# Patient Record
Sex: MALE | Race: BLACK OR AFRICAN AMERICAN | Employment: UNEMPLOYED | URBAN - METROPOLITAN AREA
[De-identification: names, ages, dates, MRNs, and addresses within clinical notes are randomized per-mention and may not be internally consistent; named-entity substitution may affect disease eponyms.]

---

## 2017-01-03 ENCOUNTER — TELEPHONE (OUTPATIENT)
Dept: FAMILY MEDICINE | Facility: CLINIC | Age: 72
End: 2017-01-03

## 2017-01-03 NOTE — TELEPHONE ENCOUNTER
Reason for Call: Request for an order or referral:    Order or referral being requested: Skilled nursing twice a month for 2 month, once a month for one month and 2 PRN    Date needed: as soon as possible    Has the patient been seen by the PCP for this problem? YES    Additional comments: Management for his diabetes    Phone number Patient can be reached at:  Other phone number:  [phone]    Best Time:  Any    Can we leave a detailed message on this number?  YES    Call taken on 1/3/2017 at 3:37 PM by YANETH LAY

## 2017-01-03 NOTE — TELEPHONE ENCOUNTER
OK given to [facility] for continuation of care with skilled nursing twice for 2 months, and once a month for one month and 2 prn.  OK given.  Latoya fax over order for signature

## 2017-01-05 DIAGNOSIS — E11.21 TYPE 2 DIABETES MELLITUS WITH DIABETIC NEPHROPATHY, WITHOUT LONG-TERM CURRENT USE OF INSULIN (H): Primary | Chronic | ICD-10-CM

## 2017-01-05 NOTE — TELEPHONE ENCOUNTER
sitagliptin (JANUVIA) 50 MG tablet         Last Written Prescription Date: 5/17/16  Last Fill Quantity: 90, # refills: 1  Last Office Visit with [facility], [facility] or [facility] prescribing provider:  10/31/16        BP Readings from Last 3 Encounters:   10/31/16 108/54   04/15/16 124/62   04/07/16 124/64     MICROL       19   10/31/2016  No results found for this basename: microalbumin  CREATININE   Date Value Ref Range Status   10/31/2016 1.04 0.66 - 1.25 mg/dL Final   ]  GFR ESTIMATE   Date Value Ref Range Status   10/31/2016 70 >60 mL/min/1.7m2 Final   03/11/2016 73 >60 mL/min/1.7m2 Final   10/13/2015 66 >60 mL/min/1.7m2 Final     GFR ESTIMATE IF BLACK   Date Value Ref Range Status   10/31/2016 85 >60 mL/min/1.7m2 Final   03/11/2016 89 >60 mL/min/1.7m2 Final   10/13/2015 80 >60 mL/min/1.7m2 Final     CHOL      161   10/31/2016  HDL       47   10/31/2016  LDL       85   10/31/2016  LDL       95   9/20/2012  TRIG      146   10/31/2016  CHOLHDLRATIO      3.1   3/11/2015  AST       25   11/2/2012  ALT       28   10/31/2016  A1C      6.7   10/31/2016  A1C      9.7   3/11/2016  A1C      7.1   10/13/2015  A1C      7.9   3/11/2015  A1C      6.3   10/3/2014  POTASSIUM   Date Value Ref Range Status   10/31/2016 4.4 3.4 - 5.3 mmol/L Final

## 2017-01-10 DIAGNOSIS — E11.21 TYPE 2 DIABETES MELLITUS WITH DIABETIC NEPHROPATHY (H): Primary | Chronic | ICD-10-CM

## 2017-01-10 NOTE — TELEPHONE ENCOUNTER
Contour strips      Last Written Prescription Date: 3/11/16  Last Fill Quantity: 100,  # refills: 4   Last Office Visit with [facility], [facility] or [facility] prescribing provider: 10/31/16      microlet lancets       Last Written Prescription Date:    Last Fill Quantity:  ,  # refills:     Last Office Visit with [facility], [facility] or [facility] prescribing provider: 10/31/16

## 2017-01-13 DIAGNOSIS — E55.9 VITAMIN D INSUFFICIENCY: Primary | ICD-10-CM

## 2017-01-13 DIAGNOSIS — E53.8 VITAMIN B 12 DEFICIENCY: ICD-10-CM

## 2017-01-13 NOTE — TELEPHONE ENCOUNTER
Vit d 50,000       Last Written Prescription Date: 3/28/16  Last Fill Quantity: 8,  # refills: 11   Last Office Visit with [facility], [facility] or [facility] prescribing provider: 10/31/16    Vit b-12 1000 mcg       Last Written Prescription Date: 3/28/16  Last Fill Quantity: 90,  # refills: 2   Last Office Visit with [facility], [facility] or [facility] prescribing provider: 10/31/16

## 2017-01-17 DIAGNOSIS — Z53.9 DIAGNOSIS NOT YET DEFINED: Primary | ICD-10-CM

## 2017-01-17 PROCEDURE — G0179 MD RECERTIFICATION HHA PT: HCPCS | Performed by: FAMILY MEDICINE

## 2017-01-17 NOTE — TELEPHONE ENCOUNTER
Prescription approved per [facility] Refill Protocol.  Vitamin B-12 Rx approved.   Will route to PCP refill for Vitamin D -50,000. Please advise if PCP would like to keep pt on same dose or order lab.  Last vitamin D  Check -10/31/2016 see below.   Vitamin D Deficiency screening 30 20 - 75 ug/L

## 2017-01-19 ENCOUNTER — TELEPHONE (OUTPATIENT)
Dept: FAMILY MEDICINE | Facility: CLINIC | Age: 72
End: 2017-01-19

## 2017-01-27 DIAGNOSIS — M17.0 PRIMARY OSTEOARTHRITIS OF BOTH KNEES: Primary | ICD-10-CM

## 2017-01-27 NOTE — TELEPHONE ENCOUNTER
mapap 500 mg       Last Written Prescription Date:     Last Fill Quantity:  ,   # refills:    Last Office Visit with [facility], [facility] or [facility] prescribing provider: 10/31/16  Future Office visit:       Routing refill request to provider for review/approval because:  Drug not on patient's medication list

## 2017-02-24 DIAGNOSIS — E55.9 VITAMIN D INSUFFICIENCY: ICD-10-CM

## 2017-02-28 ENCOUNTER — CARE COORDINATION (OUTPATIENT)
Dept: GERIATRIC MEDICINE | Facility: CLINIC | Age: 72
End: 2017-02-28

## 2017-02-28 NOTE — PROGRESS NOTES
[facility] Six-Month Telephone Assessment  6 month telephone assessment completed on 03/01/17.  ER visits: No  Hospitalizations: No  TCU stays: No  Significant health status changes: None.  Falls/Injuries: No  ADL/IADL changes: No  Changes in services: No  Caregiver Assessment follow up:  N/A  Goals: See POC in chart for goal progress documentation.   Will see client in 6 months for an annual health risk assessment.   Encouraged client to call CM with any questions or concerns in the meantime.   Digna Chen RN PHN  [facility]   Phone:   [phone]  Fax:       [phone]

## 2017-03-06 ENCOUNTER — TELEPHONE (OUTPATIENT)
Dept: FAMILY MEDICINE | Facility: CLINIC | Age: 72
End: 2017-03-06

## 2017-03-06 NOTE — TELEPHONE ENCOUNTER
RN called requesting recertification ordered for skilled nursing 2x/month for 2 months and 2 prns.   Verbal Okay given. FYI sent to PCP.

## 2017-03-10 ENCOUNTER — OFFICE VISIT (OUTPATIENT)
Dept: FAMILY MEDICINE | Facility: CLINIC | Age: 72
End: 2017-03-10
Payer: COMMERCIAL

## 2017-03-10 VITALS
HEART RATE: 73 BPM | OXYGEN SATURATION: 99 % | SYSTOLIC BLOOD PRESSURE: 114 MMHG | HEIGHT: 70 IN | WEIGHT: 245 LBS | TEMPERATURE: 97.2 F | BODY MASS INDEX: 35.07 KG/M2 | DIASTOLIC BLOOD PRESSURE: 60 MMHG | RESPIRATION RATE: 16 BRPM

## 2017-03-10 DIAGNOSIS — J20.9 ACUTE BRONCHITIS WITH SYMPTOMS > 10 DAYS: ICD-10-CM

## 2017-03-10 DIAGNOSIS — J98.01 ACUTE BRONCHOSPASM: Primary | ICD-10-CM

## 2017-03-10 DIAGNOSIS — K59.00 CONSTIPATION, UNSPECIFIED CONSTIPATION TYPE: ICD-10-CM

## 2017-03-10 PROCEDURE — 94640 AIRWAY INHALATION TREATMENT: CPT | Performed by: PHYSICIAN ASSISTANT

## 2017-03-10 PROCEDURE — 99214 OFFICE O/P EST MOD 30 MIN: CPT | Mod: 25 | Performed by: PHYSICIAN ASSISTANT

## 2017-03-10 RX ORDER — CODEINE PHOSPHATE AND GUAIFENESIN 10; 100 MG/5ML; MG/5ML
1 SOLUTION ORAL EVERY 4 HOURS PRN
Qty: 120 ML | Refills: 0 | Status: SHIPPED | OUTPATIENT
Start: 2017-03-10 | End: 2017-05-08

## 2017-03-10 RX ORDER — POLYETHYLENE GLYCOL 3350 17 G/17G
1 POWDER, FOR SOLUTION ORAL DAILY
Qty: 510 G | Refills: 0 | Status: SHIPPED | OUTPATIENT
Start: 2017-03-10 | End: 2017-05-08

## 2017-03-10 RX ORDER — ALBUTEROL SULFATE 90 UG/1
2 AEROSOL, METERED RESPIRATORY (INHALATION) EVERY 6 HOURS PRN
Qty: 1 INHALER | Refills: 0 | Status: SHIPPED | OUTPATIENT
Start: 2017-03-10 | End: 2017-05-08

## 2017-03-10 RX ORDER — AZITHROMYCIN 250 MG/1
TABLET, FILM COATED ORAL
Qty: 6 TABLET | Refills: 0 | Status: SHIPPED | OUTPATIENT
Start: 2017-03-10 | End: 2017-05-08

## 2017-03-10 NOTE — PROGRESS NOTES
SUBJECTIVE:                                                    Suze Devlin is a 72 year old male who presents to clinic today for the following health issues:      RESPIRATORY SYMPTOMS      Duration: 2 weeks    Description  nasal congestion, cough    Severity: moderate    Accompanying signs and symptoms: None    History (predisposing factors):  none    Precipitating or alleviating factors: None    Therapies tried and outcome:  none   Denies fever/chills, HA, CP, abd pain, N/V/D, rash, or any other symptoms. Patient denies history of DVT/PE, recent travel/surgery, tobacco use, leg pain or swelling, or history of cancer.    ## He is also complaining of constipation. He has been using senna, which he states is not improving or helping his constipation.     Problem list and histories reviewed & adjusted, as indicated.  Additional history: as documented    Patient Active Problem List   Diagnosis     Health Care Home     Prostatitis     Disease of lung     Cataract     Elevated prostate specific antigen (PSA)     B-complex deficiency     Insomnia     Vitamin D deficiency     Hypertrophy of prostate without urinary obstruction     Memory loss     Anxiety state     Esophageal reflux     Primary localized osteoarthrosis, lower leg     Vitamin D insufficiency     Constipation     Vitamin B 12 deficiency     Hypertension goal BP (blood pressure) < 140/80     Chronic fatigue     Hyperlipidemia LDL goal <100     GERD (gastroesophageal reflux disease)     Asthma, mild intermittent     Diabetic neuropathy (H)     Lumbago     ACP (advance care planning)     Type 2 diabetes mellitus with diabetic nephropathy (H)     Past Surgical History   Procedure Laterality Date     Hernia repair, inguinal rt/lt         Social History   Substance Use Topics     Smoking status: Never Smoker     Smokeless tobacco: Never Used     Alcohol use No     Family History   Problem Relation Age of Onset     Unknown/Adopted Mother      medical  history of family is unknown.         Current Outpatient Prescriptions   Medication Sig Dispense Refill     albuterol (PROAIR HFA/PROVENTIL HFA/VENTOLIN HFA) 108 (90 BASE) MCG/ACT Inhaler Inhale 2 puffs into the lungs every 6 hours as needed for shortness of breath / dyspnea or wheezing 1 Inhaler 0     azithromycin (ZITHROMAX) 250 MG tablet Two tablets first day, then one tablet daily for four days. 6 tablet 0     guaiFENesin-codeine (ROBITUSSIN AC) 100-10 MG/5ML SOLN solution Take 5 mLs by mouth every 4 hours as needed for cough 120 mL 0     polyethylene glycol (MIRALAX) powder Take 17 g (1 capful) by mouth daily 510 g 0     cholecalciferol (VITAMIN D3) 74692 UNITS capsule Take 1 capsule (50,000 Units) by mouth once a week 4 capsule 11     acetaminophen 500 MG CAPS Take 1 tablet by mouth 4 times daily 120 capsule 11     Cyanocobalamin (B-12) 1000 MCG TBCR Take 1,000 mcg by mouth daily 90 tablet 2     blood glucose monitoring (NO BRAND SPECIFIED) test strip Use to test blood sugars 4 times daily or as directed    Contour 400 strip 1     order for DME Lancets.  Four times daily and prn. Fill per patient's insurance.      Microlet lancets 400 each 1     sitagliptin (JANUVIA) 50 MG tablet Take 1 tablet (50 mg) by mouth daily 90 tablet 1     pioglitazone (ACTOS) 15 MG tablet Take 1 tablet (15 mg) by mouth daily 90 tablet 1     glipiZIDE (GLUCOTROL XL) 10 MG 24 hr tablet Take 1 tablet (10 mg) by mouth daily 90 tablet 1     montelukast (SINGULAIR) 10 MG tablet Take 1 tablet (10 mg) by mouth At Bedtime 90 tablet 3     omeprazole (PRILOSEC) 40 MG capsule Take 1 capsule (40 mg) by mouth daily Take 30-60 minutes before a meal. 91 capsule 2     finasteride (PROSCAR) 5 MG tablet Take 1 tablet (5 mg) by mouth daily 90 tablet 3     atorvastatin (LIPITOR) 20 MG tablet Take 1 tablet (20 mg) by mouth daily 90 tablet 3     tamsulosin (FLOMAX) 0.4 MG 24 hr capsule Take 1 capsule (0.4 mg) by mouth daily 90 capsule 3     order for DME  Equipment being ordered: Nebulizer  ONE  USE Q 4 HOURS PRN EXACERBATION OF ASTHMA ML  ALBUTEROL 2.5MG PER 3ML   (J45.20) Mild intermittent asthma without complication  (primary encounter diagnosis)    MAKI MORA JR., MD 1 each 1     metFORMIN modified (GLUMETZA) 1000 MG 24 hr tablet Take 1 tablet (1,000 mg) by mouth daily (with dinner) 30 tablet 11     senna-docusate (SENOKOT-S;PERICOLACE) 8.6-50 MG per tablet Take 1 tablet by mouth 2 times daily 120 tablet 11     albuterol (PROAIR HFA, PROVENTIL HFA, VENTOLIN HFA) 108 (90 BASE) MCG/ACT inhaler Inhale 2 puffs into the lungs 4 times daily as needed 1 Inhaler 11     ipratropium - albuterol 0.5 mg/2.5 mg/3 mL (DUONEB) 0.5-2.5 (3) MG/3ML nebulization Take 1 vial (3 mLs) by nebulization every 6 hours as needed for shortness of breath / dyspnea or wheezing 2.5 mL o     losartan (COZAAR) 25 MG tablet Take 1 tablet (25 mg) by mouth daily 90 tablet 3     aspirin 81 MG EC tablet Take 1 tablet (81 mg) by mouth daily 90 tablet 3     blood glucose monitoring (NO BRAND SPECIFIED) meter device kit Use to test blood sugar two times daily or as directed. 1 kit 0     blood glucose monitoring (NO BRAND SPECIFIED) test strip Use to test blood sugars two times daily or as directed 100 each 4     diclofenac (VOLTAREN) 1 % GEL Apply 4 grams to knees or 2 grams to hands four times daily using enclosed dosing card. 100 g 1     traMADol (ULTRAM) 50 MG tablet Take 1 tablet (50 mg) by mouth 2 times daily as needed for moderate pain 240 tablet 0     blood glucose monitoring (ONE TOUCH DELICA) lancets Use to test blood sugars  TWICE DAILY    or as directed. 3 Box prn     lidocaine (XYLOCAINE) 5 % ointment One application 4 x daily to affected areas lower back and knees if necessary 300 g 0     tadalafil (CIALIS) 5 MG tablet Take 1 tablet (5 mg) by mouth daily Never use with nitroglycerin, terazosin or doxazosin. 90 tablet 0     acetaminophen (TYLENOL) 650 MG CR tablet Take 1 tablet (650  "mg) by mouth 4 times daily 240 tablet 0     triamcinolone (KENALOG) 0.1 % paste Take by mouth 2 times daily 5 g 11     blood glucose test strip 1 strip by In Vitro route daily 100 strip 3     No Known Allergies    Reviewed and updated as needed this visit by clinical staff  Tobacco       Reviewed and updated as needed this visit by Provider         ROS:  Constitutional, HEENT, cardiovascular, pulmonary, GI, , musculoskeletal, neuro, skin, endocrine and psych systems are negative, except as otherwise noted.    OBJECTIVE:                                                    /60  Pulse 73  Temp 97.2  F (36.2  C) (Tympanic)  Resp 16  Ht 5' 10\" (1.778 m)  Wt 245 lb (111.1 kg)  SpO2 99%  BMI 35.15 kg/m2  Body mass index is 35.15 kg/(m^2).  GENERAL: healthy, alert and no distress  EYES: Eyes grossly normal to inspection, PERRL and conjunctivae and sclerae normal  HENT: ear canals and TM's normal, nose and mouth without ulcers or lesions  NECK: no adenopathy, no asymmetry, masses, or scars and thyroid normal to palpation  RESP: expiratory wheezes R upper anterior, R upper posterior, L upper anterior and L upper posterior  CV: regular rate and rhythm, normal S1 S2, no S3 or S4, no murmur, click or rub, no peripheral edema and peripheral pulses strong  ABDOMEN: soft, nontender, no hepatosplenomegaly, no masses and bowel sounds normal  MS: no gross musculoskeletal defects noted, no edema    Diagnostic Test Results:  none    DUO NEB given in office and lungs CTA after treatment.   ASSESSMENT/PLAN:                                                    1. Acute bronchospasm  - ALBUTEROL/IPRATROPIUM 3ML NEB  - INHALATION/NEBULIZER TREATMENT, INITIAL  - albuterol (PROAIR HFA/PROVENTIL HFA/VENTOLIN HFA) 108 (90 BASE) MCG/ACT Inhaler; Inhale 2 puffs into the lungs every 6 hours as needed for shortness of breath / dyspnea or wheezing  Dispense: 1 Inhaler; Refill: 0    2. Acute bronchitis with symptoms > 10 days  Lungs are CTA " after 1 DUONEB. Discussion with patient about blood sugars, which he states are controlled. Oral steroid can raise BS. Patient declines medication at this time. If symptoms persist with wheezing for another 10 + days I advise that he RTC and a course of oral steroids be initiated.   - albuterol (PROAIR HFA/PROVENTIL HFA/VENTOLIN HFA) 108 (90 BASE) MCG/ACT Inhaler; Inhale 2 puffs into the lungs every 6 hours as needed for shortness of breath / dyspnea or wheezing  Dispense: 1 Inhaler; Refill: 0  - azithromycin (ZITHROMAX) 250 MG tablet; Two tablets first day, then one tablet daily for four days.  Dispense: 6 tablet; Refill: 0  - guaiFENesin-codeine (ROBITUSSIN AC) 100-10 MG/5ML SOLN solution; Take 5 mLs by mouth every 4 hours as needed for cough  Dispense: 120 mL; Refill: 0    3. Constipation, unspecified constipation type  Diet and adequate hydration.   - polyethylene glycol (MIRALAX) powder; Take 17 g (1 capful) by mouth daily  Dispense: 510 g; Refill: 0  I have discussed any lab or imaging results, the patient's diagnosis, and my plan of treatment with the patient and/or family. Patient is aware to come back in with worsening symptoms or if no relief despite treatment plan.  Patient voiced understanding and had no further questions.     Nicki Schumacher PA-C  [facility]

## 2017-03-10 NOTE — MR AVS SNAPSHOT
"              After Visit Summary   3/10/2017    Suze Devlin    MRN: [id number]           Patient Information     Date Of Birth          1945        Visit Information        Provider Department      3/10/2017 10:20 AM Nicki Schumacher PA-C; [employer] [facility]        Today's Diagnoses     Acute bronchospasm    -  1    Acute bronchitis with symptoms > 10 days        Constipation, unspecified constipation type           Follow-ups after your visit        Who to contact     If you have questions or need follow up information about today's clinic visit or your schedule please contact [facility] directly at [phone].  Normal or non-critical lab and imaging results will be communicated to you by MyChart, letter or phone within 4 business days after the clinic has received the results. If you do not hear from us within 7 days, please contact the clinic through MyChart or phone. If you have a critical or abnormal lab result, we will notify you by phone as soon as possible.  Submit refill requests through [employer] or call your pharmacy and they will forward the refill request to us. Please allow 3 business days for your refill to be completed.          Additional Information About Your Visit        MyChart Information     [employer] lets you send messages to your doctor, view your test results, renew your prescriptions, schedule appointments and more. To sign up, go to [email or web address] . Click on \"Log in\" on the left side of the screen, which will take you to the Welcome page. Then click on \"Sign up Now\" on the right side of the page.     You will be asked to enter the access code listed below, as well as some personal information. Please follow the directions to create your username and password.     Your access code is: [email or web address]  Expires: 2017 11:10 AM     Your access code will  in 90 days. If " "you need help or a new code, please call your [place] clinic or [phone].        Care EveryWhere ID     This is your Care EveryWhere ID. This could be used by other organizations to access your [place] medical records  [id number]        Your Vitals Were     Pulse Temperature Respirations Height Pulse Oximetry BMI (Body Mass Index)    73 97.2  F (36.2  C) (Tympanic) 16 5' 10\" (1.778 m) 99% 35.15 kg/m2       Blood Pressure from Last 3 Encounters:   03/10/17 114/60   10/31/16 108/54   04/15/16 124/62    Weight from Last 3 Encounters:   03/10/17 245 lb (111.1 kg)   10/31/16 237 lb 6.4 oz (107.7 kg)   04/15/16 221 lb 3.2 oz (100.3 kg)              We Performed the Following     ALBUTEROL/IPRATROPIUM 3ML NEB     INHALATION/NEBULIZER TREATMENT, INITIAL          Today's Medication Changes          These changes are accurate as of: 3/10/17 11:10 AM.  If you have any questions, ask your nurse or doctor.               Start taking these medicines.        Dose/Directions    azithromycin 250 MG tablet   Commonly known as:  ZITHROMAX   Used for:  Acute bronchitis with symptoms > 10 days   Started by:  Nicki Schumacher PA-C        Two tablets first day, then one tablet daily for four days.   Quantity:  6 tablet   Refills:  0       guaiFENesin-codeine 100-10 MG/5ML Soln solution   Commonly known as:  ROBITUSSIN AC   Used for:  Acute bronchitis with symptoms > 10 days   Started by:  Nicki Schumacher PA-C        Dose:  1 tsp.   Take 5 mLs by mouth every 4 hours as needed for cough   Quantity:  120 mL   Refills:  0       polyethylene glycol powder   Commonly known as:  MIRALAX   Used for:  Constipation, unspecified constipation type   Started by:  Nicki Schumacher PA-C        Dose:  1 capful   Take 17 g (1 capful) by mouth daily   Quantity:  510 g   Refills:  0         These medicines have changed or have updated prescriptions.        Dose/Directions    * albuterol 108 (90 BASE) MCG/ACT Inhaler   Commonly " known as:  PROAIR HFA/PROVENTIL HFA/VENTOLIN HFA   This may have changed:  Another medication with the same name was added. Make sure you understand how and when to take each.   Used for:  Wheezing, Mild intermittent asthma with acute exacerbation   Changed by:  Zack Hudson MD        Dose:  2 puff   Inhale 2 puffs into the lungs 4 times daily as needed   Quantity:  1 Inhaler   Refills:  11       * albuterol 108 (90 BASE) MCG/ACT Inhaler   Commonly known as:  PROAIR HFA/PROVENTIL HFA/VENTOLIN HFA   This may have changed:  You were already taking a medication with the same name, and this prescription was added. Make sure you understand how and when to take each.   Used for:  Acute bronchospasm, Acute bronchitis with symptoms > 10 days   Changed by:  Nicki Schumacher PA-C        Dose:  2 puff   Inhale 2 puffs into the lungs every 6 hours as needed for shortness of breath / dyspnea or wheezing   Quantity:  1 Inhaler   Refills:  0       * Notice:  This list has 2 medication(s) that are the same as other medications prescribed for you. Read the directions carefully, and ask your doctor or other care provider to review them with you.         Where to get your medicines      These medications were sent to [facility] [facility], [address] [address]     Phone:  [phone]     albuterol 108 (90 BASE) MCG/ACT Inhaler    azithromycin 250 MG tablet    polyethylene glycol powder         Some of these will need a paper prescription and others can be bought over the counter.  Ask your nurse if you have questions.     Bring a paper prescription for each of these medications     guaiFENesin-codeine 100-10 MG/5ML Soln solution                Primary Care Provider Office Phone # Fax #    Zack Hudson [phone]       [facility] RASHID HAAS [address] [facility] [address]        Thank you!     Thank you for  choosing [facility]  for your care. Our goal is always to provide you with excellent care. Hearing back from our patients is one way we can continue to improve our services. Please take a few minutes to complete the written survey that you may receive in the mail after your visit with us. Thank you!             Your Updated Medication List - Protect others around you: Learn how to safely use, store and throw away your medicines at www.disposemymeds.org.          This list is accurate as of: 3/10/17 11:10 AM.  Always use your most recent med list.                   Brand Name Dispense Instructions for use    * acetaminophen 650 MG CR tablet    TYLENOL    240 tablet    Take 1 tablet (650 mg) by mouth 4 times daily       * acetaminophen 500 MG Caps     120 capsule    Take 1 tablet by mouth 4 times daily       * albuterol 108 (90 BASE) MCG/ACT Inhaler    PROAIR HFA/PROVENTIL HFA/VENTOLIN HFA    1 Inhaler    Inhale 2 puffs into the lungs 4 times daily as needed       * albuterol 108 (90 BASE) MCG/ACT Inhaler    PROAIR HFA/PROVENTIL HFA/VENTOLIN HFA    1 Inhaler    Inhale 2 puffs into the lungs every 6 hours as needed for shortness of breath / dyspnea or wheezing       aspirin 81 MG EC tablet     90 tablet    Take 1 tablet (81 mg) by mouth daily       atorvastatin 20 MG tablet    LIPITOR    90 tablet    Take 1 tablet (20 mg) by mouth daily       azithromycin 250 MG tablet    ZITHROMAX    6 tablet    Two tablets first day, then one tablet daily for four days.       B-12 1000 MCG Tbcr     90 tablet    Take 1,000 mcg by mouth daily       blood glucose monitoring lancets     3 Box    Use to test blood sugars  TWICE DAILY    or as directed.       blood glucose monitoring meter device kit    no brand specified    1 kit    Use to test blood sugar two times daily or as directed.       * blood glucose monitoring test strip    no brand specified    100 strip    1 strip by In Vitro route daily        * blood glucose monitoring test strip    no brand specified    100 each    Use to test blood sugars two times daily or as directed       * blood glucose monitoring test strip    no brand specified    400 strip    Use to test blood sugars 4 times daily or as directed  Contour       cholecalciferol 44762 UNITS capsule    VITAMIN D3    4 capsule    Take 1 capsule (50,000 Units) by mouth once a week       diclofenac 1 % Gel topical gel    VOLTAREN    100 g    Apply 4 grams to knees or 2 grams to hands four times daily using enclosed dosing card.       finasteride 5 MG tablet    PROSCAR    90 tablet    Take 1 tablet (5 mg) by mouth daily       glipiZIDE 10 MG 24 hr tablet    GLUCOTROL XL    90 tablet    Take 1 tablet (10 mg) by mouth daily       guaiFENesin-codeine 100-10 MG/5ML Soln solution    ROBITUSSIN AC    120 mL    Take 5 mLs by mouth every 4 hours as needed for cough       ipratropium - albuterol 0.5 mg/2.5 mg/3 mL 0.5-2.5 (3) MG/3ML neb solution    DUONEB    2.5 mL    Take 1 vial (3 mLs) by nebulization every 6 hours as needed for shortness of breath / dyspnea or wheezing       lidocaine 5 % ointment    XYLOCAINE    300 g    One application 4 x daily to affected areas lower back and knees if necessary       losartan 25 MG tablet    COZAAR    90 tablet    Take 1 tablet (25 mg) by mouth daily       metFORMIN modified 1000 MG 24 hr tablet    GLUMETZA    30 tablet    Take 1 tablet (1,000 mg) by mouth daily (with dinner)       montelukast 10 MG tablet    SINGULAIR    90 tablet    Take 1 tablet (10 mg) by mouth At Bedtime       omeprazole 40 MG capsule    priLOSEC    91 capsule    Take 1 capsule (40 mg) by mouth daily Take 30-60 minutes before a meal.       * order for DME     1 each    Equipment being ordered: Nebulizer ONE USE Q 4 HOURS PRN EXACERBATION OF ASTHMA ML ALBUTEROL 2.5MG PER 3ML  (J45.20) Mild intermittent asthma without complication  (primary encounter diagnosis)  MAKI MORA JR., MD       *  order for DME     400 each    Lancets.  Four times daily and prn. Fill per patient's insurance.   Microlet lancets       pioglitazone 15 MG tablet    ACTOS    90 tablet    Take 1 tablet (15 mg) by mouth daily       polyethylene glycol powder    MIRALAX    510 g    Take 17 g (1 capful) by mouth daily       senna-docusate 8.6-50 MG per tablet    SENOKOT-S;PERICOLACE    120 tablet    Take 1 tablet by mouth 2 times daily       sitagliptin 50 MG tablet    JANUVIA    90 tablet    Take 1 tablet (50 mg) by mouth daily       tadalafil 5 MG tablet    CIALIS    90 tablet    Take 1 tablet (5 mg) by mouth daily Never use with nitroglycerin, terazosin or doxazosin.       tamsulosin 0.4 MG capsule    FLOMAX    90 capsule    Take 1 capsule (0.4 mg) by mouth daily       traMADol 50 MG tablet    ULTRAM    240 tablet    Take 1 tablet (50 mg) by mouth 2 times daily as needed for moderate pain       triamcinolone 0.1 % paste    KENALOG    5 g    Take by mouth 2 times daily       * Notice:  This list has 9 medication(s) that are the same as other medications prescribed for you. Read the directions carefully, and ask your doctor or other care provider to review them with you.

## 2017-03-10 NOTE — NURSING NOTE
"No chief complaint on file.      Initial /60  Pulse 73  Temp 97.2  F (36.2  C) (Tympanic)  Resp 16  Ht 5' 10\" (1.778 m)  Wt 245 lb (111.1 kg)  SpO2 99%  BMI 35.15 kg/m2 Estimated body mass index is 35.15 kg/(m^2) as calculated from the following:    Height as of this encounter: 5' 10\" (1.778 m).    Weight as of this encounter: 245 lb (111.1 kg).  Medication Reconciliation: complete   .Lazara KOEHLER      "

## 2017-03-23 DIAGNOSIS — Z53.9 DIAGNOSIS NOT YET DEFINED: Primary | ICD-10-CM

## 2017-03-23 PROCEDURE — G0179 MD RECERTIFICATION HHA PT: HCPCS | Performed by: FAMILY MEDICINE

## 2017-04-03 DIAGNOSIS — E78.5 HYPERLIPIDEMIA LDL GOAL <100: Chronic | ICD-10-CM

## 2017-04-03 NOTE — TELEPHONE ENCOUNTER
aspirin 81 MG EC tablet      Last Written Prescription Date: 3/28/16  Last Fill Quantity: 90,  # refills: 3   Last Office Visit with [facility], [facility] or [facility] prescribing provider: 3/10/17

## 2017-04-14 DIAGNOSIS — I10 HYPERTENSION GOAL BP (BLOOD PRESSURE) < 140/80: Chronic | ICD-10-CM

## 2017-04-14 RX ORDER — LOSARTAN POTASSIUM 25 MG/1
25 TABLET ORAL DAILY
Qty: 90 TABLET | Refills: 1 | Status: SHIPPED | OUTPATIENT
Start: 2017-04-14 | End: 2017-10-04

## 2017-04-14 NOTE — TELEPHONE ENCOUNTER
losartan (COZAAR) 25 MG tablet      Last Written Prescription Date: 3/29/16  Last Fill Quantity: 90, # refills: 3  Last Office Visit with G, [facility] or [facility] prescribing provider: 3/10/17       Potassium   Date Value Ref Range Status   10/31/2016 4.4 3.4 - 5.3 mmol/L Final     Creatinine   Date Value Ref Range Status   10/31/2016 1.04 0.66 - 1.25 mg/dL Final     BP Readings from Last 3 Encounters:   03/10/17 114/60   10/31/16 108/54   04/15/16 124/62

## 2017-04-18 DIAGNOSIS — R14.2 FLATULENCE, ERUCTATION, AND GAS PAIN: ICD-10-CM

## 2017-04-18 DIAGNOSIS — R14.1 FLATULENCE, ERUCTATION, AND GAS PAIN: ICD-10-CM

## 2017-04-18 DIAGNOSIS — E78.5 HYPERLIPIDEMIA LDL GOAL <100: Chronic | ICD-10-CM

## 2017-04-18 DIAGNOSIS — R14.3 FLATULENCE, ERUCTATION, AND GAS PAIN: ICD-10-CM

## 2017-04-18 RX ORDER — ATORVASTATIN CALCIUM 20 MG/1
20 TABLET, FILM COATED ORAL DAILY
Qty: 90 TABLET | Refills: 1 | Status: SHIPPED | OUTPATIENT
Start: 2017-04-18 | End: 2017-10-04

## 2017-04-18 NOTE — TELEPHONE ENCOUNTER
Prescription approved per [facility], [facility] or [facility] refill protocol.  Mara Walker RN  [facility] [employer]

## 2017-04-18 NOTE — TELEPHONE ENCOUNTER
atorvastatin (LIPITOR) 20 MG tablet     Last Written Prescription Date: 5/11/16  Last Fill Quantity: 90, # refills: 3  Last Office Visit with G, P or [facility] prescribing provider: 3/10/17       Lab Results   Component Value Date    CHOL 161 10/31/2016     Lab Results   Component Value Date    HDL 47 10/31/2016     Lab Results   Component Value Date    LDL 85 10/31/2016     Lab Results   Component Value Date    TRIG 146 10/31/2016     Lab Results   Component Value Date    CHOLHDLRATIO 3.1 03/11/2015

## 2017-05-08 ENCOUNTER — OFFICE VISIT (OUTPATIENT)
Dept: FAMILY MEDICINE | Facility: CLINIC | Age: 72
End: 2017-05-08
Payer: COMMERCIAL

## 2017-05-08 VITALS
HEIGHT: 70 IN | HEART RATE: 90 BPM | DIASTOLIC BLOOD PRESSURE: 56 MMHG | TEMPERATURE: 97.5 F | WEIGHT: 244 LBS | BODY MASS INDEX: 34.93 KG/M2 | OXYGEN SATURATION: 100 % | RESPIRATION RATE: 20 BRPM | SYSTOLIC BLOOD PRESSURE: 116 MMHG

## 2017-05-08 DIAGNOSIS — K59.00 CONSTIPATION, UNSPECIFIED CONSTIPATION TYPE: ICD-10-CM

## 2017-05-08 DIAGNOSIS — J45.20 MILD INTERMITTENT ASTHMA WITHOUT COMPLICATION: Chronic | ICD-10-CM

## 2017-05-08 DIAGNOSIS — R06.2 WHEEZING: ICD-10-CM

## 2017-05-08 DIAGNOSIS — B02.9 VARICELLA-ZOSTER VIRUS INFECTION: Primary | ICD-10-CM

## 2017-05-08 DIAGNOSIS — J22 LRTI (LOWER RESPIRATORY TRACT INFECTION): ICD-10-CM

## 2017-05-08 DIAGNOSIS — E11.21 TYPE 2 DIABETES MELLITUS WITH DIABETIC NEPHROPATHY, WITHOUT LONG-TERM CURRENT USE OF INSULIN (H): Chronic | ICD-10-CM

## 2017-05-08 DIAGNOSIS — J45.21 MILD INTERMITTENT ASTHMA WITH ACUTE EXACERBATION: Chronic | ICD-10-CM

## 2017-05-08 PROCEDURE — 99214 OFFICE O/P EST MOD 30 MIN: CPT | Performed by: INTERNAL MEDICINE

## 2017-05-08 RX ORDER — VALACYCLOVIR HYDROCHLORIDE 1 G/1
1000 TABLET, FILM COATED ORAL 3 TIMES DAILY
Qty: 21 TABLET | Refills: 0 | Status: SHIPPED | OUTPATIENT
Start: 2017-05-08 | End: 2018-09-12

## 2017-05-08 RX ORDER — GABAPENTIN 300 MG/1
CAPSULE ORAL
Qty: 90 CAPSULE | Refills: 3 | Status: SHIPPED | OUTPATIENT
Start: 2017-05-08 | End: 2017-11-03

## 2017-05-08 RX ORDER — ALBUTEROL SULFATE 90 UG/1
2 AEROSOL, METERED RESPIRATORY (INHALATION) 4 TIMES DAILY PRN
Qty: 1 INHALER | Refills: 11 | Status: SHIPPED | OUTPATIENT
Start: 2017-05-08 | End: 2017-05-15

## 2017-05-08 RX ORDER — POLYETHYLENE GLYCOL 3350 17 G/17G
1 POWDER, FOR SOLUTION ORAL DAILY
Qty: 510 G | Refills: 3 | Status: SHIPPED | OUTPATIENT
Start: 2017-05-08 | End: 2017-05-22

## 2017-05-08 RX ORDER — PREDNISONE 20 MG/1
20 TABLET ORAL 2 TIMES DAILY
Qty: 10 TABLET | Refills: 0 | Status: SHIPPED | OUTPATIENT
Start: 2017-05-08 | End: 2017-05-22

## 2017-05-08 NOTE — PROGRESS NOTES
SUBJECTIVE:                                                    Suze Devlin is a 72 year old male who presents to clinic today for the following health issues:      Acute Illness   Acute illness concerns: cough  Onset: 3 days    Fever: no     Chills/Sweats: no     Headache (location?): no     Sinus Pressure:no    Conjunctivitis:  no    Ear Pain: no    Rhinorrhea: YES    Congestion: YES    Sore Throat: YES     Cough: YES    Wheeze: YES- occ.    Decreased Appetite: no     Nausea: no     Vomiting: no     Diarrhea:  no     Dysuria/Freq.: no     Fatigue/Achiness: YES    Sick/Strep Exposure: no      Therapies Tried and outcome: nothing    Joint Pain     Onset: 1 week    Description:   Location: right Hip  Character: Dull ache, Gnawing and Cramping    Intensity: moderate    Progression of Symptoms: same    Accompanying Signs & Symptoms:  Other symptoms: radiation of pain to down right thigh to right knee   History:   Previous similar pain: no       Precipitating factors:   Trauma or overuse: no     Alleviating factors:  Improved by: nothing       Therapies Tried and outcome: Tylenol prn-only somewhat effective               This patient is here with an .   For 1 week or so, he has developed pain in the right upper leg anterolaterally primarily.                   He is describing a dysesthesia, such that even light touch is uncomfortable.       He is having pain at night as well.            There is no impairment of ambulation, and no injury.                                     He also has some respiratory symptoms, to include some wheezing.          No fever chills or mucus production. He reports a history of asthma, but no longer has an albuterol inhaler.                                                        he also wants a refill of MiraLAX. This has worked very well for him.            Problem list and histories reviewed & adjusted, as indicated.      Current Outpatient Prescriptions   Medication  Sig Dispense Refill     atorvastatin (LIPITOR) 20 MG tablet Take 1 tablet (20 mg) by mouth daily 90 tablet 1     losartan (COZAAR) 25 MG tablet Take 1 tablet (25 mg) by mouth daily 90 tablet 1     aspirin 81 MG EC tablet Take 1 tablet (81 mg) by mouth daily 90 tablet 3     polyethylene glycol (MIRALAX) powder Take 17 g (1 capful) by mouth daily 510 g 0     cholecalciferol (VITAMIN D3) 38490 UNITS capsule Take 1 capsule (50,000 Units) by mouth once a week 4 capsule 11     acetaminophen 500 MG CAPS Take 1 tablet by mouth 4 times daily 120 capsule 11     Cyanocobalamin (B-12) 1000 MCG TBCR Take 1,000 mcg by mouth daily 90 tablet 2     blood glucose monitoring (NO BRAND SPECIFIED) test strip Use to test blood sugars 4 times daily or as directed    Contour 400 strip 1     order for DME Lancets.  Four times daily and prn. Fill per patient's insurance.      Microlet lancets 400 each 1     sitagliptin (JANUVIA) 50 MG tablet Take 1 tablet (50 mg) by mouth daily 90 tablet 1     pioglitazone (ACTOS) 15 MG tablet Take 1 tablet (15 mg) by mouth daily 90 tablet 1     glipiZIDE (GLUCOTROL XL) 10 MG 24 hr tablet Take 1 tablet (10 mg) by mouth daily 90 tablet 1     montelukast (SINGULAIR) 10 MG tablet Take 1 tablet (10 mg) by mouth At Bedtime 90 tablet 3     omeprazole (PRILOSEC) 40 MG capsule Take 1 capsule (40 mg) by mouth daily Take 30-60 minutes before a meal. 91 capsule 2     finasteride (PROSCAR) 5 MG tablet Take 1 tablet (5 mg) by mouth daily 90 tablet 3     tamsulosin (FLOMAX) 0.4 MG 24 hr capsule Take 1 capsule (0.4 mg) by mouth daily 90 capsule 3     order for DME Equipment being ordered: Nebulizer  ONE  USE Q 4 HOURS PRN EXACERBATION OF ASTHMA ML  ALBUTEROL 2.5MG PER 3ML   (J45.20) Mild intermittent asthma without complication  (primary encounter diagnosis)    MAKI MORA JR., MD 1 each 1     metFORMIN modified (GLUMETZA) 1000 MG 24 hr tablet Take 1 tablet (1,000 mg) by mouth daily (with dinner) 30 tablet 11      senna-docusate (SENOKOT-S;PERICOLACE) 8.6-50 MG per tablet Take 1 tablet by mouth 2 times daily 120 tablet 11     albuterol (PROAIR HFA, PROVENTIL HFA, VENTOLIN HFA) 108 (90 BASE) MCG/ACT inhaler Inhale 2 puffs into the lungs 4 times daily as needed 1 Inhaler 11     ipratropium - albuterol 0.5 mg/2.5 mg/3 mL (DUONEB) 0.5-2.5 (3) MG/3ML nebulization Take 1 vial (3 mLs) by nebulization every 6 hours as needed for shortness of breath / dyspnea or wheezing 2.5 mL o     blood glucose monitoring (NO BRAND SPECIFIED) meter device kit Use to test blood sugar two times daily or as directed. 1 kit 0     blood glucose monitoring (NO BRAND SPECIFIED) test strip Use to test blood sugars two times daily or as directed 100 each 4     diclofenac (VOLTAREN) 1 % GEL Apply 4 grams to knees or 2 grams to hands four times daily using enclosed dosing card. 100 g 1     traMADol (ULTRAM) 50 MG tablet Take 1 tablet (50 mg) by mouth 2 times daily as needed for moderate pain 240 tablet 0     blood glucose monitoring (ONE TOUCH DELICA) lancets Use to test blood sugars  TWICE DAILY    or as directed. 3 Box prn     lidocaine (XYLOCAINE) 5 % ointment One application 4 x daily to affected areas lower back and knees if necessary 300 g 0     tadalafil (CIALIS) 5 MG tablet Take 1 tablet (5 mg) by mouth daily Never use with nitroglycerin, terazosin or doxazosin. 90 tablet 0     acetaminophen (TYLENOL) 650 MG CR tablet Take 1 tablet (650 mg) by mouth 4 times daily 240 tablet 0     triamcinolone (KENALOG) 0.1 % paste Take by mouth 2 times daily 5 g 11     blood glucose test strip 1 strip by In Vitro route daily 100 strip 3             No Known Allergies  BP Readings from Last 3 Encounters:   05/08/17 116/56   03/10/17 114/60   10/31/16 108/54    Wt Readings from Last 3 Encounters:   05/08/17 244 lb (110.7 kg)   03/10/17 245 lb (111.1 kg)   10/31/16 237 lb 6.4 oz (107.7 kg)                    Reviewed and updated as needed this visit by clinical  "staff  Tobacco  Allergies  Meds  Med Hx  Surg Hx  Fam Hx  Soc Hx      Reviewed and updated as needed this visit by Provider         ROS:  CONSTITUTIONAL:NEGATIVE for fever, chills, change in weight  RESP:POSITIVE for cough-non productive and Hx asthma  MUSCULOSKELETAL: NEGATIVE for back pain   NEURO: POSITIVE for numbness or tingling right leg     OBJECTIVE:                                                    /56 (BP Location: Left arm, Patient Position: Chair, Cuff Size: Adult Large)  Pulse 90  Temp 97.5  F (36.4  C)  Resp 20  Ht 5' 10\" (1.778 m)  Wt 244 lb (110.7 kg)  SpO2 100%  BMI 35.01 kg/m2  Body mass index is 35.01 kg/(m^2).  GENERAL APPEARANCE: alert and over weight  RESP: no rales or rhonchi  CV: regular rates and rhythm and normal S1 S2, no S3 or S4  SKIN: There are some areas of a faint pink papular rash the right upper lateral leg.            I don't see any vesicles at this time.     Diagnostic test results:  none      ASSESSMENT/PLAN:                                                        ICD-10-CM    1. Varicella-zoster virus infection B02.9 valACYclovir (VALTREX) 1000 mg tablet    B01.9 gabapentin (NEURONTIN) 300 MG capsule    right leg   2. Constipation, unspecified constipation type K59.00 polyethylene glycol (MIRALAX) powder   3. LRTI (lower respiratory tract infection) J22    4. Mild intermittent asthma without complication J45.20 predniSONE (DELTASONE) 20 MG tablet   5. Type 2 diabetes mellitus with diabetic nephropathy, without long-term current use of insulin (H) E11.21    6. Wheezing R06.2 albuterol (PROAIR HFA/PROVENTIL HFA/VENTOLIN HFA) 108 (90 BASE) MCG/ACT Inhaler   7. Mild intermittent asthma with acute exacerbation J45.21 albuterol (PROAIR HFA/PROVENTIL HFA/VENTOLIN HFA) 108 (90 BASE) MCG/ACT Inhaler       I reviewed the situation at length with the patient through the .                   I believe he very likely has early shingles.                   Patient " Instructions   I believe you have shingles involving the right leg.                        I have ordered an antiviral medication, and gabapentin to lessen the nerve pain.   This can result in drowsiness as a side effect.                                        You also have a mild flare up of asthma.               I have ordered prednisone for 5 days.                                     You should also use an albuterol inhaler.                                       See Dr Hudson or me in a few days.                               Jayjay Connor MD  [facility]

## 2017-05-08 NOTE — NURSING NOTE
"Chief Complaint   Patient presents with     Musculoskeletal Problem       Initial /56 (BP Location: Left arm, Patient Position: Chair, Cuff Size: Adult Large)  Pulse 90  Temp 97.5  F (36.4  C)  Resp 20  Ht 5' 10\" (1.778 m)  Wt 244 lb (110.7 kg)  SpO2 100%  BMI 35.01 kg/m2 Estimated body mass index is 35.01 kg/(m^2) as calculated from the following:    Height as of this encounter: 5' 10\" (1.778 m).    Weight as of this encounter: 244 lb (110.7 kg).  Medication Reconciliation: complete   Toshia Burgos LPN  "

## 2017-05-08 NOTE — PATIENT INSTRUCTIONS
I believe you have shingles involving the right leg.                        I have ordered an antiviral medication, and gabapentin to lessen the nerve pain.   This can result in drowsiness as a side effect.                                        You also have a mild flare up of asthma.               I have ordered prednisone for 5 days.                                     You should also use an albuterol inhaler.                                       See Dr Hudson or me in a few days.

## 2017-05-15 ENCOUNTER — OFFICE VISIT (OUTPATIENT)
Dept: FAMILY MEDICINE | Facility: CLINIC | Age: 72
End: 2017-05-15
Payer: COMMERCIAL

## 2017-05-15 ENCOUNTER — RADIANT APPOINTMENT (OUTPATIENT)
Dept: GENERAL RADIOLOGY | Facility: CLINIC | Age: 72
End: 2017-05-15
Attending: FAMILY MEDICINE
Payer: COMMERCIAL

## 2017-05-15 VITALS
TEMPERATURE: 97.7 F | OXYGEN SATURATION: 96 % | HEART RATE: 86 BPM | HEIGHT: 70 IN | DIASTOLIC BLOOD PRESSURE: 56 MMHG | BODY MASS INDEX: 34.86 KG/M2 | SYSTOLIC BLOOD PRESSURE: 110 MMHG | WEIGHT: 243.5 LBS | RESPIRATION RATE: 16 BRPM

## 2017-05-15 DIAGNOSIS — J20.9 ACUTE BRONCHITIS, UNSPECIFIED ORGANISM: Primary | ICD-10-CM

## 2017-05-15 DIAGNOSIS — I10 HYPERTENSION GOAL BP (BLOOD PRESSURE) < 140/80: Chronic | ICD-10-CM

## 2017-05-15 DIAGNOSIS — R06.2 WHEEZING: ICD-10-CM

## 2017-05-15 DIAGNOSIS — J45.21 MILD INTERMITTENT ASTHMA WITH ACUTE EXACERBATION: Chronic | ICD-10-CM

## 2017-05-15 DIAGNOSIS — E11.21 TYPE 2 DIABETES MELLITUS WITH DIABETIC NEPHROPATHY, WITHOUT LONG-TERM CURRENT USE OF INSULIN (H): Chronic | ICD-10-CM

## 2017-05-15 DIAGNOSIS — J45.20 MILD INTERMITTENT ASTHMA WITHOUT COMPLICATION: Chronic | ICD-10-CM

## 2017-05-15 LAB — HBA1C MFR BLD: 8 % (ref 4.3–6)

## 2017-05-15 PROCEDURE — 99214 OFFICE O/P EST MOD 30 MIN: CPT | Performed by: FAMILY MEDICINE

## 2017-05-15 PROCEDURE — 71020 XR CHEST 2 VW: CPT

## 2017-05-15 PROCEDURE — 36415 COLL VENOUS BLD VENIPUNCTURE: CPT | Performed by: FAMILY MEDICINE

## 2017-05-15 PROCEDURE — 83036 HEMOGLOBIN GLYCOSYLATED A1C: CPT | Performed by: FAMILY MEDICINE

## 2017-05-15 RX ORDER — PREDNISONE 20 MG/1
20 TABLET ORAL 2 TIMES DAILY
Qty: 14 TABLET | Refills: 0 | Status: SHIPPED | OUTPATIENT
Start: 2017-05-15 | End: 2017-05-22

## 2017-05-15 RX ORDER — AZITHROMYCIN 500 MG/1
500 TABLET, FILM COATED ORAL DAILY
Qty: 3 TABLET | Refills: 0 | Status: SHIPPED | OUTPATIENT
Start: 2017-05-15 | End: 2017-05-22

## 2017-05-15 RX ORDER — ALBUTEROL SULFATE 90 UG/1
2 AEROSOL, METERED RESPIRATORY (INHALATION) 4 TIMES DAILY PRN
Qty: 1 INHALER | Refills: 11 | Status: SHIPPED | OUTPATIENT
Start: 2017-05-15 | End: 2018-03-06

## 2017-05-15 NOTE — PROGRESS NOTES
SUBJECTIVE:                                                    Suze Devlin is a 72 year old male who presents to clinic today for the following health issues:      RESPIRATORY SYMPTOMS      Duration: 2 WEEKS    Description  cough, fever, headache and FULL FEELING    Severity: moderate    Accompanying signs and symptoms: None    History (predisposing factors):  none    Precipitating or alleviating factors: None    Therapies tried and outcome:  none     WHEEZING AND COUGHING    SHORTNESS OF BREATH AT NIGHT     SPUTUM PRODUCTION    SHINGLES IN IMPROVING     NERVE PAIN IMPROVING   .  Current Outpatient Prescriptions   Medication Sig Dispense Refill     predniSONE (DELTASONE) 20 MG tablet Take 1 tablet (20 mg) by mouth 2 times daily 14 tablet 0     fluticasone-salmeterol (ADVAIR) 100-50 MCG/DOSE diskus inhaler Inhale 1 puff into the lungs 2 times daily 1 Inhaler 3     albuterol (PROAIR HFA/PROVENTIL HFA/VENTOLIN HFA) 108 (90 BASE) MCG/ACT Inhaler Inhale 2 puffs into the lungs 4 times daily as needed 1 Inhaler 11     azithromycin (ZITHROMAX) 500 MG tablet Take 1 tablet (500 mg) by mouth daily 3 tablet 0     valACYclovir (VALTREX) 1000 mg tablet Take 1 tablet (1,000 mg) by mouth 3 times daily 21 tablet 0     polyethylene glycol (MIRALAX) powder Take 17 g (1 capful) by mouth daily 510 g 3     predniSONE (DELTASONE) 20 MG tablet Take 1 tablet (20 mg) by mouth 2 times daily 10 tablet 0     gabapentin (NEURONTIN) 300 MG capsule Take 1 tablet (300 mg) every night for 1 days, then 1 tablet twice daily for 1  days, then 1 tablet three times daily 90 capsule 3     atorvastatin (LIPITOR) 20 MG tablet Take 1 tablet (20 mg) by mouth daily 90 tablet 1     losartan (COZAAR) 25 MG tablet Take 1 tablet (25 mg) by mouth daily 90 tablet 1     aspirin 81 MG EC tablet Take 1 tablet (81 mg) by mouth daily 90 tablet 3     cholecalciferol (VITAMIN D3) 58913 UNITS capsule Take 1 capsule (50,000 Units) by mouth once a week 4 capsule 11      acetaminophen 500 MG CAPS Take 1 tablet by mouth 4 times daily 120 capsule 11     Cyanocobalamin (B-12) 1000 MCG TBCR Take 1,000 mcg by mouth daily 90 tablet 2     blood glucose monitoring (NO BRAND SPECIFIED) test strip Use to test blood sugars 4 times daily or as directed    Contour 400 strip 1     order for DME Lancets.  Four times daily and prn. Fill per patient's insurance.      Microlet lancets 400 each 1     sitagliptin (JANUVIA) 50 MG tablet Take 1 tablet (50 mg) by mouth daily 90 tablet 1     pioglitazone (ACTOS) 15 MG tablet Take 1 tablet (15 mg) by mouth daily 90 tablet 1     glipiZIDE (GLUCOTROL XL) 10 MG 24 hr tablet Take 1 tablet (10 mg) by mouth daily 90 tablet 1     montelukast (SINGULAIR) 10 MG tablet Take 1 tablet (10 mg) by mouth At Bedtime 90 tablet 3     omeprazole (PRILOSEC) 40 MG capsule Take 1 capsule (40 mg) by mouth daily Take 30-60 minutes before a meal. 91 capsule 2     finasteride (PROSCAR) 5 MG tablet Take 1 tablet (5 mg) by mouth daily 90 tablet 3     tamsulosin (FLOMAX) 0.4 MG 24 hr capsule Take 1 capsule (0.4 mg) by mouth daily 90 capsule 3     order for DME Equipment being ordered: Nebulizer  ONE  USE Q 4 HOURS PRN EXACERBATION OF ASTHMA ML  ALBUTEROL 2.5MG PER 3ML   (J45.20) Mild intermittent asthma without complication  (primary encounter diagnosis)    MAKI MORA JR., MD 1 each 1     metFORMIN modified (GLUMETZA) 1000 MG 24 hr tablet Take 1 tablet (1,000 mg) by mouth daily (with dinner) 30 tablet 11     senna-docusate (SENOKOT-S;PERICOLACE) 8.6-50 MG per tablet Take 1 tablet by mouth 2 times daily 120 tablet 11     ipratropium - albuterol 0.5 mg/2.5 mg/3 mL (DUONEB) 0.5-2.5 (3) MG/3ML nebulization Take 1 vial (3 mLs) by nebulization every 6 hours as needed for shortness of breath / dyspnea or wheezing 2.5 mL o     blood glucose monitoring (NO BRAND SPECIFIED) meter device kit Use to test blood sugar two times daily or as directed. 1 kit 0     blood glucose monitoring  (NO BRAND SPECIFIED) test strip Use to test blood sugars two times daily or as directed 100 each 4     diclofenac (VOLTAREN) 1 % GEL Apply 4 grams to knees or 2 grams to hands four times daily using enclosed dosing card. 100 g 1     traMADol (ULTRAM) 50 MG tablet Take 1 tablet (50 mg) by mouth 2 times daily as needed for moderate pain 240 tablet 0     blood glucose monitoring (ONE TOUCH DELICA) lancets Use to test blood sugars  TWICE DAILY    or as directed. 3 Box prn     lidocaine (XYLOCAINE) 5 % ointment One application 4 x daily to affected areas lower back and knees if necessary 300 g 0     tadalafil (CIALIS) 5 MG tablet Take 1 tablet (5 mg) by mouth daily Never use with nitroglycerin, terazosin or doxazosin. 90 tablet 0     acetaminophen (TYLENOL) 650 MG CR tablet Take 1 tablet (650 mg) by mouth 4 times daily 240 tablet 0     triamcinolone (KENALOG) 0.1 % paste Take by mouth 2 times daily 5 g 11     blood glucose test strip 1 strip by In Vitro route daily 100 strip 3     [DISCONTINUED] albuterol (PROAIR HFA/PROVENTIL HFA/VENTOLIN HFA) 108 (90 BASE) MCG/ACT Inhaler Inhale 2 puffs into the lungs 4 times daily as needed 1 Inhaler 11        No Known Allergies    Immunization History   Administered Date(s) Administered     Influenza (High Dose) 3 valent vaccine 10/03/2014, 10/13/2015     Pneumococcal 23 valent 10/17/2014     TD (ADULT, 7+) 04/04/2005, 02/03/2006, 08/08/2006     TDAP Vaccine (Adacel) 10/13/2015     Varicella 04/04/2005, 02/03/2006         reports that he does not drink alcohol.      reports that he does not use illicit drugs.    family history includes Unknown/Adopted in his mother.    indicated that the status of his mother is unknown. He indicated that all of his four daughters are alive. He indicated that all of his five sons are alive.      has a past surgical history that includes hernia repair, inguinal rt/lt.     reports that he does not engage in sexual activity.  .  Pediatric History    Patient Guardian Status     Not on file.     Other Topics Concern     Parent/Sibling W/ Cabg, Mi Or Angioplasty Before 65f 55m? No     Social History Narrative         reports that he has never smoked. He has never used smokeless tobacco.    Medical, social, surgical, and family histories reviewed.    Problem list, Medication list, Allergies, and Medical/Social/Surgical histories reviewed in [facility] and updated as appropriate.  Labs reviewed in EPIC  Patient Active Problem List   Diagnosis     Health Care Home     Cataract     Elevated prostate specific antigen (PSA)     Psychophysiological insomnia     Vitamin D deficiency     Hypertrophy of prostate without urinary obstruction     Memory loss     Anxiety state     Gastroesophageal reflux disease without esophagitis     Primary localized osteoarthrosis, lower leg     Vitamin D insufficiency     Chronic idiopathic constipation     Vitamin B 12 deficiency     Hypertension goal BP (blood pressure) < 140/80     Chronic fatigue     Hyperlipidemia LDL goal <100     GERD (gastroesophageal reflux disease)     Asthma, mild intermittent     Diabetic neuropathy (H)     Lumbago     ACP (advance care planning)     Type 2 diabetes mellitus with diabetic nephropathy (H)     Mild intermittent asthma without complication     Past Surgical History:   Procedure Laterality Date     HERNIA REPAIR, INGUINAL RT/LT         Social History   Substance Use Topics     Smoking status: Never Smoker     Smokeless tobacco: Never Used     Alcohol use No     Family History   Problem Relation Age of Onset     Unknown/Adopted Mother      medical history of family is unknown.         No Known Allergies  Recent Labs   Lab Test  05/15/17   1209  10/31/16   1459  03/11/16   1000   03/11/15   1054  10/03/14   1114  03/14/14   1137   A1C  8.0*  6.7*  9.7*   < >  7.9*  6.3*  6.9*   LDL   --   85  135*   --   78  94  126   HDL   --   47  55   --   49  53  44   TRIG   --   146  122   --   134  93  149   ALT    --   28   --    --   19  23  18   CR   --   1.04  1.00   < >  1.00  1.10   --    GFRESTIMATED   --   70  73   < >  74  66   --    GFRESTBLACK   --   85  89   < >  89  80   --    POTASSIUM   --   4.4  5.3   < >  4.2  4.3   --    TSH   --    --   4.36   --    --    --   5.59*    < > = values in this interval not displayed.        BP Readings from Last 6 Encounters:   05/15/17 110/56   05/08/17 116/56   03/10/17 114/60   10/31/16 108/54   04/15/16 124/62   04/07/16 124/64       Wt Readings from Last 3 Encounters:   05/15/17 243 lb 8 oz (110.5 kg)   05/08/17 244 lb (110.7 kg)   03/10/17 245 lb (111.1 kg)         Positive symptoms or findings indicated by bold designation:     ROS: 10 point ROS neg other than the symptoms noted above in the HPI.except  has Health Care Home; Prostatitis; Cataract; Elevated prostate specific antigen (PSA); Insomnia; Vitamin D deficiency; Hypertrophy of prostate without urinary obstruction; Memory loss; Anxiety state; Esophageal reflux; Primary localized osteoarthrosis, lower leg; Vitamin D insufficiency; Constipation; Vitamin B 12 deficiency; Hypertension goal BP (blood pressure) < 140/80; Chronic fatigue; Hyperlipidemia LDL goal <100; GERD (gastroesophageal reflux disease); Asthma, mild intermittent; Diabetic neuropathy (H); Lumbago; ACP (advance care planning); Type 2 diabetes mellitus with diabetic nephropathy (H); and Mild intermittent asthma without complication on his problem list.   Constitutional: The patient denied fatigue, fever, insomnia, night sweats, recent illness and weight loss.   OBESITY     Eyes: The patient denied blindness, eye pain, eye tearing, photophobia, vision change and visual disturbance. NORMAL VISION       Ears/Nose/Throat/Neck: The patient denied dizziness, facial pain, hearing loss, nasal discharge, oral pain, otalgia, postnasal drip, sinus congestion, sore throat, tinnitus and voice change.   NORMAL HEARING AND BALANCE     Cardiovascular: The patient denied  "arrhythmia, chest pain/pressure, claudication, edema, exercise intolerance, fatigue, orthopnea, palpitations and syncope.  NORMAL     Respiratory: The patient denied asthma, chest congestion, cough, dyspnea on exertion, dyspnea/shortness of breath, hemoptysis, pedal edema, pleuritic pain, productive sputum, snoring and wheezing. NORMAL     Gastrointestinal: The patient denied abdominal pain, anorexia, constipation, diarrhea, dysphagia, gastroesophageal reflux, hematochezia, hemorrhoids, melena, nausea and vomiting . NORMAL     Genitourinary/Nephrology: The patient denied breast complaint, dysuria, nocturia sexual dysfunction, t, urinary frequency, urinary incontinence, urinary urgency    NORMAL     Musculoskeletal: The patient denied arthralgia(s), back pain, joint complaint, muscle weakness, myalgias, osteoporosis, sciatica, stiffness and swelling.  MUSCLES ACHES AND RIGHT UPPER     Dermatoligic:: The patient denied acne, dermatitis, ecchymosis, itching, mole change, rash, skin cancer, skin lesion and sores.  NORMAL SHINGLES     Neurologic: The patient denied dizziness, gait abnormality, headache, memory loss, mental status change, paresis, paresthesia, seizure, syncope, tremor and vision change. NORMAL      Psychiatric: The patient denied anxiety, depression, disturbances of memory, drug abuse, insomnia, mood swings and relationship difficulties.  NORMAL       Endocrine: The patient denied , goiter, obesity, polyuria and thyroid disease.  NORMAL      Hematologic/Lymphatic: The patient denied abnormal bleeding and bruising, abnormal ecchymoses, anemia, lymph node enlargement/mass, petechiae and venous  Thrombosis.  NORMAL      Allergy/Immunology: The patient denied food allergy and  Allergic rhinitis or conjunctivitis.  NORMAL        PE:  /56  Pulse 86  Temp 97.7  F (36.5  C) (Tympanic)  Resp 16  Ht 5' 10\" (1.778 m)  Wt 243 lb 8 oz (110.5 kg)  SpO2 96%  BMI 34.94 kg/m2 Body mass index is 34.94 " kg/(m^2).    Constitutional: general appearance, well nourished, well developed, in no acute distress, well developed, appears stated age, normal body habitus,      Eyes:; The patient has normal eyelids sclerae and conjunctivae : NORMAL       Ears/Nose/Throat: external ear, overall: normal appearance; external nose, overall: benign appearance, normal moujth gums and lips  The patient has:  NORMAL  RHINITIS     Neck: thyroid, overall: normal size, normal consistency, nontender,  NORMAL      Respiratory:  palpation of chest, overall: normal excursion,  NORMAL     Clear to percussion and auscultation NORMAL     Tachypnea  NORMAL  Color  NORMAL     Cardiovascular:  Good color with no peripheral edema  NORMAL   Regular sinus rhythm without murmur. Physiologic heart sounds Heart is unelarged  .   Chest/Breast: normal shape  NORMAL      Abdominal exam,  Liver and spleen are  unenlarged  NORMAL       Tenderness  NORMAL   Scars  NORMAL     Urogenital; no renal, flank or bladder  tenderness;  NORMAL     Lymphatic: neck nodes,  NORMAL    Other nodes NORMAL     Musculoskeletal:  Brief ortho exam normal except:   NORMAL     Integument: inspection of skin, no rash, lesions; and, palpation, no induration, no tenderness.  NORMAL     Neurologic mental status, overall: alert and oriented; gait, no ataxia, no unsteadiness; coordination, no tremors; cranial nerves, overall: normal motor, overall: normal bulk, tone.  NORMAL     Psychiatric: orientation/consciousness, overall: oriented to person, place and time; behavior/psychomotor activity, no tics, normal psychomotor activity; mood and affect, overall: normal mood and affect; appearance, overall: well-groomed, good eye contact; speech, overall: normal quality, no aphasia and normal quality, quantity, intact.  NORMAL     Diagnostic Test Results:  Results for orders placed or performed in visit on 05/15/17   HEMOGLOBIN A1C   Result Value Ref Range    Hemoglobin A1C 8.0 (H) 4.3 - 6.0 %          ICD-10-CM    1. Acute bronchitis, unspecified organism J20.9 XR Chest 2 Views     predniSONE (DELTASONE) 20 MG tablet     fluticasone-salmeterol (ADVAIR) 100-50 MCG/DOSE diskus inhaler   2. Type 2 diabetes mellitus with diabetic nephropathy, without long-term current use of insulin (H) E11.21 HEMOGLOBIN A1C   3. Wheezing R06.2 albuterol (PROAIR HFA/PROVENTIL HFA/VENTOLIN HFA) 108 (90 BASE) MCG/ACT Inhaler   4. Mild intermittent asthma with acute exacerbation J45.21 albuterol (PROAIR HFA/PROVENTIL HFA/VENTOLIN HFA) 108 (90 BASE) MCG/ACT Inhaler     azithromycin (ZITHROMAX) 500 MG tablet   5. Hypertension goal BP (blood pressure) < 140/80 I10    6. Mild intermittent asthma without complication J45.20         .    Side effects benefits and risks thoroughly discussed. .he may come in early if unimproved or getting worse          Importance of adhering to regimen discussed and if medications were dispensed, the importance of taking medications discussed and bringing in the medications after every visit for chronic problems         Please drink 2 glasses of water prior to meals and walk 15-30 minutes after meals    I spent 25 MINUTES SPENT  with patient discussing the following issues   The primary encounter diagnosis was Acute bronchitis, unspecified organism. Diagnoses of Type 2 diabetes mellitus with diabetic nephropathy, without long-term current use of insulin (H), Wheezing, Mild intermittent asthma with acute exacerbation, Hypertension goal BP (blood pressure) < 140/80, and Mild intermittent asthma without complication were also pertinent to this visit. over half of which involved counseling and coordination of care.    Patient Instructions   (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, without long-term current use of insulin (H)  (primary encounter diagnosis)  Comment:    Plan: HEMOGLOBIN A1C             (J20.9) Acute bronchitis, unspecified organism  Comment:    Plan: XR Chest 2 Views,  predniSONE (DELTASONE) 20 MG         tablet, fluticasone-salmeterol (ADVAIR) 100-50         MCG/DOSE diskus inhaler             (R06.2) Wheezing  Comment:    Plan: albuterol (PROAIR HFA/PROVENTIL HFA/VENTOLIN         HFA) 108 (90 BASE) MCG/ACT Inhaler             (J45.21) Mild intermittent asthma with acute exacerbation  Comment:    Plan: albuterol (PROAIR HFA/PROVENTIL HFA/VENTOLIN         HFA) 108 (90 BASE) MCG/ACT Inhaler,         azithromycin (ZITHROMAX) 500 MG tablet  PLAN AZITHROMYCIN ONE DAILY with FOOD   MAKI MORA JR., MD                         ALL THE ABOVE PROBLEMS ARE STABLE AND MED CHANGES AS NOTED    Diet: MEDITERRANEAN DIET     Exercise:  NORMAL   Exercises Range of motion, balance, isometric, and strengthening exercises 30 repetitions twice daily of involved joints      .MAKI MORA MD 5/15/2017 12:28 PM  May 15, 2017

## 2017-05-15 NOTE — LETTER
[facility]  [address]  [address]  [place] [address]  [phone]                                                                                                           Suze Devlin  [address]   [place] [place] [address]    May 16, 2017      Dear Suze,    The results of your recent tests were reviewed and are enclosed.   CHEST XRAY SUGGEST EARLY INFILTRATE   KEEP ON ANTIBIOTIC   STEROIDS AND INHALERS   FOLLOW UP  ONE WEEK   MAKI MORA JR., MD    Results for orders placed or performed in visit on 05/15/17   XR Chest 2 Views    Narrative    CHEST TWO VIEWS 5/15/2017 11:48 AM     HISTORY: Acute bronchitis, unspecified.     COMPARISON: Chest x-ray 6/15/2012.      Impression    IMPRESSION: PA and lateral views of the chest. Subtle infiltrate is  noted in the lateral left lung base, possibly indicating changes of  early pneumonia. Lungs are otherwise clear. Heart is normal in size.  No effusions are evident. No pneumothorax.    JAYLIN DICKERSON MD   HEMOGLOBIN A1C   Result Value Ref Range    Hemoglobin A1C 8.0 (H) 4.3 - 6.0 %           Thank you for choosing [facility].  We appreciate the opportunity to serve you and look forward to supporting your healthcare needs in the future.    If you have any questions or concerns, please call me or my staff at [phone].      Sincerely,    Maki Mora Jr MD

## 2017-05-15 NOTE — MR AVS SNAPSHOT
After Visit Summary   5/15/2017    Suze Devlin    MRN: [id number]           Patient Information     Date Of Birth          1945        Visit Information        Provider Department      5/15/2017 11:00 AM Maki Hudson MD; [facility] [employer] [facility]        Today's Diagnoses     Type 2 diabetes mellitus with diabetic nephropathy, without long-term current use of insulin (H)    -  1    Acute bronchitis, unspecified organism        Wheezing        Mild intermittent asthma with acute exacerbation          Care Instructions    (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, without long-term current use of insulin (H)  (primary encounter diagnosis)  Comment:    Plan: HEMOGLOBIN A1C             (J20.9) Acute bronchitis, unspecified organism  Comment:    Plan: XR Chest 2 Views, predniSONE (DELTASONE) 20 MG         tablet, fluticasone-salmeterol (ADVAIR) 100-50         MCG/DOSE diskus inhaler             (R06.2) Wheezing  Comment:    Plan: albuterol (PROAIR HFA/PROVENTIL HFA/VENTOLIN         HFA) 108 (90 BASE) MCG/ACT Inhaler             (J45.21) Mild intermittent asthma with acute exacerbation  Comment:    Plan: albuterol (PROAIR HFA/PROVENTIL HFA/VENTOLIN         HFA) 108 (90 BASE) MCG/ACT Inhaler,         azithromycin (ZITHROMAX) 500 MG tablet  PLAN AZITHROMYCIN ONE DAILY with FOOD   MAKI HUDSON JR., MD                         Follow-ups after your visit        Who to contact     If you have questions or need follow up information about today's clinic visit or your schedule please contact [facility] directly at [phone].  Normal or non-critical lab and imaging results will be communicated to you by MyChart, letter or phone within 4 business days after the clinic has received the results. If you do not hear from us within 7 days, please contact the clinic through MyChart or phone.  "If you have a critical or abnormal lab result, we will notify you by phone as soon as possible.  Submit refill requests through [employer] or call your pharmacy and they will forward the refill request to us. Please allow 3 business days for your refill to be completed.          Additional Information About Your Visit        Care EveryWhere ID     This is your Care EveryWhere ID. This could be used by other organizations to access your [place] medical records  [id number]        Your Vitals Were     Pulse Temperature Respirations Height Pulse Oximetry BMI (Body Mass Index)    86 97.7  F (36.5  C) (Tympanic) 16 5' 10\" (1.778 m) 96% 34.94 kg/m2       Blood Pressure from Last 3 Encounters:   05/15/17 110/56   05/08/17 116/56   03/10/17 114/60    Weight from Last 3 Encounters:   05/15/17 243 lb 8 oz (110.5 kg)   05/08/17 244 lb (110.7 kg)   03/10/17 245 lb (111.1 kg)              We Performed the Following     HEMOGLOBIN A1C     XR Chest 2 Views          Today's Medication Changes          These changes are accurate as of: 5/15/17 12:08 PM.  If you have any questions, ask your nurse or doctor.               Start taking these medicines.        Dose/Directions    azithromycin 500 MG tablet   Commonly known as:  ZITHROMAX   Used for:  Mild intermittent asthma with acute exacerbation   Started by:  Zack Hudson MD        Dose:  500 mg   Take 1 tablet (500 mg) by mouth daily   Quantity:  3 tablet   Refills:  0       fluticasone-salmeterol 100-50 MCG/DOSE diskus inhaler   Commonly known as:  ADVAIR   Used for:  Acute bronchitis, unspecified organism   Started by:  Zack Hudson MD        Dose:  1 puff   Inhale 1 puff into the lungs 2 times daily   Quantity:  1 Inhaler   Refills:  3         These medicines have changed or have updated prescriptions.        Dose/Directions    * predniSONE 20 MG tablet   Commonly known as:  DELTASONE   This may have changed:  Another medication with the same name was " added. Make sure you understand how and when to take each.   Used for:  Mild intermittent asthma without complication   Changed by:  Jayjay Connor MD        Dose:  20 mg   Take 1 tablet (20 mg) by mouth 2 times daily   Quantity:  10 tablet   Refills:  0       * predniSONE 20 MG tablet   Commonly known as:  DELTASONE   This may have changed:  You were already taking a medication with the same name, and this prescription was added. Make sure you understand how and when to take each.   Used for:  Acute bronchitis, unspecified organism   Changed by:  Zack Hudson MD        Dose:  20 mg   Take 1 tablet (20 mg) by mouth 2 times daily   Quantity:  14 tablet   Refills:  0       * Notice:  This list has 2 medication(s) that are the same as other medications prescribed for you. Read the directions carefully, and ask your doctor or other care provider to review them with you.         Where to get your medicines      These medications were sent to [facility], [address] [address]     Phone:  [phone]     albuterol 108 (90 BASE) MCG/ACT Inhaler    azithromycin 500 MG tablet    fluticasone-salmeterol 100-50 MCG/DOSE diskus inhaler         Call your pharmacy to confirm that your medication is ready for pickup. It may take up to 24 hours for them to receive the prescription. If the prescription is not ready within 3 business days, please contact your clinic or your provider.     We will let you know when these medications are ready. If you don't hear back within 3 business days, please contact us.     predniSONE 20 MG tablet                Primary Care Provider Office Phone # Fax #    Zack Hudosn [phone]       [facility] [address] [facility] [address]        Thank you!     Thank you for choosing [facility]  for your care. Our goal is always to  provide you with excellent care. Hearing back from our patients is one way we can continue to improve our services. Please take a few minutes to complete the written survey that you may receive in the mail after your visit with us. Thank you!             Your Updated Medication List - Protect others around you: Learn how to safely use, store and throw away your medicines at www.disposemymeds.org.          This list is accurate as of: 5/15/17 12:08 PM.  Always use your most recent med list.                   Brand Name Dispense Instructions for use    * acetaminophen 650 MG CR tablet    TYLENOL    240 tablet    Take 1 tablet (650 mg) by mouth 4 times daily       * acetaminophen 500 MG Caps     120 capsule    Take 1 tablet by mouth 4 times daily       albuterol 108 (90 BASE) MCG/ACT Inhaler    PROAIR HFA/PROVENTIL HFA/VENTOLIN HFA    1 Inhaler    Inhale 2 puffs into the lungs 4 times daily as needed       aspirin 81 MG EC tablet     90 tablet    Take 1 tablet (81 mg) by mouth daily       atorvastatin 20 MG tablet    LIPITOR    90 tablet    Take 1 tablet (20 mg) by mouth daily       azithromycin 500 MG tablet    ZITHROMAX    3 tablet    Take 1 tablet (500 mg) by mouth daily       B-12 1000 MCG Tbcr     90 tablet    Take 1,000 mcg by mouth daily       blood glucose monitoring lancets     3 Box    Use to test blood sugars  TWICE DAILY    or as directed.       blood glucose monitoring meter device kit    no brand specified    1 kit    Use to test blood sugar two times daily or as directed.       * blood glucose monitoring test strip    no brand specified    100 strip    1 strip by In Vitro route daily       * blood glucose monitoring test strip    no brand specified    100 each    Use to test blood sugars two times daily or as directed       * blood glucose monitoring test strip    no brand specified    400 strip    Use to test blood sugars 4 times daily or as directed  Contour       cholecalciferol 33465 UNITS capsule     VITAMIN D3    4 capsule    Take 1 capsule (50,000 Units) by mouth once a week       diclofenac 1 % Gel topical gel    VOLTAREN    100 g    Apply 4 grams to knees or 2 grams to hands four times daily using enclosed dosing card.       finasteride 5 MG tablet    PROSCAR    90 tablet    Take 1 tablet (5 mg) by mouth daily       fluticasone-salmeterol 100-50 MCG/DOSE diskus inhaler    ADVAIR    1 Inhaler    Inhale 1 puff into the lungs 2 times daily       gabapentin 300 MG capsule    NEURONTIN    90 capsule    Take 1 tablet (300 mg) every night for 1 days, then 1 tablet twice daily for 1  days, then 1 tablet three times daily       glipiZIDE 10 MG 24 hr tablet    GLUCOTROL XL    90 tablet    Take 1 tablet (10 mg) by mouth daily       ipratropium - albuterol 0.5 mg/2.5 mg/3 mL 0.5-2.5 (3) MG/3ML neb solution    DUONEB    2.5 mL    Take 1 vial (3 mLs) by nebulization every 6 hours as needed for shortness of breath / dyspnea or wheezing       lidocaine 5 % ointment    XYLOCAINE    300 g    One application 4 x daily to affected areas lower back and knees if necessary       losartan 25 MG tablet    COZAAR    90 tablet    Take 1 tablet (25 mg) by mouth daily       metFORMIN modified 1000 MG 24 hr tablet    GLUMETZA    30 tablet    Take 1 tablet (1,000 mg) by mouth daily (with dinner)       montelukast 10 MG tablet    SINGULAIR    90 tablet    Take 1 tablet (10 mg) by mouth At Bedtime       omeprazole 40 MG capsule    priLOSEC    91 capsule    Take 1 capsule (40 mg) by mouth daily Take 30-60 minutes before a meal.       * order for DME     1 each    Equipment being ordered: Nebulizer ONE USE Q 4 HOURS PRN EXACERBATION OF ASTHMA ML ALBUTEROL 2.5MG PER 3ML  (J45.20) Mild intermittent asthma without complication  (primary encounter diagnosis)  MAKI MORA JR., MD       * order for DME     400 each    Lancets.  Four times daily and prn. Fill per patient's insurance.   Microlet lancets       pioglitazone 15 MG tablet    ACTOS     90 tablet    Take 1 tablet (15 mg) by mouth daily       polyethylene glycol powder    MIRALAX    510 g    Take 17 g (1 capful) by mouth daily       * predniSONE 20 MG tablet    DELTASONE    10 tablet    Take 1 tablet (20 mg) by mouth 2 times daily       * predniSONE 20 MG tablet    DELTASONE    14 tablet    Take 1 tablet (20 mg) by mouth 2 times daily       senna-docusate 8.6-50 MG per tablet    SENOKOT-S;PERICOLACE    120 tablet    Take 1 tablet by mouth 2 times daily       sitagliptin 50 MG tablet    JANUVIA    90 tablet    Take 1 tablet (50 mg) by mouth daily       tadalafil 5 MG tablet    CIALIS    90 tablet    Take 1 tablet (5 mg) by mouth daily Never use with nitroglycerin, terazosin or doxazosin.       tamsulosin 0.4 MG capsule    FLOMAX    90 capsule    Take 1 capsule (0.4 mg) by mouth daily       traMADol 50 MG tablet    ULTRAM    240 tablet    Take 1 tablet (50 mg) by mouth 2 times daily as needed for moderate pain       triamcinolone 0.1 % paste    KENALOG    5 g    Take by mouth 2 times daily       valACYclovir 1000 mg tablet    VALTREX    21 tablet    Take 1 tablet (1,000 mg) by mouth 3 times daily       * Notice:  This list has 9 medication(s) that are the same as other medications prescribed for you. Read the directions carefully, and ask your doctor or other care provider to review them with you.

## 2017-05-15 NOTE — NURSING NOTE
"Chief Complaint   Patient presents with     URI       Initial /56  Pulse 86  Temp 97.7  F (36.5  C) (Tympanic)  Resp 16  Ht 5' 10\" (1.778 m)  Wt 243 lb 8 oz (110.5 kg)  SpO2 96%  BMI 34.94 kg/m2 Estimated body mass index is 34.94 kg/(m^2) as calculated from the following:    Height as of this encounter: 5' 10\" (1.778 m).    Weight as of this encounter: 243 lb 8 oz (110.5 kg).  Medication Reconciliation: complete   .Lazara KOEHLER      "

## 2017-05-15 NOTE — LETTER
[facility]  [address]  [address]  [place] [address] [phone]                                                                                                           Suze Devlin  [address]   [facility] [address]    May 16, 2017      Dear Suze,    The results of your recent tests were reviewed and are enclosed.   BORDERLINE  THREE MONTH GLUCOSE AVERAGE   FROM RECENT STEROID USAGE   REPEAT IN 3-4 MONTHS   Results for orders placed or performed in visit on 05/15/17   XR Chest 2 Views    Narrative    CHEST TWO VIEWS 5/15/2017 11:48 AM     HISTORY: Acute bronchitis, unspecified.     COMPARISON: Chest x-ray 6/15/2012.      Impression    IMPRESSION: PA and lateral views of the chest. Subtle infiltrate is  noted in the lateral left lung base, possibly indicating changes of  early pneumonia. Lungs are otherwise clear. Heart is normal in size.  No effusions are evident. No pneumothorax.    JAYLIN DICKERSON MD   HEMOGLOBIN A1C   Result Value Ref Range    Hemoglobin A1C 8.0 (H) 4.3 - 6.0 %           Thank you for choosing [facility].  We appreciate the opportunity to serve you and look forward to supporting your healthcare needs in the future.    If you have any questions or concerns, please call me or my staff at [phone].      Sincerely,    Zack Hudson Jr MD

## 2017-05-15 NOTE — PATIENT INSTRUCTIONS
(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, without long-term current use of insulin (H)  (primary encounter diagnosis)  Comment:    Plan: HEMOGLOBIN A1C             (J20.9) Acute bronchitis, unspecified organism  Comment:    Plan: XR Chest 2 Views, predniSONE (DELTASONE) 20 MG         tablet, fluticasone-salmeterol (ADVAIR) 100-50         MCG/DOSE diskus inhaler             (R06.2) Wheezing  Comment:    Plan: albuterol (PROAIR HFA/PROVENTIL HFA/VENTOLIN         HFA) 108 (90 BASE) MCG/ACT Inhaler             (J45.21) Mild intermittent asthma with acute exacerbation  Comment:    Plan: albuterol (PROAIR HFA/PROVENTIL HFA/VENTOLIN         HFA) 108 (90 BASE) MCG/ACT Inhaler,         azithromycin (ZITHROMAX) 500 MG tablet  PLAN AZITHROMYCIN ONE DAILY with FOOD   MAKI MORA JR., MD

## 2017-05-16 ASSESSMENT — ASTHMA QUESTIONNAIRES: ACT_TOTALSCORE: 8

## 2017-05-16 NOTE — PROGRESS NOTES
Please send normal lab letter when labs are complete  CHEST XRAY SUGGEST EARLY INFILTRATE  KEEP ON ANTIBIOTIC   STEROIDS AND INHALERS   FOLLOW UP  ONE WEEK   MAKI MORA JR., MD    Current Outpatient Prescriptions:  predniSONE (DELTASONE) 20 MG tablet  fluticasone-salmeterol (ADVAIR) 100-50 MCG/DOSE diskus inhaler  albuterol (PROAIR HFA/PROVENTIL HFA/VENTOLIN HFA) 108 (90 BASE) MCG/ACT Inhaler  azithromycin (ZITHROMAX) 500 MG tablet  valACYclovir (VALTREX) 1000 mg tablet  polyethylene glycol (MIRALAX) powder  predniSONE (DELTASONE) 20 MG tablet  gabapentin (NEURONTIN) 300 MG capsule  atorvastatin (LIPITOR) 20 MG tablet  losartan (COZAAR) 25 MG tablet  aspirin 81 MG EC tablet  cholecalciferol (VITAMIN D3) 60071 UNITS capsule  acetaminophen 500 MG CAPS  Cyanocobalamin (B-12) 1000 MCG TBCR  blood glucose monitoring (NO BRAND SPECIFIED) test strip  order for DME  sitagliptin (JANUVIA) 50 MG tablet  pioglitazone (ACTOS) 15 MG tablet  glipiZIDE (GLUCOTROL XL) 10 MG 24 hr tablet  montelukast (SINGULAIR) 10 MG tablet  omeprazole (PRILOSEC) 40 MG capsule  finasteride (PROSCAR) 5 MG tablet  tamsulosin (FLOMAX) 0.4 MG 24 hr capsule  order for DME  metFORMIN modified (GLUMETZA) 1000 MG 24 hr tablet  senna-docusate (SENOKOT-S;PERICOLACE) 8.6-50 MG per tablet  ipratropium - albuterol 0.5 mg/2.5 mg/3 mL (DUONEB) 0.5-2.5 (3) MG/3ML nebulization  blood glucose monitoring (NO BRAND SPECIFIED) meter device kit  blood glucose monitoring (NO BRAND SPECIFIED) test strip  diclofenac (VOLTAREN) 1 % GEL  traMADol (ULTRAM) 50 MG tablet  blood glucose monitoring (ONE TOUCH DELICA) lancets  lidocaine (XYLOCAINE) 5 % ointment  tadalafil (CIALIS) 5 MG tablet  acetaminophen (TYLENOL) 650 MG CR tablet  triamcinolone (KENALOG) 0.1 % paste  blood glucose test strip  [DISCONTINUED] albuterol (PROAIR HFA/PROVENTIL HFA/VENTOLIN HFA) 108 (90 BASE) MCG/ACT Inhaler    No current facility-administered medications for this visit.

## 2017-05-22 ENCOUNTER — OFFICE VISIT (OUTPATIENT)
Dept: FAMILY MEDICINE | Facility: CLINIC | Age: 72
End: 2017-05-22
Payer: COMMERCIAL

## 2017-05-22 VITALS
SYSTOLIC BLOOD PRESSURE: 104 MMHG | BODY MASS INDEX: 35.44 KG/M2 | RESPIRATION RATE: 16 BRPM | HEART RATE: 90 BPM | TEMPERATURE: 97.9 F | DIASTOLIC BLOOD PRESSURE: 54 MMHG | WEIGHT: 247 LBS | OXYGEN SATURATION: 98 %

## 2017-05-22 DIAGNOSIS — E11.21 TYPE 2 DIABETES MELLITUS WITH DIABETIC NEPHROPATHY, WITHOUT LONG-TERM CURRENT USE OF INSULIN (H): ICD-10-CM

## 2017-05-22 DIAGNOSIS — J30.1 SEASONAL ALLERGIC RHINITIS DUE TO POLLEN: Primary | ICD-10-CM

## 2017-05-22 DIAGNOSIS — R35.1 NOCTURIA: ICD-10-CM

## 2017-05-22 DIAGNOSIS — M54.50 CHRONIC MIDLINE LOW BACK PAIN WITHOUT SCIATICA: ICD-10-CM

## 2017-05-22 DIAGNOSIS — N40.1 BPH ASSOCIATED WITH NOCTURIA: ICD-10-CM

## 2017-05-22 DIAGNOSIS — G89.29 CHRONIC MIDLINE LOW BACK PAIN WITHOUT SCIATICA: ICD-10-CM

## 2017-05-22 DIAGNOSIS — K59.01 SLOW TRANSIT CONSTIPATION: ICD-10-CM

## 2017-05-22 DIAGNOSIS — R35.1 BPH ASSOCIATED WITH NOCTURIA: ICD-10-CM

## 2017-05-22 DIAGNOSIS — R30.0 DYSURIA: ICD-10-CM

## 2017-05-22 LAB
ALBUMIN UR-MCNC: NEGATIVE MG/DL
APPEARANCE UR: CLEAR
BILIRUB UR QL STRIP: NEGATIVE
COLOR UR AUTO: YELLOW
GLUCOSE UR STRIP-MCNC: >=1000 MG/DL
HGB UR QL STRIP: NEGATIVE
KETONES UR STRIP-MCNC: NEGATIVE MG/DL
LEUKOCYTE ESTERASE UR QL STRIP: NEGATIVE
NITRATE UR QL: NEGATIVE
PH UR STRIP: 5.5 PH (ref 5–7)
SP GR UR STRIP: <=1.005 (ref 1–1.03)
URN SPEC COLLECT METH UR: ABNORMAL
UROBILINOGEN UR STRIP-ACNC: 0.2 EU/DL (ref 0.2–1)

## 2017-05-22 PROCEDURE — 99214 OFFICE O/P EST MOD 30 MIN: CPT | Performed by: FAMILY MEDICINE

## 2017-05-22 PROCEDURE — 81003 URINALYSIS AUTO W/O SCOPE: CPT | Performed by: FAMILY MEDICINE

## 2017-05-22 RX ORDER — TRAMADOL HYDROCHLORIDE 50 MG/1
50 TABLET ORAL 2 TIMES DAILY PRN
Qty: 240 TABLET | Refills: 0 | Status: SHIPPED | OUTPATIENT
Start: 2017-05-22 | End: 2017-11-03

## 2017-05-22 RX ORDER — TAMSULOSIN HYDROCHLORIDE 0.4 MG/1
0.4 CAPSULE ORAL DAILY
Qty: 90 CAPSULE | Refills: 3 | Status: SHIPPED | OUTPATIENT
Start: 2017-05-22 | End: 2018-12-01

## 2017-05-22 RX ORDER — CEPHALEXIN 500 MG/1
500 CAPSULE ORAL 3 TIMES DAILY
Qty: 21 CAPSULE | Refills: 0 | Status: SHIPPED | OUTPATIENT
Start: 2017-05-22 | End: 2017-09-12

## 2017-05-22 RX ORDER — TRAMADOL HYDROCHLORIDE 50 MG/1
50 TABLET ORAL 2 TIMES DAILY PRN
Qty: 240 TABLET | Refills: 0 | Status: SHIPPED | OUTPATIENT
Start: 2017-05-22 | End: 2017-05-22

## 2017-05-22 RX ORDER — FINASTERIDE 5 MG/1
5 TABLET, FILM COATED ORAL DAILY
Qty: 90 TABLET | Refills: 3 | Status: SHIPPED | OUTPATIENT
Start: 2017-05-22 | End: 2018-06-01

## 2017-05-22 RX ORDER — AMOXICILLIN 250 MG
1 CAPSULE ORAL 2 TIMES DAILY
Qty: 120 TABLET | Refills: 11 | Status: SHIPPED | OUTPATIENT
Start: 2017-05-22 | End: 2017-11-17

## 2017-05-22 RX ORDER — CETIRIZINE HYDROCHLORIDE 10 MG/1
10 TABLET ORAL EVERY EVENING
Qty: 30 TABLET | Refills: 1 | Status: SHIPPED | OUTPATIENT
Start: 2017-05-22 | End: 2017-05-26

## 2017-05-22 RX ORDER — METFORMIN HYDROCHLORIDE 1000 MG/1
1000 TABLET, FILM COATED, EXTENDED RELEASE ORAL
Qty: 30 TABLET | Refills: 11 | Status: SHIPPED | OUTPATIENT
Start: 2017-05-22 | End: 2018-06-01

## 2017-05-22 NOTE — MR AVS SNAPSHOT
After Visit Summary   5/22/2017    Suze Devlin    MRN: [id number]           Patient Information     Date Of Birth          1945        Visit Information        Provider Department      5/22/2017 3:00 PM Zack Hudson MD; [employer] [employer] [facility]        Today's Diagnoses     Seasonal allergic rhinitis due to pollen    -  1    Slow transit constipation        Nocturia        BPH associated with nocturia        Type 2 diabetes mellitus with diabetic nephropathy, without long-term current use of insulin (H)        Chronic midline low back pain without sciatica        Dysuria          Care Instructions    (J30.1) Seasonal allergic rhinitis due to pollen  (primary encounter diagnosis)  Comment:    Plan: cetirizine (ZYRTEC) 10 MG tablet             (K59.01) Slow transit constipation  Comment:    Plan: senna-docusate (SENOKOT-S;PERICOLACE) 8.6-50 MG        per tablet, metFORMIN modified (GLUMETZA) 1000         MG 24 hr tablet             (R35.1) Nocturia  Comment:    Plan: tamsulosin (FLOMAX) 0.4 MG capsule, UA reflex         to Microscopic and Culture, cephALEXin (KEFLEX)        500 MG capsule             (N40.1,  R35.1) BPH associated with nocturia  Comment:    Plan: finasteride (PROSCAR) 5 MG tablet, cephALEXin         (KEFLEX) 500 MG capsule             (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, without long-term current use of insulin (H)  Comment:    Plan:      (M54.5,  G89.29) Chronic midline low back pain without sciatica  Comment:    Plan: traMADol (ULTRAM) 50 MG tablet, DISCONTINUED:         traMADol (ULTRAM) 50 MG tablet             (R30.0) Dysuria  Comment:    Plan: UA reflex to Microscopic and Culture,         cephALEXin (KEFLEX) 500 MG capsule                       Follow-ups after your visit        Who to contact     If you have questions or need follow up information about today's clinic visit or your  schedule please contact [facility] directly at [phone].  Normal or non-critical lab and imaging results will be communicated to you by MyChart, letter or phone within 4 business days after the clinic has received the results. If you do not hear from us within 7 days, please contact the clinic through MyChart or phone. If you have a critical or abnormal lab result, we will notify you by phone as soon as possible.  Submit refill requests through [employer] or call your pharmacy and they will forward the refill request to us. Please allow 3 business days for your refill to be completed.          Additional Information About Your Visit        Care EveryWhere ID     This is your Care EveryWhere ID. This could be used by other organizations to access your [facility] medical records  [id number]        Your Vitals Were     Pulse Temperature Respirations Pulse Oximetry BMI (Body Mass Index)       90 97.9  F (36.6  C) (Tympanic) 16 98% 35.44 kg/m2        Blood Pressure from Last 3 Encounters:   05/22/17 104/54   05/15/17 110/56   05/08/17 116/56    Weight from Last 3 Encounters:   05/22/17 247 lb (112 kg)   05/15/17 243 lb 8 oz (110.5 kg)   05/08/17 244 lb (110.7 kg)              We Performed the Following     UA reflex to Microscopic and Culture          Today's Medication Changes          These changes are accurate as of: 5/22/17  4:49 PM.  If you have any questions, ask your nurse or doctor.               Start taking these medicines.        Dose/Directions    cephALEXin 500 MG capsule   Commonly known as:  KEFLEX   Used for:  BPH associated with nocturia, Nocturia, Dysuria   Started by:  Zack Hudson MD        Dose:  500 mg   Take 1 capsule (500 mg) by mouth 3 times daily   Quantity:  21 capsule   Refills:  0       cetirizine 10 MG tablet   Commonly known as:  zyrTEC   Used for:  Seasonal allergic rhinitis due to pollen   Started by:  Zack Hudson MD         Dose:  10 mg   Take 1 tablet (10 mg) by mouth every evening   Quantity:  30 tablet   Refills:  1       traMADol 50 MG tablet   Commonly known as:  ULTRAM   Used for:  Chronic midline low back pain without sciatica   Started by:  Zack Hudson MD        Dose:  50 mg   Take 1 tablet (50 mg) by mouth 2 times daily as needed for moderate pain   Quantity:  240 tablet   Refills:  0            Where to get your medicines      These medications were sent to [facility] [address] [facility]  [address] [address]     Phone:  [phone]     cephALEXin 500 MG capsule    cetirizine 10 MG tablet    finasteride 5 MG tablet    metFORMIN modified 1000 MG 24 hr tablet    senna-docusate 8.6-50 MG per tablet    tamsulosin 0.4 MG capsule         Some of these will need a paper prescription and others can be bought over the counter.  Ask your nurse if you have questions.     Bring a paper prescription for each of these medications     traMADol 50 MG tablet                Primary Care Provider Office Phone # Fax #    Zack Hudson [phone]       [facility] [address] [address] [facility] [address]        Thank you!     Thank you for choosing [facility]  for your care. Our goal is always to provide you with excellent care. Hearing back from our patients is one way we can continue to improve our services. Please take a few minutes to complete the written survey that you may receive in the mail after your visit with us. Thank you!             Your Updated Medication List - Protect others around you: Learn how to safely use, store and throw away your medicines at www.disposemymeds.org.          This list is accurate as of: 5/22/17  4:49 PM.  Always use your most recent med list.                   Brand Name Dispense Instructions for use    * acetaminophen 650 MG CR tablet    TYLENOL    240 tablet     Take 1 tablet (650 mg) by mouth 4 times daily       * acetaminophen 500 MG Caps     120 capsule    Take 1 tablet by mouth 4 times daily       albuterol 108 (90 BASE) MCG/ACT Inhaler    PROAIR HFA/PROVENTIL HFA/VENTOLIN HFA    1 Inhaler    Inhale 2 puffs into the lungs 4 times daily as needed       aspirin 81 MG EC tablet     90 tablet    Take 1 tablet (81 mg) by mouth daily       atorvastatin 20 MG tablet    LIPITOR    90 tablet    Take 1 tablet (20 mg) by mouth daily       B-12 1000 MCG Tbcr     90 tablet    Take 1,000 mcg by mouth daily       blood glucose monitoring lancets     3 Box    Use to test blood sugars  TWICE DAILY    or as directed.       blood glucose monitoring meter device kit    no brand specified    1 kit    Use to test blood sugar two times daily or as directed.       * blood glucose monitoring test strip    no brand specified    100 strip    1 strip by In Vitro route daily       * blood glucose monitoring test strip    no brand specified    100 each    Use to test blood sugars two times daily or as directed       * blood glucose monitoring test strip    no brand specified    400 strip    Use to test blood sugars 4 times daily or as directed  Contour       cephALEXin 500 MG capsule    KEFLEX    21 capsule    Take 1 capsule (500 mg) by mouth 3 times daily       cetirizine 10 MG tablet    zyrTEC    30 tablet    Take 1 tablet (10 mg) by mouth every evening       cholecalciferol 65995 UNITS capsule    VITAMIN D3    4 capsule    Take 1 capsule (50,000 Units) by mouth once a week       diclofenac 1 % Gel topical gel    VOLTAREN    100 g    Apply 4 grams to knees or 2 grams to hands four times daily using enclosed dosing card.       finasteride 5 MG tablet    PROSCAR    90 tablet    Take 1 tablet (5 mg) by mouth daily       fluticasone-salmeterol 100-50 MCG/DOSE diskus inhaler    ADVAIR    1 Inhaler    Inhale 1 puff into the lungs 2 times daily       gabapentin 300 MG capsule    NEURONTIN    90  capsule    Take 1 tablet (300 mg) every night for 1 days, then 1 tablet twice daily for 1  days, then 1 tablet three times daily       glipiZIDE 10 MG 24 hr tablet    GLUCOTROL XL    90 tablet    Take 1 tablet (10 mg) by mouth daily       ipratropium - albuterol 0.5 mg/2.5 mg/3 mL 0.5-2.5 (3) MG/3ML neb solution    DUONEB    2.5 mL    Take 1 vial (3 mLs) by nebulization every 6 hours as needed for shortness of breath / dyspnea or wheezing       lidocaine 5 % ointment    XYLOCAINE    300 g    One application 4 x daily to affected areas lower back and knees if necessary       losartan 25 MG tablet    COZAAR    90 tablet    Take 1 tablet (25 mg) by mouth daily       metFORMIN modified 1000 MG 24 hr tablet    GLUMETZA    30 tablet    Take 1 tablet (1,000 mg) by mouth daily (with dinner)       montelukast 10 MG tablet    SINGULAIR    90 tablet    Take 1 tablet (10 mg) by mouth At Bedtime       omeprazole 40 MG capsule    priLOSEC    91 capsule    Take 1 capsule (40 mg) by mouth daily Take 30-60 minutes before a meal.       * order for DME     1 each    Equipment being ordered: Nebulizer ONE USE Q 4 HOURS PRN EXACERBATION OF ASTHMA ML ALBUTEROL 2.5MG PER 3ML  (J45.20) Mild intermittent asthma without complication  (primary encounter diagnosis)  MAKI MORA JR., MD       * order for DME     400 each    Lancets.  Four times daily and prn. Fill per patient's insurance.   Microlet lancets       pioglitazone 15 MG tablet    ACTOS    90 tablet    Take 1 tablet (15 mg) by mouth daily       senna-docusate 8.6-50 MG per tablet    SENOKOT-S;PERICOLACE    120 tablet    Take 1 tablet by mouth 2 times daily       sitagliptin 50 MG tablet    JANUVIA    90 tablet    Take 1 tablet (50 mg) by mouth daily       tadalafil 5 MG tablet    CIALIS    90 tablet    Take 1 tablet (5 mg) by mouth daily Never use with nitroglycerin, terazosin or doxazosin.       tamsulosin 0.4 MG capsule    FLOMAX    90 capsule    Take 1 capsule (0.4 mg) by  mouth daily       traMADol 50 MG tablet    ULTRAM    240 tablet    Take 1 tablet (50 mg) by mouth 2 times daily as needed for moderate pain       triamcinolone 0.1 % paste    KENALOG    5 g    Take by mouth 2 times daily       valACYclovir 1000 mg tablet    VALTREX    21 tablet    Take 1 tablet (1,000 mg) by mouth 3 times daily       * Notice:  This list has 7 medication(s) that are the same as other medications prescribed for you. Read the directions carefully, and ask your doctor or other care provider to review them with you.

## 2017-05-22 NOTE — LETTER
[facility]  [address]  [address]  [place] [id number] [phone]                                                                                                           Suze Devlin  [address]   [facility] [address]    May 23, 2017      Dear Suez,    The results of your recent tests were reviewed and are enclosed.     Results for orders placed or performed in visit on 05/22/17   UA reflex to Microscopic and Culture   Result Value Ref Range    Color Urine Yellow     Appearance Urine Clear     Glucose Urine >=1000 (A) NEG mg/dL    Bilirubin Urine Negative NEG    Ketones Urine Negative NEG mg/dL    Specific Gravity Urine <=1.005 1.003 - 1.035    Blood Urine Negative NEG    pH Urine 5.5 5.0 - 7.0 pH    Protein Albumin Urine Negative NEG mg/dL    Urobilinogen Urine 0.2 0.2 - 1.0 EU/dL    Nitrite Urine Negative NEG    Leukocyte Esterase Urine Negative NEG    Source Midstream Urine          Thank you for choosing [facility].  We appreciate the opportunity to serve you and look forward to supporting your healthcare needs in the future.    If you have any questions or concerns, please call me or my staff at [phone].      Sincerely,    Zack Hudson Jr MD

## 2017-05-22 NOTE — NURSING NOTE
"Chief Complaint   Patient presents with     Asthma       Initial /54  Pulse 90  Temp 97.9  F (36.6  C) (Tympanic)  Resp 16  Wt 247 lb (112 kg)  SpO2 98%  BMI 35.44 kg/m2 Estimated body mass index is 35.44 kg/(m^2) as calculated from the following:    Height as of 5/15/17: 5' 10\" (1.778 m).    Weight as of this encounter: 247 lb (112 kg).  Medication Reconciliation: complete   Katina Marie CMA    "

## 2017-05-22 NOTE — PATIENT INSTRUCTIONS
(J30.1) Seasonal allergic rhinitis due to pollen  (primary encounter diagnosis)  Comment:    Plan: cetirizine (ZYRTEC) 10 MG tablet             (K59.01) Slow transit constipation  Comment:    Plan: senna-docusate (SENOKOT-S;PERICOLACE) 8.6-50 MG        per tablet, metFORMIN modified (GLUMETZA) 1000         MG 24 hr tablet             (R35.1) Nocturia  Comment:    Plan: tamsulosin (FLOMAX) 0.4 MG capsule, UA reflex         to Microscopic and Culture, cephALEXin (KEFLEX)        500 MG capsule             (N40.1,  R35.1) BPH associated with nocturia  Comment:    Plan: finasteride (PROSCAR) 5 MG tablet, cephALEXin         (KEFLEX) 500 MG capsule             (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, without long-term current use of insulin (H)  Comment:    Plan:      (M54.5,  G89.29) Chronic midline low back pain without sciatica  Comment:    Plan: traMADol (ULTRAM) 50 MG tablet, DISCONTINUED:         traMADol (ULTRAM) 50 MG tablet             (R30.0) Dysuria  Comment:    Plan: UA reflex to Microscopic and Culture,         cephALEXin (KEFLEX) 500 MG capsule

## 2017-05-22 NOTE — LETTER
[facility]  [address]  [address]  [facility] [place] [address] [phone]                                                                                                           Suze Devlin  [address]   [facility] [address]    May 22, 2017          Dear Suze Devlin,      [facility]   3.7   46 reviews   Gym in [place], [place]   [employer]irectionsWebsite   Address: [address], [place] [address]   Open today   5:30AM-11PM   Phone: [phone]   People also search for    Please allow price reduction   To help for overall health and Diabetes               Sincerely,    Zack Hudson Jr MD

## 2017-05-22 NOTE — PROGRESS NOTES
"  SUBJECTIVE:                                                    Suze Devlin is a 72 year old male who presents to clinic today for the following health issues:  Health Maintenance Due   Topic Date Due     AORTIC ANEURYSM SCREENING (SYSTEM ASSIGNED)  01/01/2009     PNEUMOCOCCAL (2 of 2 - PCV13) 10/17/2015     EYE EXAM Q1 YEAR( NO INBASKET)  09/14/2016     FOOT EXAM Q1 YEAR( NO INBASKET)  04/15/2017     Health Maintenance reviewed at today's visit patient asked to schedule/complete:   None, Health Maintenance up to date.      Asthma Follow-Up    Was ACT completed today?    Yes    ACT Total Scores 5/22/2017   ACT TOTAL SCORE (Goal Greater than or Equal to 20) 12   In the past 12 months, how many times did you visit the emergency room for your asthma without being admitted to the hospital? 0   In the past 12 months, how many times were you hospitalized overnight because of your asthma? 0       Recent asthma triggers that patient is dealing with: unsure        Amount of exercise or physical activity: 4-5 days/week for an average of 15-30 minutes    Problems taking medications regularly: No    Medication side effects: none    Diet: regular (no restrictions)    ASTHMA FOLLOW UP  NOT USING PREVENTATIVE MEDICATION PROPERLY     ONCE DAILY ADVAIR INSTEAD OF TWICE DAILY     AND DELAY IN USING IT      USING RESCUE  INHALER PROPERLY         DYSURIA CAUSE UNCERTAIN     NORMAL URINE ANALYSIS EXCEPT GLUCOSE NOTED     HISTORY OF ELEVATED PSA     INSOMNIA     VITAMIN D REPLACEMENT     GASTROESOPHAGEAL REFLUX DISEASE WITHOUT ESOPHAGITIS ON PROTON PUMP INHIBITOR     OSTEOARTHRITIS KNEES     CONSTIPATION AND BLOATING WORSE ON MIRALAX     DIABETES 2 GOAL 8% CONTROLLED     LDL OR \"BAD\" CHOLESTEROL  GOAL < 100 MEDITERRANEAN DIET AND DIABETES DIET               Diabetes Follow-up      Patient is checking blood sugars:  DAILY PRN     Diabetic concerns: None     Symptoms of hypoglycemia (low blood sugar): none     Paresthesias " (numbness or burning in feet) or sores: No     Date of last diabetic eye exam:  DIABETES 2 GOAL 8%       Hyperlipidemia Follow-Up      Rate your low fat/cholesterol diet?: good    Taking statin?  Yes, no muscle aches from statin    Other lipid medications/supplements?:  none     Hypertension Follow-up      Outpatient blood pressures are not being checked.    Low Salt Diet: no added salt         Problem list and histories reviewed & adjusted, as indicated.  Additional history: as documented      Patient Active Problem List   Diagnosis     Health Care Home     Cataract     Elevated prostate specific antigen (PSA)     Psychophysiological insomnia     Vitamin D deficiency     Hypertrophy of prostate without urinary obstruction     Memory loss     Anxiety state     Gastroesophageal reflux disease without esophagitis     Primary localized osteoarthrosis, lower leg     Vitamin D insufficiency     Chronic idiopathic constipation     Vitamin B 12 deficiency     Hypertension goal BP (blood pressure) < 140/80     Chronic fatigue     Hyperlipidemia LDL goal <100     GERD (gastroesophageal reflux disease)     Asthma, mild intermittent     Diabetic neuropathy (H)     Lumbago     Controlled type 2 diabetes mellitus with microalbuminuria or microproteinuria     ACP (advance care planning)     Type 2 diabetes mellitus with diabetic nephropathy, without long-term current use of insulin (H)     Mild intermittent asthma without complication     Past Surgical History:   Procedure Laterality Date     HERNIA REPAIR, INGUINAL RT/LT         Social History   Substance Use Topics     Smoking status: Never Smoker     Smokeless tobacco: Never Used     Alcohol use No     Family History   Problem Relation Age of Onset     Unknown/Adopted Mother      medical history of family is unknown.         Current Outpatient Prescriptions   Medication Sig Dispense Refill     senna-docusate (SENOKOT-S;PERICOLACE) 8.6-50 MG per tablet Take 1 tablet by mouth 2  times daily 120 tablet 11     tamsulosin (FLOMAX) 0.4 MG capsule Take 1 capsule (0.4 mg) by mouth daily 90 capsule 3     metFORMIN modified (GLUMETZA) 1000 MG 24 hr tablet Take 1 tablet (1,000 mg) by mouth daily (with dinner) 30 tablet 11     finasteride (PROSCAR) 5 MG tablet Take 1 tablet (5 mg) by mouth daily 90 tablet 3     cetirizine (ZYRTEC) 10 MG tablet Take 1 tablet (10 mg) by mouth every evening 30 tablet 1     traMADol (ULTRAM) 50 MG tablet Take 1 tablet (50 mg) by mouth 2 times daily as needed for moderate pain 240 tablet 0     cephALEXin (KEFLEX) 500 MG capsule Take 1 capsule (500 mg) by mouth 3 times daily 21 capsule 0     fluticasone-salmeterol (ADVAIR) 100-50 MCG/DOSE diskus inhaler Inhale 1 puff into the lungs 2 times daily 1 Inhaler 3     albuterol (PROAIR HFA/PROVENTIL HFA/VENTOLIN HFA) 108 (90 BASE) MCG/ACT Inhaler Inhale 2 puffs into the lungs 4 times daily as needed 1 Inhaler 11     valACYclovir (VALTREX) 1000 mg tablet Take 1 tablet (1,000 mg) by mouth 3 times daily 21 tablet 0     gabapentin (NEURONTIN) 300 MG capsule Take 1 tablet (300 mg) every night for 1 days, then 1 tablet twice daily for 1  days, then 1 tablet three times daily 90 capsule 3     atorvastatin (LIPITOR) 20 MG tablet Take 1 tablet (20 mg) by mouth daily 90 tablet 1     losartan (COZAAR) 25 MG tablet Take 1 tablet (25 mg) by mouth daily 90 tablet 1     aspirin 81 MG EC tablet Take 1 tablet (81 mg) by mouth daily 90 tablet 3     cholecalciferol (VITAMIN D3) 86802 UNITS capsule Take 1 capsule (50,000 Units) by mouth once a week 4 capsule 11     Cyanocobalamin (B-12) 1000 MCG TBCR Take 1,000 mcg by mouth daily 90 tablet 2     blood glucose monitoring (NO BRAND SPECIFIED) test strip Use to test blood sugars 4 times daily or as directed    Contour 400 strip 1     order for DME Lancets.  Four times daily and prn. Fill per patient's insurance.      Microlet lancets 400 each 1     sitagliptin (JANUVIA) 50 MG tablet Take 1 tablet (50  mg) by mouth daily 90 tablet 1     pioglitazone (ACTOS) 15 MG tablet Take 1 tablet (15 mg) by mouth daily 90 tablet 1     glipiZIDE (GLUCOTROL XL) 10 MG 24 hr tablet Take 1 tablet (10 mg) by mouth daily 90 tablet 1     montelukast (SINGULAIR) 10 MG tablet Take 1 tablet (10 mg) by mouth At Bedtime 90 tablet 3     omeprazole (PRILOSEC) 40 MG capsule Take 1 capsule (40 mg) by mouth daily Take 30-60 minutes before a meal. 91 capsule 2     order for DME Equipment being ordered: Nebulizer  ONE  USE Q 4 HOURS PRN EXACERBATION OF ASTHMA ML  ALBUTEROL 2.5MG PER 3ML   (J45.20) Mild intermittent asthma without complication  (primary encounter diagnosis)    MAKI MORA JR., MD 1 each 1     ipratropium - albuterol 0.5 mg/2.5 mg/3 mL (DUONEB) 0.5-2.5 (3) MG/3ML nebulization Take 1 vial (3 mLs) by nebulization every 6 hours as needed for shortness of breath / dyspnea or wheezing 2.5 mL o     blood glucose monitoring (NO BRAND SPECIFIED) meter device kit Use to test blood sugar two times daily or as directed. 1 kit 0     blood glucose monitoring (NO BRAND SPECIFIED) test strip Use to test blood sugars two times daily or as directed 100 each 4     diclofenac (VOLTAREN) 1 % GEL Apply 4 grams to knees or 2 grams to hands four times daily using enclosed dosing card. 100 g 1     blood glucose monitoring (ONE TOUCH DELICA) lancets Use to test blood sugars  TWICE DAILY    or as directed. 3 Box prn     blood glucose test strip 1 strip by In Vitro route daily 100 strip 3     acetaminophen 500 MG CAPS Take 1 tablet by mouth 4 times daily 120 capsule 11     [DISCONTINUED] metFORMIN modified (GLUMETZA) 1000 MG 24 hr tablet Take 1 tablet (1,000 mg) by mouth daily (with dinner) 30 tablet 11     lidocaine (XYLOCAINE) 5 % ointment One application 4 x daily to affected areas lower back and knees if necessary (Patient not taking: Reported on 5/22/2017) 300 g 0     tadalafil (CIALIS) 5 MG tablet Take 1 tablet (5 mg) by mouth daily Never use  with nitroglycerin, terazosin or doxazosin. (Patient not taking: Reported on 5/22/2017) 90 tablet 0     acetaminophen (TYLENOL) 650 MG CR tablet Take 1 tablet (650 mg) by mouth 4 times daily 240 tablet 0     triamcinolone (KENALOG) 0.1 % paste Take by mouth 2 times daily (Patient not taking: Reported on 5/22/2017) 5 g 11     No Known Allergies  Recent Labs   Lab Test  05/15/17   1209  10/31/16   1459  03/11/16   1000   03/11/15   1054  10/03/14   1114  03/14/14   1137   A1C  8.0*  6.7*  9.7*   < >  7.9*  6.3*  6.9*   LDL   --   85  135*   --   78  94  126   HDL   --   47  55   --   49  53  44   TRIG   --   146  122   --   134  93  149   ALT   --   28   --    --   19  23  18   CR   --   1.04  1.00   < >  1.00  1.10   --    GFRESTIMATED   --   70  73   < >  74  66   --    GFRESTBLACK   --   85  89   < >  89  80   --    POTASSIUM   --   4.4  5.3   < >  4.2  4.3   --    TSH   --    --   4.36   --    --    --   5.59*    < > = values in this interval not displayed.      BP Readings from Last 3 Encounters:   05/22/17 104/54   05/15/17 110/56   05/08/17 116/56    Wt Readings from Last 3 Encounters:   05/22/17 247 lb (112 kg)   05/15/17 243 lb 8 oz (110.5 kg)   05/08/17 244 lb (110.7 kg)                  Labs reviewed in EPIC    Reviewed and updated as needed this visit by clinical staff       Reviewed and updated as needed this visit by Provider         ROS: has Health Care Home; Cataract; Elevated prostate specific antigen (PSA); Psychophysiological insomnia; Vitamin D deficiency; Hypertrophy of prostate without urinary obstruction; Memory loss; Anxiety state; Gastroesophageal reflux disease without esophagitis; Primary localized osteoarthrosis, lower leg; Vitamin D insufficiency; Chronic idiopathic constipation; Vitamin B 12 deficiency; Hypertension goal BP (blood pressure) < 140/80; Chronic fatigue; Hyperlipidemia LDL goal <100; GERD (gastroesophageal reflux disease); Asthma, mild intermittent; Diabetic neuropathy (H);  Lumbago; Controlled type 2 diabetes mellitus with microalbuminuria or microproteinuria; ACP (advance care planning); Type 2 diabetes mellitus with diabetic nephropathy, without long-term current use of insulin (H); and Mild intermittent asthma without complication on his problem list.    C: NEGATIVE for fever, chills, change in weight  I: NEGATIVE for worrisome rashes, moles or lesions  E: NEGATIVE for vision changes or irritation  E/M: NEGATIVE for ear, mouth and throat problems  RESP:NEGATIVE for significant cough or SOB, cough-productive, Hx asthma, SOB/dyspnea and wheezing  CV: NEGATIVE for chest pain, palpitations or peripheral edema  GI: NEGATIVE for nausea, abdominal pain, heartburn, or change in bowel habits   male :dysuria and erectile dysfunction  M: NEGATIVE for significant arthralgias or myalgia  N: NEGATIVE for weakness, dizziness or paresthesias  E: NEGATIVE for temperature intolerance, skin/hair changes  H: NEGATIVE for bleeding problems  P: NEGATIVE for changes in mood or affect    OBJECTIVE:                                                    /54  Pulse 90  Temp 97.9  F (36.6  C) (Tympanic)  Resp 16  Wt 247 lb (112 kg)  SpO2 98%  BMI 35.44 kg/m2  Body mass index is 35.44 kg/(m^2).  GENERAL: healthy, alert and no distress  EYES: Eyes grossly normal to inspection, PERRL and conjunctivae and sclerae normal  HENT: ear canals and TM's normal, nose and mouth without ulcers or lesions  NECK: no adenopathy, no asymmetry, masses, or scars and thyroid normal to palpation  RESP: lungs clear to auscultation - no rales, rhonchi or wheezes  CV: regular rate and rhythm, normal S1 S2, no S3 or S4, no murmur, click or rub, no peripheral edema and peripheral pulses strong  ABDOMEN: soft, nontender, no hepatosplenomegaly, no masses and bowel sounds normal  MS: no gross musculoskeletal defects noted, no edema  PSYCH: mentation appears normal, affect normal/bright    Diagnostic Test Results:  Results for  orders placed or performed in visit on 05/22/17   UA reflex to Microscopic and Culture   Result Value Ref Range    Color Urine Yellow     Appearance Urine Clear     Glucose Urine >=1000 (A) NEG mg/dL    Bilirubin Urine Negative NEG    Ketones Urine Negative NEG mg/dL    Specific Gravity Urine <=1.005 1.003 - 1.035    Blood Urine Negative NEG    pH Urine 5.5 5.0 - 7.0 pH    Protein Albumin Urine Negative NEG mg/dL    Urobilinogen Urine 0.2 0.2 - 1.0 EU/dL    Nitrite Urine Negative NEG    Leukocyte Esterase Urine Negative NEG    Source Midstream Urine         ASSESSMENT/PLAN:                                                          ICD-10-CM    1. Seasonal allergic rhinitis due to pollen J30.1 cetirizine (ZYRTEC) 10 MG tablet   2. Slow transit constipation K59.01 senna-docusate (SENOKOT-S;PERICOLACE) 8.6-50 MG per tablet     metFORMIN modified (GLUMETZA) 1000 MG 24 hr tablet   3. Nocturia R35.1 tamsulosin (FLOMAX) 0.4 MG capsule     UA reflex to Microscopic and Culture     cephALEXin (KEFLEX) 500 MG capsule   4. BPH associated with nocturia N40.1 finasteride (PROSCAR) 5 MG tablet    R35.1 cephALEXin (KEFLEX) 500 MG capsule   5. Type 2 diabetes mellitus with diabetic nephropathy, without long-term current use of insulin (H) E11.21    6. Chronic midline low back pain without sciatica M54.5 traMADol (ULTRAM) 50 MG tablet    G89.29 DISCONTINUED: traMADol (ULTRAM) 50 MG tablet   7. Dysuria R30.0 UA reflex to Microscopic and Culture     cephALEXin (KEFLEX) 500 MG capsule       Patient Instructions   (J30.1) Seasonal allergic rhinitis due to pollen  (primary encounter diagnosis)  Comment:    Plan: cetirizine (ZYRTEC) 10 MG tablet             (K59.01) Slow transit constipation  Comment:    Plan: senna-docusate (SENOKOT-S;PERICOLACE) 8.6-50 MG        per tablet, metFORMIN modified (GLUMETZA) 1000         MG 24 hr tablet             (R35.1) Nocturia  Comment:    Plan: tamsulosin (FLOMAX) 0.4 MG capsule, UA reflex         to  Microscopic and Culture, cephALEXin (KEFLEX)        500 MG capsule             (N40.1,  R35.1) BPH associated with nocturia  Comment:    Plan: finasteride (PROSCAR) 5 MG tablet, cephALEXin         (KEFLEX) 500 MG capsule             (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, without long-term current use of insulin (H)  Comment:    Plan:      (M54.5,  G89.29) Chronic midline low back pain without sciatica  Comment:    Plan: traMADol (ULTRAM) 50 MG tablet, DISCONTINUED:         traMADol (ULTRAM) 50 MG tablet             (R30.0) Dysuria  Comment:    Plan: UA reflex to Microscopic and Culture,         cephALEXin (KEFLEX) 500 MG capsule                     MAKI MORA MD  [facility]

## 2017-05-23 ASSESSMENT — ASTHMA QUESTIONNAIRES: ACT_TOTALSCORE: 12

## 2017-05-24 ENCOUNTER — TELEPHONE (OUTPATIENT)
Dept: FAMILY MEDICINE | Facility: CLINIC | Age: 72
End: 2017-05-24

## 2017-05-24 NOTE — TELEPHONE ENCOUNTER
Reason for Call:  Form, our goal is to have forms completed with 72 hours, however, some forms may require a visit or additional information.    Type of letter, form or note:      Who is the form from?: [facility] (if other please explain)    Where did the form come from: form was faxed in    What clinic location was the form placed at?: [facility]    Where the form was placed: Dr's Box: Doroteo Hudson MD    What number is listed as a contact on the form?: [phone]       Additional comments:     Call taken on 5/24/2017 at 11:48 AM by Irma Quiroz

## 2017-05-26 ENCOUNTER — TELEPHONE (OUTPATIENT)
Dept: NURSING | Facility: CLINIC | Age: 72
End: 2017-05-26

## 2017-05-26 NOTE — TELEPHONE ENCOUNTER
Home care nurse calling to report that patient refuses to take his medications zyrtec and singular. Will take them off of his medication list.

## 2017-06-02 DIAGNOSIS — Z53.9 DIAGNOSIS NOT YET DEFINED: Primary | ICD-10-CM

## 2017-06-02 PROCEDURE — G0179 MD RECERTIFICATION HHA PT: HCPCS | Performed by: FAMILY MEDICINE

## 2017-06-10 DIAGNOSIS — K21.9 GASTROESOPHAGEAL REFLUX DISEASE WITHOUT ESOPHAGITIS: ICD-10-CM

## 2017-06-10 DIAGNOSIS — R14.2 FLATULENCE, ERUCTATION AND GAS PAIN: ICD-10-CM

## 2017-06-10 DIAGNOSIS — R14.3 FLATULENCE, ERUCTATION AND GAS PAIN: ICD-10-CM

## 2017-06-10 DIAGNOSIS — R14.1 FLATULENCE, ERUCTATION AND GAS PAIN: ICD-10-CM

## 2017-06-10 NOTE — TELEPHONE ENCOUNTER
omeprazole (PRILOSEC) 40 MG capsule      Last Written Prescription Date: 8/5/16  Last Fill Quantity: 91,  # refills: 2   Last Office Visit with [facility], [facility] or [facility] prescribing provider: 5/22/17

## 2017-06-12 RX ORDER — OMEPRAZOLE 40 MG/1
40 CAPSULE, DELAYED RELEASE ORAL DAILY
Qty: 91 CAPSULE | Refills: 3 | Status: SHIPPED | OUTPATIENT
Start: 2017-06-12 | End: 2018-06-01

## 2017-06-23 DIAGNOSIS — E08.49 OTHER DIABETIC NEUROLOGICAL COMPLICATION ASSOCIATED WITH DIABETES MELLITUS DUE TO UNDERLYING CONDITION (H): ICD-10-CM

## 2017-06-23 DIAGNOSIS — E11.21 CONTROLLED TYPE 2 DIABETES MELLITUS WITH MICROALBUMINURIC DIABETIC NEPHROPATHY (H): ICD-10-CM

## 2017-06-23 NOTE — TELEPHONE ENCOUNTER
pioglitazone (ACTOS) 15 MG tablet         Last Written Prescription Date: 12/16/16  Last Fill Quantity: 90, # refills: 1  Last Office Visit with G, P or [facility] prescribing provider:  5/22/17        BP Readings from Last 3 Encounters:   05/22/17 104/54   05/15/17 110/56   05/08/17 116/56     Lab Results   Component Value Date    MICROL 19 10/31/2016     Lab Results   Component Value Date    UMALCR 9.95 10/31/2016     Creatinine   Date Value Ref Range Status   10/31/2016 1.04 0.66 - 1.25 mg/dL Final   ]  GFR Estimate   Date Value Ref Range Status   10/31/2016 70 >60 mL/min/1.7m2 Final   03/11/2016 73 >60 mL/min/1.7m2 Final   10/13/2015 66 >60 mL/min/1.7m2 Final     GFR Estimate If Black   Date Value Ref Range Status   10/31/2016 85 >60 mL/min/1.7m2 Final   03/11/2016 89 >60 mL/min/1.7m2 Final   10/13/2015 80 >60 mL/min/1.7m2 Final     Lab Results   Component Value Date    CHOL 161 10/31/2016     Lab Results   Component Value Date    HDL 47 10/31/2016     Lab Results   Component Value Date    LDL 85 10/31/2016     Lab Results   Component Value Date    TRIG 146 10/31/2016     Lab Results   Component Value Date    CHOLHDLRATIO 3.1 03/11/2015     Lab Results   Component Value Date    AST 25 11/02/2012     Lab Results   Component Value Date    ALT 28 10/31/2016     Lab Results   Component Value Date    A1C 8.0 05/15/2017    A1C 6.7 10/31/2016    A1C 9.7 03/11/2016    A1C 7.1 10/13/2015    A1C 7.9 03/11/2015     Potassium   Date Value Ref Range Status   10/31/2016 4.4 3.4 - 5.3 mmol/L Final

## 2017-06-26 ENCOUNTER — TRANSFERRED RECORDS (OUTPATIENT)
Dept: HEALTH INFORMATION MANAGEMENT | Facility: CLINIC | Age: 72
End: 2017-06-26

## 2017-06-26 RX ORDER — PIOGLITAZONEHYDROCHLORIDE 15 MG/1
15 TABLET ORAL DAILY
Qty: 90 TABLET | Refills: 1 | Status: SHIPPED | OUTPATIENT
Start: 2017-06-26 | End: 2017-12-09

## 2017-07-06 ENCOUNTER — TELEPHONE (OUTPATIENT)
Dept: FAMILY MEDICINE | Facility: CLINIC | Age: 72
End: 2017-07-06

## 2017-07-06 NOTE — TELEPHONE ENCOUNTER
Verbal okay given for the following orders:      2 x month x 2 months and 3 prns for skilled nurse assess,  med set up and compliance,  teaching disease management, safety assessment     Michelle Silverio RN  07/06/17  10:13 AM

## 2017-07-06 NOTE — TELEPHONE ENCOUNTER
PETE-  [facility] nurse called requesting providers approval to delay re certification 1 day due to need for .  Order approval given.

## 2017-07-19 DIAGNOSIS — E11.21 TYPE 2 DIABETES MELLITUS WITH DIABETIC NEPHROPATHY, WITHOUT LONG-TERM CURRENT USE OF INSULIN (H): Chronic | ICD-10-CM

## 2017-07-19 NOTE — TELEPHONE ENCOUNTER
glipiZIDE (GLUCOTROL XL) 10 MG 24 hr tablet         Last Written Prescription Date: 11/16/16  Last Fill Quantity: 90, # refills: 1  Last Office Visit with G, P or [facility] prescribing provider:  5/22/17        BP Readings from Last 3 Encounters:   05/22/17 104/54   05/15/17 110/56   05/08/17 116/56     Lab Results   Component Value Date    MICROL 19 10/31/2016     Lab Results   Component Value Date    UMALCR 9.95 10/31/2016     Creatinine   Date Value Ref Range Status   10/31/2016 1.04 0.66 - 1.25 mg/dL Final   ]  GFR Estimate   Date Value Ref Range Status   10/31/2016 70 >60 mL/min/1.7m2 Final   03/11/2016 73 >60 mL/min/1.7m2 Final   10/13/2015 66 >60 mL/min/1.7m2 Final     GFR Estimate If Black   Date Value Ref Range Status   10/31/2016 85 >60 mL/min/1.7m2 Final   03/11/2016 89 >60 mL/min/1.7m2 Final   10/13/2015 80 >60 mL/min/1.7m2 Final     Lab Results   Component Value Date    CHOL 161 10/31/2016     Lab Results   Component Value Date    HDL 47 10/31/2016     Lab Results   Component Value Date    LDL 85 10/31/2016     Lab Results   Component Value Date    TRIG 146 10/31/2016     Lab Results   Component Value Date    CHOLHDLRATIO 3.1 03/11/2015     Lab Results   Component Value Date    AST 25 11/02/2012     Lab Results   Component Value Date    ALT 28 10/31/2016     Lab Results   Component Value Date    A1C 8.0 05/15/2017    A1C 6.7 10/31/2016    A1C 9.7 03/11/2016    A1C 7.1 10/13/2015    A1C 7.9 03/11/2015     Potassium   Date Value Ref Range Status   10/31/2016 4.4 3.4 - 5.3 mmol/L Final

## 2017-07-20 RX ORDER — GLIPIZIDE 10 MG/1
10 TABLET, FILM COATED, EXTENDED RELEASE ORAL DAILY
Qty: 90 TABLET | Refills: 1 | Status: SHIPPED | OUTPATIENT
Start: 2017-07-20 | End: 2018-03-06

## 2017-08-02 DIAGNOSIS — Z53.9 DIAGNOSIS NOT YET DEFINED: Primary | ICD-10-CM

## 2017-08-02 PROCEDURE — G0179 MD RECERTIFICATION HHA PT: HCPCS | Performed by: FAMILY MEDICINE

## 2017-08-07 ENCOUNTER — TELEPHONE (OUTPATIENT)
Dept: FAMILY MEDICINE | Facility: CLINIC | Age: 72
End: 2017-08-07

## 2017-08-07 NOTE — TELEPHONE ENCOUNTER
Our goal is to have forms completed within 72 hours, however some forms may require a visit or additional information.    What clinic location was the form placed at [facility] or [place]     Who is the form from? [facility]  Where did the form come from? Faxed to clinic   The form was placed in the inbox of Zack Hudson Jr MD      Please fax to [phone]    Additional comments: Cert period 7/6/17 - 9/3/17    Call take on 8/7/2017 at 12:05 PM by Grace Sherman

## 2017-08-08 ENCOUNTER — TELEPHONE (OUTPATIENT)
Dept: FAMILY MEDICINE | Facility: CLINIC | Age: 72
End: 2017-08-08

## 2017-08-08 ENCOUNTER — CARE COORDINATION (OUTPATIENT)
Dept: GERIATRIC MEDICINE | Facility: CLINIC | Age: 72
End: 2017-08-08

## 2017-08-08 NOTE — PROGRESS NOTES
Member called to request change in homemaking provider to [place] [employer].  Requested CMS to update homemaking auth.  Digna Chen RN MARCELLE  [facility]   Phone:   [phone]  Fax:       [phone]

## 2017-08-08 NOTE — TELEPHONE ENCOUNTER
Our goal is to have forms completed within 72 hours, however some forms may require a visit or additional information.    What clinic location was the form placed at [facility] or [place]     Who is the form from? [facility]  Where did the form come from? Faxed to clinic   The form was placed in the inbox of Zack Hudson Jr MD      Please fax to [phone]    Additional comments: 7/6/17    Call take on 8/8/2017 at 10:22 AM by Grace Sherman

## 2017-08-09 DIAGNOSIS — E11.21 TYPE 2 DIABETES MELLITUS WITH DIABETIC NEPHROPATHY, WITHOUT LONG-TERM CURRENT USE OF INSULIN (H): Chronic | ICD-10-CM

## 2017-08-09 NOTE — TELEPHONE ENCOUNTER
sitagliptin (JANUVIA) 50 MG tablet         Last Written Prescription Date: 1/5/17  Last Fill Quantity: 90, # refills: 1  Last Office Visit with G, P or [facility] prescribing provider:  5/22/17        BP Readings from Last 3 Encounters:   05/22/17 104/54   05/15/17 110/56   05/08/17 116/56     Lab Results   Component Value Date    MICROL 19 10/31/2016     Lab Results   Component Value Date    UMALCR 9.95 10/31/2016     Creatinine   Date Value Ref Range Status   10/31/2016 1.04 0.66 - 1.25 mg/dL Final   ]  GFR Estimate   Date Value Ref Range Status   10/31/2016 70 >60 mL/min/1.7m2 Final   03/11/2016 73 >60 mL/min/1.7m2 Final   10/13/2015 66 >60 mL/min/1.7m2 Final     GFR Estimate If Black   Date Value Ref Range Status   10/31/2016 85 >60 mL/min/1.7m2 Final   03/11/2016 89 >60 mL/min/1.7m2 Final   10/13/2015 80 >60 mL/min/1.7m2 Final     Lab Results   Component Value Date    CHOL 161 10/31/2016     Lab Results   Component Value Date    HDL 47 10/31/2016     Lab Results   Component Value Date    LDL 85 10/31/2016     Lab Results   Component Value Date    TRIG 146 10/31/2016     Lab Results   Component Value Date    CHOLHDLRATIO 3.1 03/11/2015     Lab Results   Component Value Date    AST 25 11/02/2012     Lab Results   Component Value Date    ALT 28 10/31/2016     Lab Results   Component Value Date    A1C 8.0 05/15/2017    A1C 6.7 10/31/2016    A1C 9.7 03/11/2016    A1C 7.1 10/13/2015    A1C 7.9 03/11/2015     Potassium   Date Value Ref Range Status   10/31/2016 4.4 3.4 - 5.3 mmol/L Final

## 2017-08-10 ENCOUNTER — CARE COORDINATION (OUTPATIENT)
Dept: GERIATRIC MEDICINE | Facility: CLINIC | Age: 72
End: 2017-08-10

## 2017-08-10 NOTE — PROGRESS NOTES
Placed a call to member and schedule a HV for next Wednesday, 08/16/17 AM.  Digna Chen RN N  [facility]   Phone:   [phone]  Fax:       [phone]

## 2017-08-16 ENCOUNTER — CARE COORDINATION (OUTPATIENT)
Dept: GERIATRIC MEDICINE | Facility: CLINIC | Age: 72
End: 2017-08-16

## 2017-08-16 ASSESSMENT — PATIENT HEALTH QUESTIONNAIRE - PHQ9: SUM OF ALL RESPONSES TO PHQ QUESTIONS 1-9: 7

## 2017-08-16 NOTE — PROGRESS NOTES
Home visit/Todd Risk Assessment/EW screening completed on:  08/16/2017.  Member is a 72 year old male who resides alone in a one bedroom apartment. Member reports history of chronic asthma, hypertension, diabetes, chronic fatigue, general pain in lower extremities. Member also reports history of dizziness especially at night.  Member reports that his children live in [place] with daughter close by and has strong family knit. Last office visit was with PCP was on 05/22/17.  No recent ED visit or hospitalization reported.  Member currently receiving the following services:  PCA, homemaking, ADC with ADC transportation as well as SNV to assist with medication.  See EMR for a list of client's diagnoses and medications.   Medication management:   Medications reviewed:Yes.   Medication management: RN med set up.   Medication understanding:Do you have questions about your medications? No. MTM offered.  Member Mood/behavior-PHQ9 score:    PHQ-9 score:    PHQ-9 SCORE 8/16/2017   Total Score -   Total Score 7     Any needs to f/u with PCP on PHQ9 score:  N/A.   Plan of Care: This CM recommends that member continues current POC including PCA, homemaking, ADC with ADC transportation as well as SNV to assist with medication.  Follow-Up Plan: Member informed of future contact, plan to f/u with member with a 6 month telephone assessment.  Contact information shared with member and family, encouraged member to call with any questions or concerns prior to this.    See [facility] for further detailed information  SEAMUS Navas  [facility]   Phone:   [phone]  Fax:       [phone]

## 2017-09-05 ENCOUNTER — TELEPHONE (OUTPATIENT)
Dept: FAMILY MEDICINE | Facility: CLINIC | Age: 72
End: 2017-09-05

## 2017-09-05 NOTE — TELEPHONE ENCOUNTER
Verbal ok given on behalf of Dr. Zack Hudson to continue with homje care for 9weeks, QOweek with 3 prns

## 2017-09-06 ENCOUNTER — CARE COORDINATION (OUTPATIENT)
Dept: GERIATRIC MEDICINE | Facility: CLINIC | Age: 72
End: 2017-09-06

## 2017-09-06 NOTE — PROGRESS NOTES
Member called and requested to change ADC to [facility].  Provided verbal auth to Sanju, from [facility].  Updated CPS and POC.  Digna Chen RN PHN  [facility]   Phone:   [phone]  Fax:       [phone]

## 2017-09-07 ENCOUNTER — TELEPHONE (OUTPATIENT)
Dept: FAMILY MEDICINE | Facility: CLINIC | Age: 72
End: 2017-09-07

## 2017-09-07 DIAGNOSIS — Z53.9 DIAGNOSIS NOT YET DEFINED: Primary | ICD-10-CM

## 2017-09-07 NOTE — TELEPHONE ENCOUNTER
Our goal is to have forms completed within 72 hours, however some forms may require a visit or additional information.    What clinic location was the form placed at [facility] or [place].?     Who is the form from? [facility]  Where did the form come from? Faxed to clinic   The form was placed in the inbox of Zack Hudson Jr MD      Please fax to [phone]    Additional comments: Cert Period 9/4/17 - 11/2/17    Call take on 9/7/2017 at 2:40 PM by Grace Sherman

## 2017-09-08 PROCEDURE — G0179 MD RECERTIFICATION HHA PT: HCPCS | Performed by: FAMILY MEDICINE

## 2017-09-12 ENCOUNTER — RADIANT APPOINTMENT (OUTPATIENT)
Dept: GENERAL RADIOLOGY | Facility: CLINIC | Age: 72
End: 2017-09-12
Attending: FAMILY MEDICINE
Payer: COMMERCIAL

## 2017-09-12 ENCOUNTER — OFFICE VISIT (OUTPATIENT)
Dept: FAMILY MEDICINE | Facility: CLINIC | Age: 72
End: 2017-09-12
Payer: COMMERCIAL

## 2017-09-12 VITALS
OXYGEN SATURATION: 98 % | RESPIRATION RATE: 16 BRPM | HEART RATE: 82 BPM | BODY MASS INDEX: 34.87 KG/M2 | SYSTOLIC BLOOD PRESSURE: 128 MMHG | TEMPERATURE: 97.1 F | WEIGHT: 243 LBS | DIASTOLIC BLOOD PRESSURE: 68 MMHG

## 2017-09-12 DIAGNOSIS — K08.409 HISTORY OF TOOTH EXTRACTION, UNSPECIFIED EDENTULISM CLASS: ICD-10-CM

## 2017-09-12 DIAGNOSIS — Z11.1 SCREENING EXAMINATION FOR PULMONARY TUBERCULOSIS: ICD-10-CM

## 2017-09-12 DIAGNOSIS — E78.5 HYPERLIPIDEMIA LDL GOAL <100: Chronic | ICD-10-CM

## 2017-09-12 DIAGNOSIS — E55.9 VITAMIN D INSUFFICIENCY: ICD-10-CM

## 2017-09-12 DIAGNOSIS — K21.9 GASTROESOPHAGEAL REFLUX DISEASE WITHOUT ESOPHAGITIS: Chronic | ICD-10-CM

## 2017-09-12 DIAGNOSIS — E11.9 TYPE 2 DIABETES MELLITUS WITHOUT COMPLICATION, WITHOUT LONG-TERM CURRENT USE OF INSULIN (H): Chronic | ICD-10-CM

## 2017-09-12 DIAGNOSIS — Z12.11 SPECIAL SCREENING FOR MALIGNANT NEOPLASMS, COLON: ICD-10-CM

## 2017-09-12 DIAGNOSIS — R97.20 ELEVATED PROSTATE SPECIFIC ANTIGEN (PSA): ICD-10-CM

## 2017-09-12 DIAGNOSIS — I10 HYPERTENSION GOAL BP (BLOOD PRESSURE) < 140/80: Chronic | ICD-10-CM

## 2017-09-12 DIAGNOSIS — J45.20 MILD INTERMITTENT ASTHMA WITHOUT COMPLICATION: Chronic | ICD-10-CM

## 2017-09-12 DIAGNOSIS — R07.0 THROAT PAIN: ICD-10-CM

## 2017-09-12 DIAGNOSIS — Z12.5 SPECIAL SCREENING FOR MALIGNANT NEOPLASM OF PROSTATE: ICD-10-CM

## 2017-09-12 DIAGNOSIS — F51.04 PSYCHOPHYSIOLOGICAL INSOMNIA: Chronic | ICD-10-CM

## 2017-09-12 DIAGNOSIS — E53.8 VITAMIN B 12 DEFICIENCY: ICD-10-CM

## 2017-09-12 DIAGNOSIS — E04.9 THYROID ENLARGEMENT: Primary | ICD-10-CM

## 2017-09-12 DIAGNOSIS — N40.0 HYPERTROPHY OF PROSTATE WITHOUT URINARY OBSTRUCTION: ICD-10-CM

## 2017-09-12 LAB — HBA1C MFR BLD: 8 % (ref 4.3–6)

## 2017-09-12 PROCEDURE — 84443 ASSAY THYROID STIM HORMONE: CPT | Performed by: FAMILY MEDICINE

## 2017-09-12 PROCEDURE — 36415 COLL VENOUS BLD VENIPUNCTURE: CPT | Performed by: FAMILY MEDICINE

## 2017-09-12 PROCEDURE — 71010 XR CHEST 1 VW: CPT

## 2017-09-12 PROCEDURE — 80061 LIPID PANEL: CPT | Performed by: FAMILY MEDICINE

## 2017-09-12 PROCEDURE — 82043 UR ALBUMIN QUANTITATIVE: CPT | Performed by: FAMILY MEDICINE

## 2017-09-12 PROCEDURE — 99214 OFFICE O/P EST MOD 30 MIN: CPT | Performed by: FAMILY MEDICINE

## 2017-09-12 PROCEDURE — 84460 ALANINE AMINO (ALT) (SGPT): CPT | Performed by: FAMILY MEDICINE

## 2017-09-12 PROCEDURE — 80048 BASIC METABOLIC PNL TOTAL CA: CPT | Performed by: FAMILY MEDICINE

## 2017-09-12 PROCEDURE — 83036 HEMOGLOBIN GLYCOSYLATED A1C: CPT | Performed by: FAMILY MEDICINE

## 2017-09-12 PROCEDURE — 84439 ASSAY OF FREE THYROXINE: CPT | Performed by: FAMILY MEDICINE

## 2017-09-12 PROCEDURE — G0103 PSA SCREENING: HCPCS | Performed by: FAMILY MEDICINE

## 2017-09-12 NOTE — LETTER
September 14, 2017      Riteshfareed Devlin  [address]   [facility] [address]        Dear ,    We are writing to inform you of your test results.    RESOLVED PREVIOUS PNEUMONIA   NO ACTIVE LUNG DISEASE AT THIS TIME     Resulted Orders   XR Chest 1 View    Narrative    CHEST ONE VIEW 9/12/2017 12:46 PM     HISTORY: Encounter for screening for respiratory tuberculosis    COMPARISON: 5/15/2017.      Impression    IMPRESSION: The previously seen minimal patchy infiltrate in the left  lung base has resolved. Currently, there is no active cardiopulmonary  disease.    GABI JOHNSON MD       If you have any questions or concerns, please call the clinic at the number listed above.       Sincerely,        [facility] XRAY ROOM 1

## 2017-09-12 NOTE — PROGRESS NOTES
SUBJECTIVE:   Suze Devlin is a 72 year old male who presents to clinic today for the following health issues:      Sore throat, tooth pulled 3 weeks ago      Duration: 12 days    Description (location/character/radiation): pain when swallowing swelling in neck, tooth pulled out 3 weeks ago    Intensity:  moderate    Accompanying signs and symptoms: feels fever at night    History (similar episodes/previous evaluation): None    Precipitating or alleviating factors: None    Therapies tried and outcome: None       S0RE THROAT X 12 DAYS     ANTIBIOTIC WITHOUT BENEFIT     DENTIST     HURTS  TO  CHEW AND SWALLOWING     NO RHINITIS     STARTED with TOOTH REMOVAL    SOME HEADACHES    LEFT EAR PAIN     NORMAL HEARING     NORMAL VISION     SWEATING  AT NIGHT     TAKING PAIN MEDICATIONS  ,THAT IS TYLENOL     EATING IS DIMINISHED    NO SIGNIFICANT GASTROESOPHAGEAL REFLUX DISEASE WITHOUT ESOPHAGITIS     STOOL SOFTENER     NO SIGNIFICANT LOWER BACK PAIN     BILATERAL OSTEOARTHRITIS KNEE PAIN     ANKLE AND HEELS WITHIN NORMAL LIMITS     OCCASIONAL NUMBNESS     HISTORY OF BENIGN PROSTATIC HYPERTROPHY SYMPTOMS     2-3 PER NIGHT NOCTURIA     HISTORY ABNORMAL PSA TEST     NORMAL URINE STREAM     VITAMIN D REPLACEMENT      DIABETES NEUROPATHY INTERMITTENT     SOME FATIGUE     ASTHMA IMPROVED STABLE     B12  DEFICIENCY   .MAKI MORA MD .9/12/2017 11:56 AM .September 12, 2017    Suze Devlin is a 72 year old male who is who presents with  LEFT SIDED NECK  ,THAT IS THYROID MASS  RECENT LEFT LOWER MOLAR EXTRACTION TREATMENT with AMOXIL 500MG PO THREE TIMES DAILY   Onset : 20 DAYS AGO    Severity:  MODERATE     Home treatments  MEDICATIONS AND EXTRACTION    Additional Symptoms:  PAIN AND DIFFICULTY SWALLOWING    Course  ENLARGED LEFT LOBE OF THYROID   ,Diabetes Follow-up    Patient is checking blood sugars:  OCCASIONAL   Diabetic concerns: None   Symptoms of hypoglycemia (low blood sugar): none   Paresthesias  (numbness or burning in feet) or sores:  OCCASIONAL    Date of last diabetic eye exam:  RECOMMENDED     Hyperlipidemia Follow-Up    Rate your low fat/cholesterol diet?: good  Taking statin?  Yes, no muscle aches from statin  Other lipid medications/supplements?:  none    Hypertension Follow-up    Outpatient blood pressures are not being checked.  Low Salt Diet: no added salt      Amount of exercise or physical activity: 6-7 days/week for an average of 30-45 minutes  Problems taking medications regularly: No  Medication side effects: none  Diet: low salt, low fat/cholesterol and diabetic   **    .  Current Outpatient Prescriptions   Medication Sig Dispense Refill     sitagliptin (JANUVIA) 50 MG tablet Take 1 tablet (50 mg) by mouth daily 90 tablet 1     glipiZIDE (GLUCOTROL XL) 10 MG 24 hr tablet Take 1 tablet (10 mg) by mouth daily 90 tablet 1     pioglitazone (ACTOS) 15 MG tablet Take 1 tablet (15 mg) by mouth daily 90 tablet 1     omeprazole (PRILOSEC) 40 MG capsule Take 1 capsule (40 mg) by mouth daily Take 30-60 minutes before a meal. 91 capsule 3     senna-docusate (SENOKOT-S;PERICOLACE) 8.6-50 MG per tablet Take 1 tablet by mouth 2 times daily 120 tablet 11     tamsulosin (FLOMAX) 0.4 MG capsule Take 1 capsule (0.4 mg) by mouth daily 90 capsule 3     metFORMIN modified (GLUMETZA) 1000 MG 24 hr tablet Take 1 tablet (1,000 mg) by mouth daily (with dinner) 30 tablet 11     finasteride (PROSCAR) 5 MG tablet Take 1 tablet (5 mg) by mouth daily 90 tablet 3     traMADol (ULTRAM) 50 MG tablet Take 1 tablet (50 mg) by mouth 2 times daily as needed for moderate pain 240 tablet 0     fluticasone-salmeterol (ADVAIR) 100-50 MCG/DOSE diskus inhaler Inhale 1 puff into the lungs 2 times daily 1 Inhaler 3     albuterol (PROAIR HFA/PROVENTIL HFA/VENTOLIN HFA) 108 (90 BASE) MCG/ACT Inhaler Inhale 2 puffs into the lungs 4 times daily as needed 1 Inhaler 11     valACYclovir (VALTREX) 1000 mg tablet Take 1 tablet (1,000 mg) by  mouth 3 times daily 21 tablet 0     gabapentin (NEURONTIN) 300 MG capsule Take 1 tablet (300 mg) every night for 1 days, then 1 tablet twice daily for 1  days, then 1 tablet three times daily 90 capsule 3     atorvastatin (LIPITOR) 20 MG tablet Take 1 tablet (20 mg) by mouth daily 90 tablet 1     losartan (COZAAR) 25 MG tablet Take 1 tablet (25 mg) by mouth daily 90 tablet 1     aspirin 81 MG EC tablet Take 1 tablet (81 mg) by mouth daily 90 tablet 3     cholecalciferol (VITAMIN D3) 29792 UNITS capsule Take 1 capsule (50,000 Units) by mouth once a week 4 capsule 11     acetaminophen 500 MG CAPS Take 1 tablet by mouth 4 times daily 120 capsule 11     Cyanocobalamin (B-12) 1000 MCG TBCR Take 1,000 mcg by mouth daily 90 tablet 2     order for DME Lancets.  Four times daily and prn. Fill per patient's insurance.      Microlet lancets 400 each 1     order for DME Equipment being ordered: Nebulizer  ONE  USE Q 4 HOURS PRN EXACERBATION OF ASTHMA ML  ALBUTEROL 2.5MG PER 3ML   (J45.20) Mild intermittent asthma without complication  (primary encounter diagnosis)    MAKI MORA JR., MD 1 each 1     ipratropium - albuterol 0.5 mg/2.5 mg/3 mL (DUONEB) 0.5-2.5 (3) MG/3ML nebulization Take 1 vial (3 mLs) by nebulization every 6 hours as needed for shortness of breath / dyspnea or wheezing 2.5 mL o     blood glucose monitoring (NO BRAND SPECIFIED) meter device kit Use to test blood sugar two times daily or as directed. 1 kit 0     blood glucose monitoring (NO BRAND SPECIFIED) test strip Use to test blood sugars two times daily or as directed 100 each 4     diclofenac (VOLTAREN) 1 % GEL Apply 4 grams to knees or 2 grams to hands four times daily using enclosed dosing card. 100 g 1     blood glucose monitoring (ONE TOUCH DELICA) lancets Use to test blood sugars  TWICE DAILY    or as directed. 3 Box prn     lidocaine (XYLOCAINE) 5 % ointment One application 4 x daily to affected areas lower back and knees if necessary 300 g 0      tadalafil (CIALIS) 5 MG tablet Take 1 tablet (5 mg) by mouth daily Never use with nitroglycerin, terazosin or doxazosin. 90 tablet 0     triamcinolone (KENALOG) 0.1 % paste Take by mouth 2 times daily 5 g 11        No Known Allergies    Immunization History   Administered Date(s) Administered     Influenza (High Dose) 3 valent vaccine 10/03/2014, 10/13/2015     Pneumococcal 23 valent 10/17/2014     TD (ADULT, 7+) 04/04/2005, 02/03/2006, 08/08/2006     TDAP Vaccine (Adacel) 10/13/2015     Varicella 04/04/2005, 02/03/2006         reports that he does not drink alcohol.      reports that he does not use illicit drugs.    family history includes Unknown/Adopted in his mother.    indicated that the status of his mother is unknown. He indicated that all of his four daughters are alive. He indicated that all of his five sons are alive.      has a past surgical history that includes hernia repair, inguinal rt/lt.     reports that he does not engage in sexual activity.  .  Pediatric History   Patient Guardian Status     Not on file.     Other Topics Concern     Parent/Sibling W/ Cabg, Mi Or Angioplasty Before 65f 55m? No     Social History Narrative         reports that he has never smoked. He has never used smokeless tobacco.    Medical, social, surgical, and family histories reviewed.    Labs reviewed in EPIC  Patient Active Problem List   Diagnosis     Health Care Home     Cataract     Elevated prostate specific antigen (PSA)     Psychophysiological insomnia     Vitamin D deficiency     Hypertrophy of prostate without urinary obstruction     Memory loss     Anxiety state     Gastroesophageal reflux disease without esophagitis     Primary localized osteoarthrosis, lower leg     Vitamin D insufficiency     Chronic idiopathic constipation     Vitamin B 12 deficiency     Hypertension goal BP (blood pressure) < 140/80     Chronic fatigue     Hyperlipidemia LDL goal <100     GERD (gastroesophageal reflux disease)      Asthma, mild intermittent     Diabetic neuropathy (H)     Lumbago     ACP (advance care planning)     Type 2 diabetes mellitus without complication, without long-term current use of insulin (H)     Mild intermittent asthma without complication     Screening examination for pulmonary tuberculosis     Past Surgical History:   Procedure Laterality Date     HERNIA REPAIR, INGUINAL RT/LT         Social History   Substance Use Topics     Smoking status: Never Smoker     Smokeless tobacco: Never Used     Alcohol use No     Family History   Problem Relation Age of Onset     Unknown/Adopted Mother      medical history of family is unknown.         No Known Allergies  Recent Labs   Lab Test  09/12/17   1227  05/15/17   1209  10/31/16   1459  03/11/16   1000   03/11/15   1054  10/03/14   1114  03/14/14   1137   A1C  8.0*  8.0*  6.7*  9.7*   < >  7.9*  6.3*  6.9*   LDL   --    --   85  135*   --   78  94  126   HDL   --    --   47  55   --   49  53  44   TRIG   --    --   146  122   --   134  93  149   ALT   --    --   28   --    --   19  23  18   CR   --    --   1.04  1.00   < >  1.00  1.10   --    GFRESTIMATED   --    --   70  73   < >  74  66   --    GFRESTBLACK   --    --   85  89   < >  89  80   --    POTASSIUM   --    --   4.4  5.3   < >  4.2  4.3   --    TSH   --    --    --   4.36   --    --    --   5.59*    < > = values in this interval not displayed.        BP Readings from Last 6 Encounters:   09/12/17 128/68   05/22/17 104/54   05/15/17 110/56   05/08/17 116/56   03/10/17 114/60   10/31/16 108/54       Wt Readings from Last 3 Encounters:   09/12/17 243 lb (110.2 kg)   05/22/17 247 lb (112 kg)   05/15/17 243 lb 8 oz (110.5 kg)         Positive symptoms or findings indicated by bold designation:     ROS: 10 point ROS neg other than the symptoms noted above in the HPI.except  has Health Care Home; Cataract; Elevated prostate specific antigen (PSA); Psychophysiological insomnia; Vitamin D deficiency; Hypertrophy of  prostate without urinary obstruction; Memory loss; Anxiety state; Gastroesophageal reflux disease without esophagitis; Primary localized osteoarthrosis, lower leg; Vitamin D insufficiency; Chronic idiopathic constipation; Vitamin B 12 deficiency; Hypertension goal BP (blood pressure) < 140/80; Chronic fatigue; Hyperlipidemia LDL goal <100; GERD (gastroesophageal reflux disease); Asthma, mild intermittent; Diabetic neuropathy (H); Lumbago; Controlled type 2 diabetes mellitus with microalbuminuria or microproteinuria; ACP (advance care planning); Type 2 diabetes mellitus with diabetic nephropathy, without long-term current use of insulin (H); and Mild intermittent asthma without complication on his problem list.   Constitutional: The patient denied fatigue, fever, insomnia, night sweats, recent illness and weight loss.  MORBID OBESITY     Eyes: The patient denied blindness, eye pain, eye tearing, photophobia, vision change and visual disturbance. OK STABLE       Ears/Nose/Throat/Neck: The patient denied dizziness, facial pain, hearing loss, nasal discharge, oral pain, otalgia, postnasal drip, sinus congestion, sore throat, tinnitus and voice change.   NORMAL HEARING AND BALANCE     Cardiovascular: The patient denied arrhythmia, chest pain/pressure, claudication, edema, exercise intolerance, fatigue, orthopnea, palpitations and syncope.  OK     Respiratory: The patient denied asthma, chest congestion, cough, dyspnea on exertion, dyspnea/shortness of breath, hemoptysis, pedal edema, pleuritic pain, productive sputum, snoring and wheezing. NORMAL     Gastrointestinal: The patient denied abdominal pain, anorexia, constipation, diarrhea, dysphagia, gastroesophageal reflux, hematochezia, hemorrhoids, melena, nausea and vomiting . GASTROESOPHAGEAL REFLUX DISEASE WITHOUT ESOPHAGITIS     Genitourinary/Nephrology: The patient denied breast complaint, dysuria, nocturia sexual dysfunction, t, urinary frequency, urinary  "incontinence, urinary urgency    BLADDER NOCTURIA X 3     Musculoskeletal: The patient denied arthralgia(s), back pain, joint complaint, muscle weakness, myalgias, osteoporosis, sciatica, stiffness and swelling.  OSTEOARTHRITIS KNEES AND CHRONIC LOWER BACK PAIN IMPROVED TODAY     Dermatoligic:: The patient denied acne, dermatitis, ecchymosis, itching, mole change, rash, skin cancer, skin lesion and sores.      Neurologic: The patient denied dizziness, gait abnormality, headache, memory loss, mental status change, paresis, paresthesia, seizure, syncope, tremor and vision change.     Psychiatric: The patient denied anxiety, depression, disturbances of memory, drug abuse, insomnia, mood swings and relationship difficulties.      Endocrine: The patient denied , goiter, obesity, polyuria and thyroid disease.  DIABETES 2 GOAL 8%   LDL OR \"BAD\" CHOLESTEROL  GOAL < 100   HYPERTENSION WITH GOAL OF LESS THAN 140/80     Hematologic/Lymphatic: The patient denied abnormal bleeding and bruising, abnormal ecchymoses, anemia, lymph node enlargement/mass, petechiae and venous  Thrombosis.      Allergy/Immunology: The patient denied food allergy and  Allergic rhinitis or conjunctivitis.        PE:  /68  Pulse 82  Temp 97.1  F (36.2  C) (Tympanic)  Resp 16  Wt 243 lb (110.2 kg)  SpO2 98%  BMI 34.87 kg/m2 Body mass index is 34.87 kg/(m^2).    Constitutional: general appearance, well nourished, well developed, in no acute distress, well developed, appears stated age, normal body habitus,      Eyes:; The patient has normal eyelids sclerae and conjunctivae :      Ears/Nose/Throat: external ear, overall: normal appearance; external nose, overall: benign appearance, normal moujth gums and lips  The patient has:  RECENT DENTAL EXTRACTION     Neck: thyroid, overall: normal size, normal consistency, nontender,  ENLARGED LEFT LOBE OF THYROID    Respiratory:  palpation of chest, overall: normal excursion,    Clear to percussion and " auscultation      Tachypnea   Color  WITHIN NORMAL LIMITS       Cardiovascular:  Good color with no peripheral edema  NORMAL    Regular sinus rhythm without murmur. Physiologic heart sounds Heart is unelarged  .   Chest/Breast: normal shape  NORMAL        Abdominal exam,  Liver and spleen are  unenlarged NORMAL        Tenderness  NORMAL    Scars  NORMAL     Urogenital; no renal, flank or bladder  tenderness;  NORMAL  NO BLADDER YVETTE N      Lymphatic: neck nodes,  NORMAL    Other nodes NOT APPLICABLE      Musculoskeletal:  Brief ortho exam normal except:   NORMAL RANGE OF MOTION UPPER EXTREMETIES   NORMAL SHOULDER AND NECK RANGE OF MOTION   RANGE OF MOTION OF LOWER BACK WITHIN NORMAL LIMITS      Integument: inspection of skin, no rash, lesions; and, palpation, no induration, no tenderness.  NORMAL      Neurologic mental status, overall: alert and oriented; gait, no ataxia, no unsteadiness; coordination, no tremors; cranial nerves, overall: normal motor, overall: normal bulk, tone.  NORMAL      Psychiatric: orientation/consciousness, overall: oriented to person, place and time; behavior/psychomotor activity, no tics, normal psychomotor activity; mood and affect, overall: normal mood and affect; appearance, overall: well-groomed, good eye contact; speech, overall: normal quality, no aphasia and normal quality, quantity, intact.  NORMAL      Diagnostic Test Results:  Results for orders placed or performed in visit on 09/12/17   Hemoglobin A1c   Result Value Ref Range    Hemoglobin A1C 8.0 (H) 4.3 - 6.0 %         ICD-10-CM    1. Thyroid enlargement E01.0 ENDOCRINOLOGY ADULT REFERRAL     TSH     T4, free    LEFT LOBE OF THRYOID    2. Type 2 diabetes mellitus without complication, without long-term current use of insulin (H) E11.9 Basic metabolic panel     Hemoglobin A1c     Albumin Random Urine Quantitative with Creat Ratio   3. Throat pain R07.0    4. Hypertension goal BP (blood pressure) < 140/80 I10    5. Hyperlipidemia  LDL goal <100 E78.5 Lipid panel reflex to direct LDL     ALT   6. Mild intermittent asthma without complication J45.20    7. Psychophysiological insomnia F51.04    8. Gastroesophageal reflux disease without esophagitis K21.9    9. Vitamin B 12 deficiency E53.8    10. Vitamin D insufficiency E55.9    11. Hypertrophy of prostate without urinary obstruction N40.0    12. Elevated prostate specific antigen (PSA) R97.20    13. Screening examination for pulmonary tuberculosis Z11.1 XR Chest 1 View   14. Special screening for malignant neoplasm of prostate Z12.5 Prostate spec antigen screen   15. Special screening for malignant neoplasms, colon Z12.11 Fecal colorectal cancer screen (FIT)   16. History of tooth extraction, unspecified edentulism class K08.409 RADIOLOGY REFERRAL     AUG 20 2017        .  Side effects benefits and risks thoroughly discussed. .he may come in early if unimproved or getting worse      Importance of adhering to regimen discussed and if medications were dispensed, the importance of taking medications discussed and bringing in the medications after every visit for chronic problems     Please drink 2 glasses of water prior to meals and walk 15-30 minutes after meals    I spent  25 MINUTES SPENT  with patient discussing the following issues   The primary encounter diagnosis was Thyroid enlargement. Diagnoses of Type 2 diabetes mellitus without complication, without long-term current use of insulin (H), Throat pain, Hypertension goal BP (blood pressure) < 140/80, Hyperlipidemia LDL goal <100, Mild intermittent asthma without complication, Psychophysiological insomnia, Gastroesophageal reflux disease without esophagitis, Vitamin B 12 deficiency, Vitamin D insufficiency, Hypertrophy of prostate without urinary obstruction, Elevated prostate specific antigen (PSA), Screening examination for pulmonary tuberculosis, Special screening for malignant neoplasm of prostate, Special screening for malignant  neoplasms, colon, and History of tooth extraction, unspecified edentulism class were also pertinent to this visit. over half of which involved counseling and coordination of care.    Patient Instructions   (E01.0) Thyroid enlargement  (primary encounter diagnosis)  Comment: LEFT LOBE OF THRYOID   Plan: ENDOCRINOLOGY ADULT REFERRAL, TSH, T4, free  [facility] RADIOLOGY  [phone]  [phone] ULTRASOUND THYROID AS SOON AS POSSIBLE              (E11.9) Type 2 diabetes mellitus without complication, without long-term current use of insulin (H)  Comment:    Plan: Basic metabolic panel, Hemoglobin A1c, Albumin         Random Urine Quantitative with Creat Ratio             (R07.0) Throat pain  Comment:    Plan:      (I10) Hypertension goal BP (blood pressure) < 140/80  Comment:    Plan:      (E78.5) Hyperlipidemia LDL goal <100  Comment:    Plan: Lipid panel reflex to direct LDL, ALT             (J45.20) Mild intermittent asthma without complication  Comment:    Plan:      (F51.04) Psychophysiological insomnia  Comment:    Plan:      (K21.9) Gastroesophageal reflux disease without esophagitis  Comment:    Plan:      (E53.8) Vitamin B 12 deficiency  Comment:    Plan:      (E55.9) Vitamin D insufficiency  Comment:    Plan:      (N40.0) Hypertrophy of prostate without urinary obstruction  Comment:    Plan:      (R97.20) Elevated prostate specific antigen (PSA)  Comment:    Plan:      (Z11.1) Screening examination for pulmonary tuberculosis  Comment:    Plan: XR Chest 1 View             (Z12.5) Special screening for malignant neoplasm of prostate  Comment:    Plan: Prostate spec antigen screen             (Z12.11) Special screening for malignant neoplasms, colon  Comment:    Plan: Fecal colorectal cancer screen (FIT)             (K08.409) History of tooth extraction, unspecified edentulism class  Comment:  AUG 20 2017  Plan: RADIOLOGY REFERRAL                       ALL THE ABOVE PROBLEMS ARE STABLE AND MED CHANGES AS  NOTED    Diet:  MEDITERRANEAN DIET AND DIABETES      Exercise:    Exercises Range of motion, balance, isometric, and strengthening exercises 30 repetitions twice daily of involved joints      .MAKI MORA MD 9/12/2017 11:56 AM  September 12, 2017

## 2017-09-12 NOTE — LETTER
"September 14, 2017      Suze Devlin  [address]   [facility] [address]        Dear ,    We are writing to inform you of your test results.    NORMAL DIABETES URINE PROTEIN TEST   NORMAL PSA OR PROSTATE CANCER SCREENING TEST,     NORMAL LIVER FUNCTION TEST   NORMAL FREE T4 THYROID HORMONE LEVEL   NORMAL THYROID STIMULATING HORMONE TEST   NORMAL DIABETES URINE PROTEIN TEST     NORMAL GLUCOSE, RENAL AND BLOOD SALTS  EXCEPT VERY HIGH GLUCOSE\   NORMAL TOTAL CHOLESTEROL   BORDERLINE  TRIGLYCERIDES   NORMAL HDL OR \"GOOD\" CHOLESTEROL   NORMAL LDL OR \"BAD\" CHOLESTEROL   NORMAL VERY LOW DENSITY CHOLESTEROL   NORMAL LIVER FUNCTION TEST   NORMAL AND IMPROVED NORMAL PSA OR PROSTATE CANCER SCREENING TEST,   THREE MONTH GLUCOSE AVERAGE  STILL BORDERLINE  POOR CONTROL   CALL OR COME BACK FOR MEDICATION ADJUSTMENT AS SOON AS POSSIBLE     Resulted Orders   Basic metabolic panel   Result Value Ref Range    Sodium 136 133 - 144 mmol/L    Potassium 4.1 3.4 - 5.3 mmol/L    Chloride 102 94 - 109 mmol/L    Carbon Dioxide 29 20 - 32 mmol/L    Anion Gap 5 3 - 14 mmol/L    Glucose 356 (H) 70 - 99 mg/dL      Comment:      Non Fasting    Urea Nitrogen 14 7 - 30 mg/dL    Creatinine 1.09 0.66 - 1.25 mg/dL    GFR Estimate 66 >60 mL/min/1.7m2      Comment:      Non  GFR Calc    GFR Estimate If Black 80 >60 mL/min/1.7m2      Comment:       GFR Calc    Calcium 10.0 8.5 - 10.1 mg/dL   Lipid panel reflex to direct LDL   Result Value Ref Range    Cholesterol 150 <200 mg/dL    Triglycerides 158 (H) <150 mg/dL      Comment:      Borderline high:  150-199 mg/dl  High:             200-499 mg/dl  Very high:       >499 mg/dl  Non Fasting      HDL Cholesterol 44 >39 mg/dL    LDL Cholesterol Calculated 74 <100 mg/dL      Comment:      Desirable:       <100 mg/dl    Non HDL Cholesterol 106 <130 mg/dL   Hemoglobin A1c   Result Value Ref Range    Hemoglobin A1C 8.0 (H) 4.3 - 6.0 %      Comment:      " Reviewed: OK with previous   ALT   Result Value Ref Range    ALT 30 0 - 70 U/L   Albumin Random Urine Quantitative with Creat Ratio   Result Value Ref Range    Creatinine Urine 65 mg/dL    Albumin Urine mg/L 8 mg/L    Albumin Urine mg/g Cr 13.06 0 - 17 mg/g Cr   Prostate spec antigen screen   Result Value Ref Range    PSA 2.52 0 - 4 ug/L      Comment:      Assay Method:  Chemiluminescence using Siemens Vista analyzer   TSH   Result Value Ref Range    TSH 3.75 0.40 - 4.00 mU/L   T4, free   Result Value Ref Range    T4 Free 1.04 0.76 - 1.46 ng/dL       If you have any questions or concerns, please call the clinic at the number listed above.       Sincerely,        MAKI MORA MD

## 2017-09-12 NOTE — MR AVS SNAPSHOT
After Visit Summary   9/12/2017    Suze Devlin    MRN: [id number]           Patient Information     Date Of Birth          1945        Visit Information        Provider Department      9/12/2017 11:30 AM Zack Hudson MD; [employer] [facility]        Today's Diagnoses     Thyroid enlargement    -  1    Type 2 diabetes mellitus without complication, without long-term current use of insulin (H)        Throat pain        Hypertension goal BP (blood pressure) < 140/80        Hyperlipidemia LDL goal <100        Mild intermittent asthma without complication        Psychophysiological insomnia        Gastroesophageal reflux disease without esophagitis        Vitamin B 12 deficiency        Vitamin D insufficiency        Hypertrophy of prostate without urinary obstruction        Elevated prostate specific antigen (PSA)        Screening examination for pulmonary tuberculosis        Special screening for malignant neoplasm of prostate        Special screening for malignant neoplasms, colon        History of tooth extraction, unspecified edentulism class          Care Instructions    (E01.0) Thyroid enlargement  (primary encounter diagnosis)  Comment: LEFT LOBE OF THRYOID   Plan: ENDOCRINOLOGY ADULT REFERRAL, TSH, T4, free  [facility]  [phone]  [phone] ULTRASOUND THYROID AS SOON AS POSSIBLE              (E11.9) Type 2 diabetes mellitus without complication, without long-term current use of insulin (H)  Comment:    Plan: Basic metabolic panel, Hemoglobin A1c, Albumin         Random Urine Quantitative with Creat Ratio             (R07.0) Throat pain  Comment:    Plan:      (I10) Hypertension goal BP (blood pressure) < 140/80  Comment:    Plan:      (E78.5) Hyperlipidemia LDL goal <100  Comment:    Plan: Lipid panel reflex to direct LDL, ALT             (J45.20) Mild intermittent asthma without complication  Comment:     Plan:      (F51.04) Psychophysiological insomnia  Comment:    Plan:      (K21.9) Gastroesophageal reflux disease without esophagitis  Comment:    Plan:      (E53.8) Vitamin B 12 deficiency  Comment:    Plan:      (E55.9) Vitamin D insufficiency  Comment:    Plan:      (N40.0) Hypertrophy of prostate without urinary obstruction  Comment:    Plan:      (R97.20) Elevated prostate specific antigen (PSA)  Comment:    Plan:      (Z11.1) Screening examination for pulmonary tuberculosis  Comment:    Plan: XR Chest 1 View             (Z12.5) Special screening for malignant neoplasm of prostate  Comment:    Plan: Prostate spec antigen screen             (Z12.11) Special screening for malignant neoplasms, colon  Comment:    Plan: Fecal colorectal cancer screen (FIT)             (K08.409) History of tooth extraction, unspecified edentulism class  Comment:  AUG 20 2017  Plan: RADIOLOGY REFERRAL                           Follow-ups after your visit        Additional Services     ENDOCRINOLOGY ADULT REFERRAL       Your provider has referred you to: [facility] [phone]  LEFT THYROID LOBE ENLARGEMENT  AND TENDERNESS  20 DAYS AFTER TOOTH EXTRACTION PROBABLE RED HERRING  ON AMOXIL 500MG PO THREE TIMES DAILY  FROM DENTIST  THYROID ULTRASOUND AT [facility]  [phone]  [phone]       Please be aware that coverage of these services is subject to the terms and limitations of your health insurance plan.  Call member services at your health plan with any benefit or coverage questions.      Please bring the following to your appointment:    >>   Any x-rays, CTs or MRIs which have been performed.  Contact the facility where they were done to arrange for  prior to your scheduled appointment.    >>   List of current medications   >>   This referral request   >>   Any documents/labs given to you for this referral            RADIOLOGY REFERRAL       Your provider has  referred you to:  [facility]  [phone]  [phone]  ENLARGED LEFT LOBE OF THYROID X 2 WEEKS  AND PAINFUL   AFTER TOOTH EXTRACTION   THYROID ULTRASOUND     Please be aware that coverage of these services is subject to the terms and limitations of your health insurance plan.  Call member services at your health plan with any benefit or coverage questions.      Please bring the following to your appointment:    >>   Any x-rays, CTs or MRIs which have been performed.  Contact the facility where they were done to arrange for  prior to your scheduled appointment.    >>   List of current medications   >>   This referral request   >>   Any documents/labs given to you for this referral    Prior authorization is required for MRI/MRA, CT, Dexa Scans and Worker's Compensation cases.                  Future tests that were ordered for you today     Open Future Orders        Priority Expected Expires Ordered    XR Chest 1 View Routine 9/12/2017 9/12/2018 9/12/2017    Fecal colorectal cancer screen (FIT) Routine 10/3/2017 12/5/2017 9/12/2017            Who to contact     If you have questions or need follow up information about today's clinic visit or your schedule please contact [facility] directly at [phone].  Normal or non-critical lab and imaging results will be communicated to you by MyChart, letter or phone within 4 business days after the clinic has received the results. If you do not hear from us within 7 days, please contact the clinic through [employer]hart or phone. If you have a critical or abnormal lab result, we will notify you by phone as soon as possible.  Submit refill requests through [employer] or call your pharmacy and they will forward the refill request to us. Please allow 3 business days for your refill to be completed.          Additional Information About Your Visit        [employer] Information     [employer] lets you send messages to your doctor, view your  "test results, renew your prescriptions, schedule appointments and more. To sign up, go to [email or web address] . Click on \"Log in\" on the left side of the screen, which will take you to the Welcome page. Then click on \"Sign up Now\" on the right side of the page.     You will be asked to enter the access code listed below, as well as some personal information. Please follow the directions to create your username and password.     Your access code is: [email or web address]  Expires: 2017 12:30 PM     Your access code will  in 90 days. If you need help or a new code, please call your [facility] clinic or [phone].        Care EveryWhere ID     This is your Care EveryWhere ID. This could be used by other organizations to access your [facility] medical records  [id number]        Your Vitals Were     Pulse Temperature Respirations Pulse Oximetry BMI (Body Mass Index)       82 97.1  F (36.2  C) (Tympanic) 16 98% 34.87 kg/m2        Blood Pressure from Last 3 Encounters:   17 128/68   17 104/54   05/15/17 110/56    Weight from Last 3 Encounters:   17 243 lb (110.2 kg)   17 247 lb (112 kg)   05/15/17 243 lb 8 oz (110.5 kg)              We Performed the Following     Albumin Random Urine Quantitative with Creat Ratio     ALT     Basic metabolic panel     ENDOCRINOLOGY ADULT REFERRAL     Hemoglobin A1c     Lipid panel reflex to direct LDL     Prostate spec antigen screen     RADIOLOGY REFERRAL     T4, free     TSH        Primary Care Provider Office Phone # Fax #    Zack Myriam Hudson [phone] [address] [facility] [address]        Equal Access to Services     [facility] : Hadluz Carias, warosarioda davi, qaybta kaalmada erich, lisa shaw. So [facility] [phone].    ATENCIÓN: Si habla español, tiene a wells disposición servicios gratuitos de asistencia lingüística. Llame al [phone].    We comply with " applicable federal civil rights laws and [place] laws. We do not discriminate on the basis of race, color, national origin, age, disability sex, sexual orientation or gender identity.            Thank you!     Thank you for choosing [facility]  for your care. Our goal is always to provide you with excellent care. Hearing back from our patients is one way we can continue to improve our services. Please take a few minutes to complete the written survey that you may receive in the mail after your visit with us. Thank you!             Your Updated Medication List - Protect others around you: Learn how to safely use, store and throw away your medicines at www.disposemymeds.org.          This list is accurate as of: 9/12/17 12:31 PM.  Always use your most recent med list.                   Brand Name Dispense Instructions for use Diagnosis    acetaminophen 500 MG Caps     120 capsule    Take 1 tablet by mouth 4 times daily    Primary osteoarthritis of both knees       albuterol 108 (90 BASE) MCG/ACT Inhaler    PROAIR HFA/PROVENTIL HFA/VENTOLIN HFA    1 Inhaler    Inhale 2 puffs into the lungs 4 times daily as needed    Wheezing, Mild intermittent asthma with acute exacerbation       aspirin 81 MG EC tablet     90 tablet    Take 1 tablet (81 mg) by mouth daily    Hyperlipidemia LDL goal <100       atorvastatin 20 MG tablet    LIPITOR    90 tablet    Take 1 tablet (20 mg) by mouth daily    Hyperlipidemia LDL goal <100, Flatulence, eructation, and gas pain       B-12 1000 MCG Tbcr     90 tablet    Take 1,000 mcg by mouth daily    Vitamin B 12 deficiency       blood glucose monitoring lancets     3 Box    Use to test blood sugars  TWICE DAILY    or as directed.    Type 2 diabetes mellitus without complication (H)       blood glucose monitoring meter device kit    no brand specified    1 kit    Use to test blood sugar two times daily or as directed.    Type 2 diabetes mellitus with diabetic  nephropathy (H)       blood glucose monitoring test strip    no brand specified    100 each    Use to test blood sugars two times daily or as directed    Type 2 diabetes mellitus with diabetic nephropathy (H)       cholecalciferol 14463 UNITS capsule    VITAMIN D3    4 capsule    Take 1 capsule (50,000 Units) by mouth once a week    Vitamin D insufficiency       diclofenac 1 % Gel topical gel    VOLTAREN    100 g    Apply 4 grams to knees or 2 grams to hands four times daily using enclosed dosing card.    Primary osteoarthritis of left knee       finasteride 5 MG tablet    PROSCAR    90 tablet    Take 1 tablet (5 mg) by mouth daily    BPH associated with nocturia       fluticasone-salmeterol 100-50 MCG/DOSE diskus inhaler    ADVAIR    1 Inhaler    Inhale 1 puff into the lungs 2 times daily    Acute bronchitis, unspecified organism       gabapentin 300 MG capsule    NEURONTIN    90 capsule    Take 1 tablet (300 mg) every night for 1 days, then 1 tablet twice daily for 1  days, then 1 tablet three times daily    Varicella-zoster virus infection       glipiZIDE 10 MG 24 hr tablet    GLUCOTROL XL    90 tablet    Take 1 tablet (10 mg) by mouth daily    Type 2 diabetes mellitus with diabetic nephropathy, without long-term current use of insulin (H)       ipratropium - albuterol 0.5 mg/2.5 mg/3 mL 0.5-2.5 (3) MG/3ML neb solution    DUONEB    2.5 mL    Take 1 vial (3 mLs) by nebulization every 6 hours as needed for shortness of breath / dyspnea or wheezing    Acute bronchitis due to other specified organisms, Wheezing, Mild intermittent asthma with acute exacerbation       lidocaine 5 % ointment    XYLOCAINE    300 g    One application 4 x daily to affected areas lower back and knees if necessary    Midline low back pain with sciatica, sciatica laterality unspecified       losartan 25 MG tablet    COZAAR    90 tablet    Take 1 tablet (25 mg) by mouth daily    Hypertension goal BP (blood pressure) < 140/80       metFORMIN  modified 1000 MG 24 hr tablet    GLUMETZA    30 tablet    Take 1 tablet (1,000 mg) by mouth daily (with dinner)    Slow transit constipation       omeprazole 40 MG capsule    priLOSEC    91 capsule    Take 1 capsule (40 mg) by mouth daily Take 30-60 minutes before a meal.    Flatulence, eructation and gas pain, Gastroesophageal reflux disease without esophagitis       * order for DME     1 each    Equipment being ordered: Nebulizer ONE USE Q 4 HOURS PRN EXACERBATION OF ASTHMA ML ALBUTEROL 2.5MG PER 3ML  (J45.20) Mild intermittent asthma without complication  (primary encounter diagnosis)  MAKI MORA JR., MD    Mild intermittent asthma without complication       * order for DME     400 each    Lancets.  Four times daily and prn. Fill per patient's insurance.   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)       senna-docusate 8.6-50 MG per tablet    SENOKOT-S;PERICOLACE    120 tablet    Take 1 tablet by mouth 2 times daily    Slow transit constipation       sitagliptin 50 MG tablet    JANUVIA    90 tablet    Take 1 tablet (50 mg) by mouth daily    Type 2 diabetes mellitus with diabetic nephropathy, without long-term current use of insulin (H)       tadalafil 5 MG tablet    CIALIS    90 tablet    Take 1 tablet (5 mg) by mouth daily Never use with nitroglycerin, terazosin or doxazosin.    Sexual dysfunction       tamsulosin 0.4 MG capsule    FLOMAX    90 capsule    Take 1 capsule (0.4 mg) by mouth daily    Nocturia       traMADol 50 MG tablet    ULTRAM    240 tablet    Take 1 tablet (50 mg) by mouth 2 times daily as needed for moderate pain    Chronic midline low back pain without sciatica        triamcinolone 0.1 % paste    KENALOG    5 g    Take by mouth 2 times daily    Lesion of lip       valACYclovir 1000 mg tablet    VALTREX    21 tablet    Take 1 tablet (1,000 mg) by mouth 3 times daily    Varicella-zoster virus infection       * Notice:  This list has 2 medication(s) that are the same as other medications prescribed for you. Read the directions carefully, and ask your doctor or other care provider to review them with you.

## 2017-09-12 NOTE — PATIENT INSTRUCTIONS
(E01.0) Thyroid enlargement  (primary encounter diagnosis)  Comment: LEFT LOBE OF THRYOID   Plan: ENDOCRINOLOGY ADULT REFERRAL, TSH, T4, free  [facility]  [phone]  [phone] ULTRASOUND THYROID AS SOON AS POSSIBLE              (E11.9) Type 2 diabetes mellitus without complication, without long-term current use of insulin (H)  Comment:    Plan: Basic metabolic panel, Hemoglobin A1c, Albumin         Random Urine Quantitative with Creat Ratio             (R07.0) Throat pain  Comment:    Plan:      (I10) Hypertension goal BP (blood pressure) < 140/80  Comment:    Plan:      (E78.5) Hyperlipidemia LDL goal <100  Comment:    Plan: Lipid panel reflex to direct LDL, ALT             (J45.20) Mild intermittent asthma without complication  Comment:    Plan:      (F51.04) Psychophysiological insomnia  Comment:    Plan:      (K21.9) Gastroesophageal reflux disease without esophagitis  Comment:    Plan:      (E53.8) Vitamin B 12 deficiency  Comment:    Plan:      (E55.9) Vitamin D insufficiency  Comment:    Plan:      (N40.0) Hypertrophy of prostate without urinary obstruction  Comment:    Plan:      (R97.20) Elevated prostate specific antigen (PSA)  Comment:    Plan:      (Z11.1) Screening examination for pulmonary tuberculosis  Comment:    Plan: XR Chest 1 View             (Z12.5) Special screening for malignant neoplasm of prostate  Comment:    Plan: Prostate spec antigen screen             (Z12.11) Special screening for malignant neoplasms, colon  Comment:    Plan: Fecal colorectal cancer screen (FIT)             (K08.409) History of tooth extraction, unspecified edentulism class  Comment:  AUG 20 2017  Plan: RADIOLOGY REFERRAL

## 2017-09-12 NOTE — NURSING NOTE
"Chief Complaint   Patient presents with     Throat Problem       Initial /68  Pulse 82  Temp 97.1  F (36.2  C) (Tympanic)  Resp 16  Wt 243 lb (110.2 kg)  SpO2 98%  BMI 34.87 kg/m2 Estimated body mass index is 34.87 kg/(m^2) as calculated from the following:    Height as of 5/15/17: 5' 10\" (1.778 m).    Weight as of this encounter: 243 lb (110.2 kg).  Medication Reconciliation: complete   Katina Marie CMA    "

## 2017-09-13 LAB
ALT SERPL W P-5'-P-CCNC: 30 U/L (ref 0–70)
ANION GAP SERPL CALCULATED.3IONS-SCNC: 5 MMOL/L (ref 3–14)
BUN SERPL-MCNC: 14 MG/DL (ref 7–30)
CALCIUM SERPL-MCNC: 10 MG/DL (ref 8.5–10.1)
CHLORIDE SERPL-SCNC: 102 MMOL/L (ref 94–109)
CHOLEST SERPL-MCNC: 150 MG/DL
CO2 SERPL-SCNC: 29 MMOL/L (ref 20–32)
CREAT SERPL-MCNC: 1.09 MG/DL (ref 0.66–1.25)
CREAT UR-MCNC: 65 MG/DL
GFR SERPL CREATININE-BSD FRML MDRD: 66 ML/MIN/1.7M2
GLUCOSE SERPL-MCNC: 356 MG/DL (ref 70–99)
HDLC SERPL-MCNC: 44 MG/DL
LDLC SERPL CALC-MCNC: 74 MG/DL
MICROALBUMIN UR-MCNC: 8 MG/L
MICROALBUMIN/CREAT UR: 13.06 MG/G CR (ref 0–17)
NONHDLC SERPL-MCNC: 106 MG/DL
POTASSIUM SERPL-SCNC: 4.1 MMOL/L (ref 3.4–5.3)
PSA SERPL-ACNC: 2.52 UG/L (ref 0–4)
SODIUM SERPL-SCNC: 136 MMOL/L (ref 133–144)
T4 FREE SERPL-MCNC: 1.04 NG/DL (ref 0.76–1.46)
TRIGL SERPL-MCNC: 158 MG/DL
TSH SERPL DL<=0.005 MIU/L-ACNC: 3.75 MU/L (ref 0.4–4)

## 2017-09-14 PROCEDURE — 82274 ASSAY TEST FOR BLOOD FECAL: CPT | Performed by: FAMILY MEDICINE

## 2017-09-14 NOTE — PROGRESS NOTES
"Please send normal lab letter when labs are complete  NORMAL DIABETES URINE PROTEIN TEST   NORMAL PSA OR PROSTATE CANCER SCREENING TEST,     NORMAL LIVER FUNCTION TEST   NORMAL FREE T4 THYROID HORMONE LEVEL   NORMAL THYROID STIMULATING HORMONE TEST   NORMAL DIABETES URINE PROTEIN TEST    NORMAL GLUCOSE, RENAL AND BLOOD SALTS  EXCEPT VERY HIGH GLUCOSE\  NORMAL TOTAL CHOLESTEROL   BORDERLINE  TRIGLYCERIDES   NORMAL HDL OR \"GOOD\" CHOLESTEROL   NORMAL LDL OR \"BAD\" CHOLESTEROL   NORMAL VERY LOW DENSITY CHOLESTEROL   NORMAL LIVER FUNCTION TEST   NORMAL AND IMPROVED NORMAL PSA OR PROSTATE CANCER SCREENING TEST,   THREE MONTH GLUCOSE AVERAGE  STILL BORDERLINE  POOR CONTROL  CALL OR COME BACK FOR MEDICATION ADJUSTMENT AS SOON AS POSSIBLE   MAKI MORA JR., MD   Current Outpatient Prescriptions:  sitagliptin (JANUVIA) 50 MG tablet  glipiZIDE (GLUCOTROL XL) 10 MG 24 hr tablet  pioglitazone (ACTOS) 15 MG tablet  omeprazole (PRILOSEC) 40 MG capsule  senna-docusate (SENOKOT-S;PERICOLACE) 8.6-50 MG per tablet  tamsulosin (FLOMAX) 0.4 MG capsule  metFORMIN modified (GLUMETZA) 1000 MG 24 hr tablet  finasteride (PROSCAR) 5 MG tablet  traMADol (ULTRAM) 50 MG tablet  fluticasone-salmeterol (ADVAIR) 100-50 MCG/DOSE diskus inhaler  albuterol (PROAIR HFA/PROVENTIL HFA/VENTOLIN HFA) 108 (90 BASE) MCG/ACT Inhaler  valACYclovir (VALTREX) 1000 mg tablet  gabapentin (NEURONTIN) 300 MG capsule  atorvastatin (LIPITOR) 20 MG tablet  losartan (COZAAR) 25 MG tablet  aspirin 81 MG EC tablet  cholecalciferol (VITAMIN D3) 03399 UNITS capsule  acetaminophen 500 MG CAPS  Cyanocobalamin (B-12) 1000 MCG TBCR  order for DME  order for DME  ipratropium - albuterol 0.5 mg/2.5 mg/3 mL (DUONEB) 0.5-2.5 (3) MG/3ML nebulization  blood glucose monitoring (NO BRAND SPECIFIED) meter device kit  blood glucose monitoring (NO BRAND SPECIFIED) test strip  diclofenac (VOLTAREN) 1 % GEL  blood glucose monitoring (ONE TOUCH DELICA) lancets  lidocaine (XYLOCAINE) 5 % " ointment  tadalafil (CIALIS) 5 MG tablet  triamcinolone (KENALOG) 0.1 % paste  FIT BIT TWITCH RECOMMENDED   WALKING IN PLACE AND ARM WIGGLING 15 MINUTES with EACH MEAL   RECHECK THREE MONTH GLUCOSE AVERAGE IN December  MAKI MORA JR., MD     No current facility-administered medications for this visit.

## 2017-09-14 NOTE — PROGRESS NOTES
Please send normal lab letter when labs are complete  RESOLVED PREVIOUS PNEUMONIA  NO ACTIVE LUNG DISEASE AT THIS TIME   MAKI MORA JR., MD

## 2017-09-17 LAB — HEMOCCULT STL QL IA: NEGATIVE

## 2017-09-18 DIAGNOSIS — Z12.11 SPECIAL SCREENING FOR MALIGNANT NEOPLASMS, COLON: ICD-10-CM

## 2017-09-18 NOTE — LETTER
September 20, 2017      Riteshfareed Devlin  [address]   [facility] [address]        Dear ,    We are writing to inform you of your test results.    Your test results fall within the expected range(s) or remain unchanged from previous results.  Please continue with current treatment plan.     NORMAL FECAL COLORECTAL CANCER SCREEN    Resulted Orders   Fecal colorectal cancer screen (FIT)   Result Value Ref Range    Occult Blood Scn FIT Negative NEG^Negative       If you have any questions or concerns, please call the clinic at the number listed above.       Sincerely,        Zack Hudson MD

## 2017-09-20 NOTE — PROGRESS NOTES
Please send normal lab letter when labs are complete  NORMAL FECAL COLORECTAL CANCER SCREEN   MAKI MORA JR., MD

## 2017-09-28 ENCOUNTER — TELEPHONE (OUTPATIENT)
Dept: FAMILY MEDICINE | Facility: CLINIC | Age: 72
End: 2017-09-28

## 2017-09-28 NOTE — TELEPHONE ENCOUNTER
ENLARGING THYROID GLAND LAST OFFICE VISIT  THEREFORE THYROID ULTRASOUND WAS ORDERED  IT SHOWS MULTIPLE COMPLEX CYSTIC NODULE  NEEDS TO BE EVALUATED BY THYROID SPECIALIST DR JASPAL RUIZ MD   THYROID EXPERT   TO BE SURE NO TRANSFORMATION OF THE CYSTS LEFT LOBE OF THYROID  MOST LIKELY TO BE BENIGN; HOWEVER, SHOULD BE EVALUATED FURTHER    MAKI MORA JR., MD

## 2017-09-28 NOTE — LETTER
[facility]  [address]  [address]  [facility] [address]  [phone]                                                                                                           Suze Devlin  [address]   [facility] [address]    September 28, 2017      Dear Suze,      ENLARGING THYROID GLAND LAST OFFICE VISIT    THEREFORE THYROID ULTRASOUND WAS ORDERED    IT SHOWS MULTIPLE COMPLEX CYSTIC NODULE    NEEDS TO BE EVALUATED BY THYROID SPECIALIST DR JASPAL RUIZ MD     THYROID EXPERT     TO BE SURE NO TRANSFORMATION OF THE CYSTS LEFT LOBE OF THYROID    MOST LIKELY TO BE BENIGN; HOWEVER, SHOULD BE EVALUATED FURTHER        MAKI MORA JR., MD     Enclosed is a copy of the results.     Thank you for choosing [facility].  We appreciate the opportunity to serve you and look forward to supporting your healthcare needs in the future.    If you have any questions or concerns, please call me or my staff at [phone].      Sincerely,    Maki Mora Jr MD

## 2017-10-04 DIAGNOSIS — E78.5 HYPERLIPIDEMIA LDL GOAL <100: Chronic | ICD-10-CM

## 2017-10-04 DIAGNOSIS — R14.3 FLATULENCE, ERUCTATION, AND GAS PAIN: ICD-10-CM

## 2017-10-04 DIAGNOSIS — J45.30 MILD PERSISTENT ASTHMA WITHOUT COMPLICATION: Chronic | ICD-10-CM

## 2017-10-04 DIAGNOSIS — J30.1 CHRONIC SEASONAL ALLERGIC RHINITIS DUE TO POLLEN: Primary | ICD-10-CM

## 2017-10-04 DIAGNOSIS — R14.1 FLATULENCE, ERUCTATION, AND GAS PAIN: ICD-10-CM

## 2017-10-04 DIAGNOSIS — I10 HYPERTENSION GOAL BP (BLOOD PRESSURE) < 140/80: Chronic | ICD-10-CM

## 2017-10-04 DIAGNOSIS — R14.2 FLATULENCE, ERUCTATION, AND GAS PAIN: ICD-10-CM

## 2017-10-04 RX ORDER — LOSARTAN POTASSIUM 25 MG/1
25 TABLET ORAL DAILY
Qty: 90 TABLET | Refills: 3 | Status: SHIPPED | OUTPATIENT
Start: 2017-10-04 | End: 2018-12-01

## 2017-10-04 RX ORDER — ATORVASTATIN CALCIUM 20 MG/1
20 TABLET, FILM COATED ORAL DAILY
Qty: 90 TABLET | Refills: 3 | Status: SHIPPED | OUTPATIENT
Start: 2017-10-04 | End: 2018-12-01

## 2017-10-04 NOTE — TELEPHONE ENCOUNTER
Atorvastatin 20 mg      Last Written Prescription Date: 4/18/17  Last Fill Quantity: 90, # refills: 1  Last Office Visit with [facility], [facility] or [facility] prescribing provider: 9/12/17       Lab Results   Component Value Date    CHOL 150 09/12/2017     Lab Results   Component Value Date    HDL 44 09/12/2017     Lab Results   Component Value Date    LDL 74 09/12/2017     Lab Results   Component Value Date    TRIG 158 09/12/2017     Lab Results   Component Value Date    CHOLHDLRATIO 3.1 03/11/2015     Losartan 25 mg       Last Written Prescription Date: 4/14/17   Last Fill Quantity: 90, # refills: 1  Last Office Visit with [facility], [facility] or [facility] prescribing provider: 9/12/17       Potassium   Date Value Ref Range Status   09/12/2017 4.1 3.4 - 5.3 mmol/L Final     Creatinine   Date Value Ref Range Status   09/12/2017 1.09 0.66 - 1.25 mg/dL Final     BP Readings from Last 3 Encounters:   09/12/17 128/68   05/22/17 104/54   05/15/17 110/56         montelukast (SINGULAIR) 10 MG tablet (Discontinued)   Last Written Prescription Date: 10/31/16  Last Fill Quantity: 90, # refills: 3    Last Office Visit with [facility], [facility] or [facility] prescribing provider:  9/12/17  This med has been discontinued    Future Office Visit:       Date of Last Asthma Action Plan Letter:   Asthma Action Plan Q1 Year    Topic Date Due     Asthma Action Plan - yearly  10/31/2017      Asthma Control Test:   ACT Total Scores 5/22/2017   ACT TOTAL SCORE -   ASTHMA ER VISITS -   ASTHMA HOSPITALIZATIONS -   ACT TOTAL SCORE (Goal Greater than or Equal to 20) 12   In the past 12 months, how many times did you visit the emergency room for your asthma without being admitted to the hospital? 0   In the past 12 months, how many times were you hospitalized overnight because of your asthma? 0       Date of Last Spirometry Test:   No results found for this or any previous visit.

## 2017-10-04 NOTE — TELEPHONE ENCOUNTER
Unclear if patient should remain on this or not based on chart review, will need to await PCP review.

## 2017-10-04 NOTE — TELEPHONE ENCOUNTER
Routing refill request to provider for review/approval because:  This med is reported to have been stopped by the patient

## 2017-10-05 RX ORDER — MONTELUKAST SODIUM 10 MG/1
10 TABLET ORAL AT BEDTIME
Qty: 30 TABLET | Refills: 11 | Status: SHIPPED | OUTPATIENT
Start: 2017-10-05 | End: 2017-10-05

## 2017-10-05 RX ORDER — MONTELUKAST SODIUM 10 MG/1
10 TABLET ORAL AT BEDTIME
Qty: 30 TABLET | Refills: 11 | Status: SHIPPED | OUTPATIENT
Start: 2017-10-05 | End: 2018-03-06

## 2017-10-12 ENCOUNTER — TELEPHONE (OUTPATIENT)
Dept: FAMILY MEDICINE | Facility: CLINIC | Age: 72
End: 2017-10-12

## 2017-10-12 NOTE — LETTER
Suze Devlin  [address]    [facility] [address]      [id number]    October 12, 2017      Dear Suze    I am writing you concerning your recent imaging study obtained at the clinic. Your x-ray has been reviewed by the radiologist and the findings are as follows.    CHEST ONE VIEW 9/12/2017 12:46 PM      HISTORY: Encounter for screening for respiratory tuberculosis     COMPARISON: 5/15/2017.         IMPRESSION: The previously seen minimal patchy infiltrate in the left  lung base has resolved. Currently, there is no active cardiopulmonary  disease.     GABI JOHNSON MD    If you have any questions or would like to discuss this further with me please feel free to make an appointment at anytime by calling [phone]. Looking forward to seeing you in the future.      Sincerely,  Zack Hudson MD

## 2017-10-12 NOTE — TELEPHONE ENCOUNTER
Reason for Call:  results    Detailed comments:  Wants xray results mailed to him. dont need to call    Phone Number Patient can be reached at: Home number on file [phone] (home)    Best Time: any    Can we leave a detailed message on this number? YES    Call taken on 10/12/2017 at 10:59 AM by YANETH LAY

## 2017-10-17 NOTE — TELEPHONE ENCOUNTER
Patient's son called back. Letters from September and October including labs and X-ray results put in the mail.

## 2017-10-30 ENCOUNTER — TELEPHONE (OUTPATIENT)
Dept: FAMILY MEDICINE | Facility: CLINIC | Age: 72
End: 2017-10-30

## 2017-10-30 NOTE — TELEPHONE ENCOUNTER
Patient is not checking BS's BID; he does check if he feels ill.  Nurse looked at glucometer readings and BS's are in the 200's up to 354. In the last one month he has checked 5 times.      Verbal ok for continued home care 2x/month for one month,, 3x/month for one month, 3 prns given on behalf of Dr. Zack Hudson

## 2017-11-03 ENCOUNTER — OFFICE VISIT (OUTPATIENT)
Dept: FAMILY MEDICINE | Facility: CLINIC | Age: 72
End: 2017-11-03
Payer: COMMERCIAL

## 2017-11-03 VITALS
HEART RATE: 81 BPM | SYSTOLIC BLOOD PRESSURE: 120 MMHG | DIASTOLIC BLOOD PRESSURE: 64 MMHG | BODY MASS INDEX: 34.87 KG/M2 | WEIGHT: 243 LBS | RESPIRATION RATE: 16 BRPM | TEMPERATURE: 96.2 F | OXYGEN SATURATION: 98 %

## 2017-11-03 DIAGNOSIS — L28.0 NEURODERMATITIS, LOCALIZED: ICD-10-CM

## 2017-11-03 DIAGNOSIS — Z23 NEED FOR PROPHYLACTIC VACCINATION AND INOCULATION AGAINST INFLUENZA: Primary | ICD-10-CM

## 2017-11-03 DIAGNOSIS — E04.1 NONTOXIC UNINODULAR GOITER: ICD-10-CM

## 2017-11-03 PROCEDURE — G0008 ADMIN INFLUENZA VIRUS VAC: HCPCS | Performed by: FAMILY MEDICINE

## 2017-11-03 PROCEDURE — 99214 OFFICE O/P EST MOD 30 MIN: CPT | Mod: 25 | Performed by: FAMILY MEDICINE

## 2017-11-03 PROCEDURE — 90662 IIV NO PRSV INCREASED AG IM: CPT | Performed by: FAMILY MEDICINE

## 2017-11-03 RX ORDER — TRIAMCINOLONE ACETONIDE 1 MG/G
CREAM TOPICAL
Qty: 80 G | Refills: 11 | Status: SHIPPED | OUTPATIENT
Start: 2017-11-03 | End: 2019-05-09

## 2017-11-03 NOTE — PATIENT INSTRUCTIONS
STOP TRAMADOL AND GABAPENTIN  SEE DR DUNCAN FOR LEFT SIDED THYROID NODULE  MAKI MORA JR., MD   (Z23) Need for prophylactic vaccination and inoculation against influenza  (primary encounter diagnosis)  Comment:    Plan: FLU VACCINE, INCREASED ANTIGEN, PRESV FREE, AGE        65+ [59909], ADMIN INFLUENZA (For MEDICARE         Patients ONLY) []             (E04.1) Nontoxic uninodular goiter  Comment: MULTINODULAR GOITER NON TOXIC  Plan: ENDOCRINOLOGY ADULT REFERRAL  LEFT SIDED APPARENTLY BENIGN  PRESENT SINCE 2008             (L28.0) Neurodermatitis, localized  Comment:    Plan: triamcinolone (KENALOG) 0.1 % cream TWICE DAILY             COVER WITH VASELINE

## 2017-11-03 NOTE — MR AVS SNAPSHOT
After Visit Summary   11/3/2017    Suze Devlin    MRN: [id number]           Patient Information     Date Of Birth          1945        Visit Information        Provider Department      11/3/2017 10:30 AM Zack Mora MD; [employer] [facility]        Today's Diagnoses     Need for prophylactic vaccination and inoculation against influenza    -  1    Nontoxic uninodular goiter        Neurodermatitis, localized          Care Instructions    STOP TRAMADOL AND GABAPENTIN  SEE DR DUNCAN FOR LEFT SIDED THYROID NODULE  ZACK MORA JR., MD   (Z23) Need for prophylactic vaccination and inoculation against influenza  (primary encounter diagnosis)  Comment:    Plan: FLU VACCINE, INCREASED ANTIGEN, PRESV FREE, AGE        65+ [47632], ADMIN INFLUENZA (For MEDICARE         Patients ONLY) []             (E04.1) Nontoxic uninodular goiter  Comment: MULTINODULAR GOITER NON TOXIC  Plan: ENDOCRINOLOGY ADULT REFERRAL  LEFT SIDED APPARENTLY BENIGN  PRESENT SINCE 2008             (L28.0) Neurodermatitis, localized  Comment:    Plan: triamcinolone (KENALOG) 0.1 % cream TWICE DAILY             COVER WITH VASELINE                Follow-ups after your visit        Additional Services     ENDOCRINOLOGY ADULT REFERRAL       Your provider has referred you to:  [facility]  Directions  Website  Address: [address] [address]  PLEASE EVALUATE AND TREAT  PRESENT SINCE 2008 GETTING WORSE  Phone: [phone]        Please be aware that coverage of these services is subject to the terms and limitations of your health insurance plan.  Call member services at your health plan with any benefit or coverage questions.      Please bring the following to your appointment:    >>   Any x-rays, CTs or MRIs which have been performed.  Contact the facility where they were done to arrange for  " prior to your scheduled appointment.    >>   List of current medications   >>   This referral request   >>   Any documents/labs given to you for this referral                  Follow-up notes from your care team     Return in about 2 weeks (around 11/17/2017).      Your next 10 appointments already scheduled     Nov 09, 2017  9:30 AM CST   [facility] with Zack Hudson MD   [facility]    [address] [place] [id number] [phone]              Who to contact     If you have questions or need follow up information about today's clinic visit or your schedule please contact [facility] [place] directly at [phone].  Normal or non-critical lab and imaging results will be communicated to you by MyChart, letter or phone within 4 business days after the clinic has received the results. If you do not hear from us within 7 days, please contact the clinic through MyChart or phone. If you have a critical or abnormal lab result, we will notify you by phone as soon as possible.  Submit refill requests through [facility] or call your pharmacy and they will forward the refill request to us. Please allow 3 business days for your refill to be completed.          Additional Information About Your Visit        [facility] Information     [facility] lets you send messages to your doctor, view your test results, renew your prescriptions, schedule appointments and more. To sign up, go to [email or web address] . Click on \"Log in\" on the left side of the screen, which will take you to the Welcome page. Then click on \"Sign up Now\" on the right side of the page.     You will be asked to enter the access code listed below, as well as some personal information. Please follow the directions to create your username and password.     Your access code is: [email or web address]  Expires: 12/11/2017 12:30 PM   "   Your access code will  in 90 days. If you need help or a new code, please call your [facility] clinic or [phone].        Care EveryWhere ID     This is your Care EveryWhere ID. This could be used by other organizations to access your [facility] medical records  [id number]        Your Vitals Were     Pulse Temperature Respirations Pulse Oximetry BMI (Body Mass Index)       81 96.2  F (35.7  C) (Tympanic) 16 98% 34.87 kg/m2        Blood Pressure from Last 3 Encounters:   17 120/64   17 128/68   17 104/54    Weight from Last 3 Encounters:   17 243 lb (110.2 kg)   17 243 lb (110.2 kg)   17 247 lb (112 kg)              We Performed the Following     ADMIN INFLUENZA (For MEDICARE Patients ONLY) []     Asthma Action Plan (AAP)     ENDOCRINOLOGY ADULT REFERRAL     FLU VACCINE, INCREASED ANTIGEN, PRESV FREE, AGE 65+ [60989]          Today's Medication Changes          These changes are accurate as of: 11/3/17 11:29 AM.  If you have any questions, ask your nurse or doctor.               Start taking these medicines.        Dose/Directions    triamcinolone 0.1 % cream   Commonly known as:  KENALOG   Used for:  Neurodermatitis, localized   Started by:  Zack Hudson MD        Apply sparingly to affected area three times daily as needed   Quantity:  80 g   Refills:  11            Where to get your medicines      These medications were sent to [address] [address]     Phone:  [phone]     triamcinolone 0.1 % cream                Primary Care Provider Office Phone # Fax #    Zack Hudson [phone] [address] [facility] [address]        Equal Access to Services     CHRISTIANO FRANCIS : Brian arthuro Sobrian, waaxda luqadaha, qaybta kaalmada adeegyada, waxay damian shaw. [facility] [phone].    ATENCIÓN:  Si habla peyton, tiene a wells disposición servicios gratuitos de asistencia lingüística. Glenna fermin [phone].    We comply with applicable federal civil rights laws and [place] laws. We do not discriminate on the basis of race, color, national origin, age, disability, sex, sexual orientation, or gender identity.            Thank you!     Thank you for choosing [facility]  for your care. Our goal is always to provide you with excellent care. Hearing back from our patients is one way we can continue to improve our services. Please take a few minutes to complete the written survey that you may receive in the mail after your visit with us. Thank you!             Your Updated Medication List - Protect others around you: Learn how to safely use, store and throw away your medicines at www.disposemymeds.org.          This list is accurate as of: 11/3/17 11:29 AM.  Always use your most recent med list.                   Brand Name Dispense Instructions for use Diagnosis    acetaminophen 500 MG Caps     120 capsule    Take 1 tablet by mouth 4 times daily    Primary osteoarthritis of both knees       albuterol 108 (90 BASE) MCG/ACT Inhaler    PROAIR HFA/PROVENTIL HFA/VENTOLIN HFA    1 Inhaler    Inhale 2 puffs into the lungs 4 times daily as needed    Wheezing, Mild intermittent asthma with acute exacerbation       aspirin 81 MG EC tablet     90 tablet    Take 1 tablet (81 mg) by mouth daily    Hyperlipidemia LDL goal <100       atorvastatin 20 MG tablet    LIPITOR    90 tablet    Take 1 tablet (20 mg) by mouth daily    Hyperlipidemia LDL goal <100, Flatulence, eructation, and gas pain       B-12 1000 MCG Tbcr     90 tablet    Take 1,000 mcg by mouth daily    Vitamin B 12 deficiency       blood glucose monitoring lancets     3 Box    Use to test blood sugars  TWICE DAILY    or as directed.    Type 2 diabetes mellitus without complication (H)       blood glucose monitoring meter device kit     no brand specified    1 kit    Use to test blood sugar two times daily or as directed.    Type 2 diabetes mellitus with diabetic nephropathy (H)       blood glucose monitoring test strip    no brand specified    100 each    Use to test blood sugars two times daily or as directed    Type 2 diabetes mellitus with diabetic nephropathy (H)       cholecalciferol 27580 UNITS capsule    VITAMIN D3    4 capsule    Take 1 capsule (50,000 Units) by mouth once a week    Vitamin D insufficiency       diclofenac 1 % Gel topical gel    VOLTAREN    100 g    Apply 4 grams to knees or 2 grams to hands four times daily using enclosed dosing card.    Primary osteoarthritis of left knee       finasteride 5 MG tablet    PROSCAR    90 tablet    Take 1 tablet (5 mg) by mouth daily    BPH associated with nocturia       fluticasone-salmeterol 100-50 MCG/DOSE diskus inhaler    ADVAIR    1 Inhaler    Inhale 1 puff into the lungs 2 times daily    Acute bronchitis, unspecified organism       glipiZIDE 10 MG 24 hr tablet    GLUCOTROL XL    90 tablet    Take 1 tablet (10 mg) by mouth daily    Type 2 diabetes mellitus with diabetic nephropathy, without long-term current use of insulin (H)       ipratropium - albuterol 0.5 mg/2.5 mg/3 mL 0.5-2.5 (3) MG/3ML neb solution    DUONEB    2.5 mL    Take 1 vial (3 mLs) by nebulization every 6 hours as needed for shortness of breath / dyspnea or wheezing    Acute bronchitis due to other specified organisms, Wheezing, Mild intermittent asthma with acute exacerbation       lidocaine 5 % ointment    XYLOCAINE    300 g    One application 4 x daily to affected areas lower back and knees if necessary    Midline low back pain with sciatica, sciatica laterality unspecified       losartan 25 MG tablet    COZAAR    90 tablet    Take 1 tablet (25 mg) by mouth daily    Hypertension goal BP (blood pressure) < 140/80       metFORMIN modified 1000 MG 24 hr tablet    GLUMETZA    30 tablet    Take 1 tablet (1,000 mg) by  mouth daily (with dinner)    Slow transit constipation       montelukast 10 MG tablet    SINGULAIR    30 tablet    Take 1 tablet (10 mg) by mouth At Bedtime    Chronic seasonal allergic rhinitis due to pollen, Mild persistent asthma without complication       omeprazole 40 MG capsule    priLOSEC    91 capsule    Take 1 capsule (40 mg) by mouth daily Take 30-60 minutes before a meal.    Flatulence, eructation and gas pain, Gastroesophageal reflux disease without esophagitis       * order for DME     1 each    Equipment being ordered: Nebulizer ONE USE Q 4 HOURS PRN EXACERBATION OF ASTHMA ML ALBUTEROL 2.5MG PER 3ML  (J45.20) Mild intermittent asthma without complication  (primary encounter diagnosis)  MAKI MORA JR., MD    Mild intermittent asthma without complication       * order for DME     400 each    Lancets.  Four times daily and prn. Fill per patient's insurance.   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)       senna-docusate 8.6-50 MG per tablet    SENOKOT-S;PERICOLACE    120 tablet    Take 1 tablet by mouth 2 times daily    Slow transit constipation       sitagliptin 50 MG tablet    JANUVIA    90 tablet    Take 1 tablet (50 mg) by mouth daily    Type 2 diabetes mellitus with diabetic nephropathy, without long-term current use of insulin (H)       tadalafil 5 MG tablet    CIALIS    90 tablet    Take 1 tablet (5 mg) by mouth daily Never use with nitroglycerin, terazosin or doxazosin.    Sexual dysfunction       tamsulosin 0.4 MG capsule    FLOMAX    90 capsule    Take 1 capsule (0.4 mg) by mouth daily    Nocturia       triamcinolone 0.1 % cream    KENALOG    80 g    Apply sparingly to  affected area three times daily as needed    Neurodermatitis, localized       triamcinolone 0.1 % paste    KENALOG    5 g    Take by mouth 2 times daily    Lesion of lip       valACYclovir 1000 mg tablet    VALTREX    21 tablet    Take 1 tablet (1,000 mg) by mouth 3 times daily    Varicella-zoster virus infection       * Notice:  This list has 2 medication(s) that are the same as other medications prescribed for you. Read the directions carefully, and ask your doctor or other care provider to review them with you.

## 2017-11-03 NOTE — NURSING NOTE
"Chief Complaint   Patient presents with     Thyroid Problem     THROAT SWELLING       Initial /64  Pulse 81  Temp 96.2  F (35.7  C) (Tympanic)  Resp 16  Wt 243 lb (110.2 kg)  SpO2 98%  BMI 34.87 kg/m2 Estimated body mass index is 34.87 kg/(m^2) as calculated from the following:    Height as of 5/15/17: 5' 10\" (1.778 m).    Weight as of this encounter: 243 lb (110.2 kg).  Medication Reconciliation: complete   Katina Marie CMA    "

## 2017-11-03 NOTE — PROGRESS NOTES
SUBJECTIVE:   Suze Devlin is a 72 year old male who presents to clinic today for the following health issues:      THROAT PAIN      Duration: FEW MONTHS    Description (location/character/radiation): LEFT SIDE OF THROAT    Intensity:  moderate    Accompanying signs and symptoms: GLAND FEELS SWOLLEN    History (similar episodes/previous evaluation): YES    Precipitating or alleviating factors: HAS BEEN REFERRED TO A THYROID dR. HAS NOT GONE YET    Therapies tried and outcome: None       Patient had ultrasound done already     Indeterminate nodule     With worsening pain     Did not see Dr Pérez yet     Resent referral     With laboratory tests and letter       .MAKI MORA MD .11/3/2017 12:25 PM .November 3, 2017    Suze Devlin is a 72 year old male who is who presents with follow up  On thyroid nodule   Onset : ongoing    Severity: moderate     Home treatments  NOT APPLICABLE    Additional Symptoms: LOCALZIED PAIN   Course ONGOING SYMPTOMS   MIGHT DO A SCAN NEXT   NORMAL T4 AND TSH     ASTHMA BETTER CONTROL  DIABETES 2 GOAL 8% WELL CONTROLLED CURRENT REGIMEN   CONSTIPATION IMPROVED   VITAMIN D REPLACEMENT      .  Current Outpatient Prescriptions   Medication Sig Dispense Refill     triamcinolone (KENALOG) 0.1 % cream Apply sparingly to affected area three times daily as needed 80 g 11     montelukast (SINGULAIR) 10 MG tablet Take 1 tablet (10 mg) by mouth At Bedtime 30 tablet 11     atorvastatin (LIPITOR) 20 MG tablet Take 1 tablet (20 mg) by mouth daily 90 tablet 3     losartan (COZAAR) 25 MG tablet Take 1 tablet (25 mg) by mouth daily 90 tablet 3     sitagliptin (JANUVIA) 50 MG tablet Take 1 tablet (50 mg) by mouth daily 90 tablet 1     glipiZIDE (GLUCOTROL XL) 10 MG 24 hr tablet Take 1 tablet (10 mg) by mouth daily 90 tablet 1     pioglitazone (ACTOS) 15 MG tablet Take 1 tablet (15 mg) by mouth daily 90 tablet 1     omeprazole (PRILOSEC) 40 MG capsule Take 1 capsule (40 mg) by mouth  daily Take 30-60 minutes before a meal. 91 capsule 3     senna-docusate (SENOKOT-S;PERICOLACE) 8.6-50 MG per tablet Take 1 tablet by mouth 2 times daily 120 tablet 11     tamsulosin (FLOMAX) 0.4 MG capsule Take 1 capsule (0.4 mg) by mouth daily 90 capsule 3     metFORMIN modified (GLUMETZA) 1000 MG 24 hr tablet Take 1 tablet (1,000 mg) by mouth daily (with dinner) 30 tablet 11     finasteride (PROSCAR) 5 MG tablet Take 1 tablet (5 mg) by mouth daily 90 tablet 3     fluticasone-salmeterol (ADVAIR) 100-50 MCG/DOSE diskus inhaler Inhale 1 puff into the lungs 2 times daily 1 Inhaler 3     albuterol (PROAIR HFA/PROVENTIL HFA/VENTOLIN HFA) 108 (90 BASE) MCG/ACT Inhaler Inhale 2 puffs into the lungs 4 times daily as needed 1 Inhaler 11     valACYclovir (VALTREX) 1000 mg tablet Take 1 tablet (1,000 mg) by mouth 3 times daily 21 tablet 0     aspirin 81 MG EC tablet Take 1 tablet (81 mg) by mouth daily 90 tablet 3     cholecalciferol (VITAMIN D3) 44303 UNITS capsule Take 1 capsule (50,000 Units) by mouth once a week 4 capsule 11     acetaminophen 500 MG CAPS Take 1 tablet by mouth 4 times daily 120 capsule 11     Cyanocobalamin (B-12) 1000 MCG TBCR Take 1,000 mcg by mouth daily 90 tablet 2     order for DME Lancets.  Four times daily and prn. Fill per patient's insurance.      Microlet lancets 400 each 1     order for DME Equipment being ordered: Nebulizer  ONE  USE Q 4 HOURS PRN EXACERBATION OF ASTHMA ML  ALBUTEROL 2.5MG PER 3ML   (J45.20) Mild intermittent asthma without complication  (primary encounter diagnosis)    MAKI MORA JR., MD 1 each 1     ipratropium - albuterol 0.5 mg/2.5 mg/3 mL (DUONEB) 0.5-2.5 (3) MG/3ML nebulization Take 1 vial (3 mLs) by nebulization every 6 hours as needed for shortness of breath / dyspnea or wheezing 2.5 mL o     blood glucose monitoring (NO BRAND SPECIFIED) meter device kit Use to test blood sugar two times daily or as directed. 1 kit 0     blood glucose monitoring (NO BRAND  SPECIFIED) test strip Use to test blood sugars two times daily or as directed 100 each 4     diclofenac (VOLTAREN) 1 % GEL Apply 4 grams to knees or 2 grams to hands four times daily using enclosed dosing card. 100 g 1     blood glucose monitoring (ONE TOUCH DELICA) lancets Use to test blood sugars  TWICE DAILY    or as directed. 3 Box prn     lidocaine (XYLOCAINE) 5 % ointment One application 4 x daily to affected areas lower back and knees if necessary 300 g 0     tadalafil (CIALIS) 5 MG tablet Take 1 tablet (5 mg) by mouth daily Never use with nitroglycerin, terazosin or doxazosin. 90 tablet 0     triamcinolone (KENALOG) 0.1 % paste Take by mouth 2 times daily 5 g 11        No Known Allergies    Immunization History   Administered Date(s) Administered     Influenza (High Dose) 3 valent vaccine 10/03/2014, 10/13/2015, 11/03/2017     Pneumococcal 23 valent 10/17/2014     TD (ADULT, 7+) 04/04/2005, 02/03/2006, 08/08/2006     TDAP Vaccine (Adacel) 10/13/2015     Varicella 04/04/2005, 02/03/2006         reports that he does not drink alcohol.      reports that he does not use illicit drugs.    family history includes Unknown/Adopted in his mother.    indicated that the status of his mother is unknown. He indicated that all of his four daughters are alive. He indicated that all of his five sons are alive.      has a past surgical history that includes hernia repair, inguinal rt/lt.     reports that he does not engage in sexual activity.  .  Pediatric History   Patient Guardian Status     Not on file.     Other Topics Concern     Parent/Sibling W/ Cabg, Mi Or Angioplasty Before 65f 55m? No     Social History Narrative         reports that he has never smoked. He has never used smokeless tobacco.    Medical, social, surgical, and family histories reviewed.    Labs reviewed in EPIC  Patient Active Problem List   Diagnosis     Health Care Home     Cataract     Elevated prostate specific antigen (PSA)      Psychophysiological insomnia     Vitamin D deficiency     Hypertrophy of prostate without urinary obstruction     Memory loss     Anxiety state     Gastroesophageal reflux disease without esophagitis     Primary localized osteoarthrosis, lower leg     Vitamin D insufficiency     Chronic idiopathic constipation     Vitamin B 12 deficiency     Hypertension goal BP (blood pressure) < 140/80     Chronic fatigue     Hyperlipidemia LDL goal <100     GERD (gastroesophageal reflux disease)     Mild persistent asthma without complication     Diabetic neuropathy (H)     Lumbago     ACP (advance care planning)     Type 2 diabetes mellitus without complication, without long-term current use of insulin (H)     Mild intermittent asthma without complication     Screening examination for pulmonary tuberculosis     Past Surgical History:   Procedure Laterality Date     HERNIA REPAIR, INGUINAL RT/LT         Social History   Substance Use Topics     Smoking status: Never Smoker     Smokeless tobacco: Never Used     Alcohol use No     Family History   Problem Relation Age of Onset     Unknown/Adopted Mother      medical history of family is unknown.         No Known Allergies  Recent Labs   Lab Test  09/12/17   1227  05/15/17   1209  10/31/16   1459  03/11/16   1000   03/11/15   1054   A1C  8.0*  8.0*  6.7*  9.7*   < >  7.9*   LDL  74   --   85  135*   --   78   HDL  44   --   47  55   --   49   TRIG  158*   --   146  122   --   134   ALT  30   --   28   --    --   19   CR  1.09   --   1.04  1.00   < >  1.00   GFRESTIMATED  66   --   70  73   < >  74   GFRESTBLACK  80   --   85  89   < >  89   POTASSIUM  4.1   --   4.4  5.3   < >  4.2   TSH  3.75   --    --   4.36   --    --     < > = values in this interval not displayed.        BP Readings from Last 6 Encounters:   11/03/17 120/64   09/12/17 128/68   05/22/17 104/54   05/15/17 110/56   05/08/17 116/56   03/10/17 114/60       Wt Readings from Last 3 Encounters:   11/03/17 243 lb  (110.2 kg)   09/12/17 243 lb (110.2 kg)   05/22/17 247 lb (112 kg)         Positive symptoms or findings indicated by bold designation:     ROS: 10 point ROS neg other than the symptoms noted above in the HPI.except  has Health Care Home; Cataract; Elevated prostate specific antigen (PSA); Psychophysiological insomnia; Vitamin D deficiency; Hypertrophy of prostate without urinary obstruction; Memory loss; Anxiety state; Gastroesophageal reflux disease without esophagitis; Primary localized osteoarthrosis, lower leg; Vitamin D insufficiency; Chronic idiopathic constipation; Vitamin B 12 deficiency; Hypertension goal BP (blood pressure) < 140/80; Chronic fatigue; Hyperlipidemia LDL goal <100; GERD (gastroesophageal reflux disease); Mild persistent asthma without complication; Diabetic neuropathy (H); Lumbago; ACP (advance care planning); Type 2 diabetes mellitus without complication, without long-term current use of insulin (H); Mild intermittent asthma without complication; and Screening examination for pulmonary tuberculosis on his problem list.   Constitutional: The patient denied fatigue, fever, insomnia, night sweats, recent illness and weight loss.  WEIGHT STABLE 40 POUNDS OVER WEIGHT     Eyes: The patient denied blindness, eye pain, eye tearing, photophobia, vision change and visual disturbance. NORMAL VISION   HISTORY OF CATARACTS     Ears/Nose/Throat/Neck: The patient denied dizziness, facial pain, hearing loss, nasal discharge, oral pain, otalgia, postnasal drip, sinus congestion, sore throat, tinnitus and voice change.       Cardiovascular: The patient denied arrhythmia, chest pain/pressure, claudication, edema, exercise intolerance, fatigue, orthopnea, palpitations and syncope.      Respiratory: The patient denied asthma, chest congestion, cough, dyspnea on exertion, dyspnea/shortness of breath, hemoptysis, pedal edema, pleuritic pain, productive sputum, snoring and wheezing.   ASTHMA BETTER CONTROL NOTED      Gastrointestinal: The patient denied abdominal pain, anorexia, constipation, diarrhea, dysphagia, gastroesophageal reflux, hematochezia, hemorrhoids, melena, nausea and vomiting . CONSTIPATION AND GASTROESOPHAGEAL REFLUX DISEASE WITHOUT ESOPHAGITIS     Genitourinary/Nephrology: The patient denied breast complaint, dysuria, nocturia sexual dysfunction, t, urinary frequency, urinary incontinence, urinary urgency    HISTORY OF ELEVATED PSA TEST     Musculoskeletal: The patient denied arthralgia(s), back pain, joint complaint, muscle weakness, myalgias, osteoporosis, sciatica, stiffness and swelling.  CHRONIC LOWER BACK PAIN     Dermatoligic:: The patient denied acne, dermatitis, ecchymosis, itching, mole change, rash, skin cancer, skin lesion and sores.  NEURODERMATITIS LOWER RIGHT LEG     Neurologic: The patient denied dizziness, gait abnormality, headache, memory loss, mental status change, paresis, paresthesia, seizure, syncope, tremor and vision change.     Psychiatric: The patient denied anxiety, depression, disturbances of memory, drug abuse, insomnia, mood swings and relationship difficulties.      Endocrine: The patient denied , goiter, obesity, polyuria and thyroid disease.      Hematologic/Lymphatic: The patient denied abnormal bleeding and bruising, abnormal ecchymoses, anemia, lymph node enlargement/mass, petechiae and venous  Thrombosis.      Allergy/Immunology: The patient denied food allergy and  Allergic rhinitis or conjunctivitis.        PE:  /64  Pulse 81  Temp 96.2  F (35.7  C) (Tympanic)  Resp 16  Wt 243 lb (110.2 kg)  SpO2 98%  BMI 34.87 kg/m2 Body mass index is 34.87 kg/(m^2).    Constitutional: general appearance, well nourished, well developed, in no acute distress, well developed, appears stated age, normal body habitus,      Eyes:; The patient has normal eyelids sclerae and conjunctivae : NORMAL      Ears/Nose/Throat: external ear, overall: normal appearance; external nose,  overall: benign appearance, normal moujth gums and lips  The patient has: NORMAL     Neck: thyroid, overall: normal size, normal consistency, nontender,  NORMAL     Respiratory:  palpation of chest, overall: normal excursion, NORMAL   Clear to percussion and auscultation  NORMAL     Tachypnea  N Color left     Cardiovascular:  Good color with no peripheral edema  NORMAL    Regular sinus rhythm without murmur. Physiologic heart sounds Heart is unelarged  .   Chest/Breast: normal shape  NORMAL      Abdominal exam,  Liver and spleen are  unenlarged NORMAL       Tenderness  NORMAL   Scars   NORMAL     Urogenital; no renal, flank or bladder  tenderness;  NORMAL     Lymphatic: neck nodes,  NORMAL    Other nodes NORMAL     Musculoskeletal:  Brief ortho exam normal except:  LOWER BACK PAIN DECREASE RANGE OF MOTION   NEGATIVE STRAIGHT LEG RAISING TEST     Integument: inspection of skin, no rash, lesions; and, palpation, no induration, no tenderness.      Neurologic mental status, overall: alert and oriented; gait, no ataxia, no unsteadiness; coordination, no tremors; cranial nerves, overall: normal motor, overall: normal bulk, tone.      Psychiatric: orientation/consciousness, overall: oriented to person, place and time; behavior/psychomotor activity, no tics, normal psychomotor activity; mood and affect, overall: normal mood and affect; appearance, overall: well-groomed, good eye contact; speech, overall: normal quality, no aphasia and normal quality, quantity, intact.      Diagnostic Test Results:  Results for orders placed or performed in visit on 09/18/17   Fecal colorectal cancer screen (FIT)   Result Value Ref Range    Occult Blood Scn FIT Negative NEG^Negative         ICD-10-CM    1. Need for prophylactic vaccination and inoculation against influenza Z23 FLU VACCINE, INCREASED ANTIGEN, PRESV FREE, AGE 65+ [53935]     ADMIN INFLUENZA (For MEDICARE Patients ONLY) []   2. Nontoxic uninodular goiter E04.1  ENDOCRINOLOGY ADULT REFERRAL    MULTINODULAR GOITER NON TOXIC   3. Neurodermatitis, localized L28.0 triamcinolone (KENALOG) 0.1 % cream        .  Side effects benefits and risks thoroughly discussed. .he may come in early if unimproved or getting worse      Importance of adhering to regimen discussed and if medications were dispensed, the importance of taking medications discussed and bringing in the medications after every visit for chronic problems     Please drink 2 glasses of water prior to meals and walk 15-30 minutes after meals    I spent  25 MINUTES SPENT  with patient discussing the following issues   The primary encounter diagnosis was Need for prophylactic vaccination and inoculation against influenza. Diagnoses of Nontoxic uninodular goiter and Neurodermatitis, localized were also pertinent to this visit. over half of which involved counseling and coordination of care.    Patient Instructions   STOP TRAMADOL AND GABAPENTIN  SEE DR DUNCAN FOR LEFT SIDED THYROID NODULE  MAKI MORA JR., MD   (Z23) Need for prophylactic vaccination and inoculation against influenza  (primary encounter diagnosis)  Comment:    Plan: FLU VACCINE, INCREASED ANTIGEN, PRESV FREE, AGE        65+ [45725], ADMIN INFLUENZA (For MEDICARE         Patients ONLY) []             (E04.1) Nontoxic uninodular goiter  Comment: MULTINODULAR GOITER NON TOXIC  Plan: ENDOCRINOLOGY ADULT REFERRAL  LEFT SIDED APPARENTLY BENIGN  PRESENT SINCE 2008             (L28.0) Neurodermatitis, localized  Comment:    Plan: triamcinolone (KENALOG) 0.1 % cream TWICE DAILY             COVER WITH VASELINE            ALL THE ABOVE PROBLEMS ARE STABLE AND MED CHANGES AS NOTED    Diet:  MEDITERRANEAN DIET     Exercise:  LOWER BACK PAIN   Exercises Range of motion, balance, isometric, and strengthening exercises 30 repetitions twice daily of involved joints      .MAKI MORA MD 11/3/2017 12:25 PM  November 3, 2017             Injectable Influenza  Immunization Documentation    1.  Is the person to be vaccinated sick today?   No    2. Does the person to be vaccinated have an allergy to a component   of the vaccine?   No  Egg Allergy Algorithm Link    3. Has the person to be vaccinated ever had a serious reaction   to influenza vaccine in the past?   No    4. Has the person to be vaccinated ever had Guillain-Barré syndrome?   No    Form completed by lme

## 2017-11-03 NOTE — LETTER
My Asthma Action Plan  Name: Suze Devlin   YOB: 1945  Date: 11/3/2017   My doctor: MAKI MORA MD   My clinic: [facility]        My Control Medicine: { :773125}  My Rescue Medicine: { :690374}  {AAP include Oral Steroid:493038} My Asthma Severity: { :692110}  Avoid your asthma triggers: { :774960}  unsure     {Is patient a child or adult?:581995}       GREEN ZONE   Good Control    I feel good    No cough or wheeze    Can work, sleep and play without asthma symptoms       Take your asthma control medicine every day.     1. If exercise triggers your asthma, take your rescue medication    15 minutes before exercise or sports, and    During exercise if you have asthma symptoms  2. Spacer to use with inhaler: If you have a spacer, make sure to use it with your inhaler             YELLOW ZONE Getting Worse  I have ANY of these:    I do not feel good    Cough or wheeze    Chest feels tight    Wake up at night   1. Keep taking your Green Zone medications  2. Start taking your rescue medicine:    every 20 minutes for up to 1 hour. Then every 4 hours for 24-48 hours.  3. If you stay in the Yellow Zone for more than 12-24 hours, contact your doctor.  4. If you do not return to the Green Zone in 12-24 hours or you get worse, start taking your oral steroid medicine if prescribed by your provider.           RED ZONE Medical Alert - Get Help  I have ANY of these:    I feel awful    Medicine is not helping    Breathing getting harder    Trouble walking or talking    Nose opens wide to breathe       1. Take your rescue medicine NOW  2. If your provider has prescribed an oral steroid medicine, start taking it NOW  3. Call your doctor NOW  4. If you are still in the Red Zone after 20 minutes and you have not reached your doctor:    Take your rescue medicine again and    Call 911 or go to the emergency room right away    See your regular doctor within 2 weeks of an  Emergency Room or Urgent Care visit for follow-up treatment.        Electronically signed by: Katina Marie, November 3, 2017    Annual Reminders:  Meet with Asthma Educator,  Flu Shot in the Fall, consider Pneumonia Vaccination for patients with asthma (aged 19 and older).    Pharmacy: [facility] - [place] [id number]                    Asthma Triggers  How To Control Things That Make Your Asthma Worse    Triggers are things that make your asthma worse.  Look at the list below to help you find your triggers and what you can do about them.  You can help prevent asthma flare-ups by staying away from your triggers.      Trigger                                                          What you can do   Cigarette Smoke  Tobacco smoke can make asthma worse. Do not allow smoking in your home, car or around you.  Be sure no one smokes at a child s day care or school.  If you smoke, ask your health care provider for ways to help you quit.  Ask family members to quit too.  Ask your health care provider for a referral to Quit Plan to help you quit smoking, or call 4-109-836-PLAN.     Colds, Flu, Bronchitis  These are common triggers of asthma. Wash your hands often.  Don t touch your eyes, nose or mouth.  Get a flu shot every year.     Dust Mites  These are tiny bugs that live in cloth or carpet. They are too small to see. Wash sheets and blankets in hot water every week.   Encase pillows and mattress in dust mite proof covers.  Avoid having carpet if you can. If you have carpet, vacuum weekly.   Use a dust mask and HEPA vacuum.   Pollen and Outdoor Mold  Some people are allergic to trees, grass, or weed pollen, or molds. Try to keep your windows closed.  Limit time out doors when pollen count is high.   Ask you health care provider about taking medicine during allergy season.     Animal Dander  Some people are allergic to skin flakes, urine or saliva from pets with fur or feathers. Keep  pets with fur or feathers out of your home.    If you can t keep the pet outdoors, then keep the pet out of your bedroom.  Keep the bedroom door closed.  Keep pets off cloth furniture and away from stuffed toys.     Mice, Rats, and Cockroaches  Some people are allergic to the waste from these pests.   Cover food and garbage.  Clean up spills and food crumbs.  Store grease in the refrigerator.   Keep food out of the bedroom.   Indoor Mold  This can be a trigger if your home has high moisture. Fix leaking faucets, pipes, or other sources of water.   Clean moldy surfaces.  Dehumidify basement if it is damp and smelly.   Smoke, Strong Odors, and Sprays  These can reduce air quality. Stay away from strong odors and sprays, such as perfume, powder, hair spray, paints, smoke incense, paint, cleaning products, candles and new carpet.   Exercise or Sports  Some people with asthma have this trigger. Be active!  Ask your doctor about taking medicine before sports or exercise to prevent symptoms.    Warm up for 5-10 minutes before and after sports or exercise.     Other Triggers of Asthma  Cold air:  Cover your nose and mouth with a scarf.  Sometimes laughing or crying can be a trigger.  Some medicines and food can trigger asthma.

## 2017-11-04 ASSESSMENT — ASTHMA QUESTIONNAIRES: ACT_TOTALSCORE: 22

## 2017-11-17 ENCOUNTER — OFFICE VISIT (OUTPATIENT)
Dept: FAMILY MEDICINE | Facility: CLINIC | Age: 72
End: 2017-11-17
Payer: COMMERCIAL

## 2017-11-17 VITALS
SYSTOLIC BLOOD PRESSURE: 126 MMHG | WEIGHT: 245 LBS | HEART RATE: 83 BPM | BODY MASS INDEX: 35.15 KG/M2 | DIASTOLIC BLOOD PRESSURE: 60 MMHG | RESPIRATION RATE: 20 BRPM | OXYGEN SATURATION: 100 % | TEMPERATURE: 97.5 F

## 2017-11-17 DIAGNOSIS — E11.9 TYPE 2 DIABETES MELLITUS WITHOUT COMPLICATION, WITHOUT LONG-TERM CURRENT USE OF INSULIN (H): Primary | Chronic | ICD-10-CM

## 2017-11-17 DIAGNOSIS — K59.01 SLOW TRANSIT CONSTIPATION: ICD-10-CM

## 2017-11-17 LAB — HBA1C MFR BLD: 8 % (ref 4.3–6)

## 2017-11-17 PROCEDURE — 36415 COLL VENOUS BLD VENIPUNCTURE: CPT | Performed by: FAMILY MEDICINE

## 2017-11-17 PROCEDURE — 83036 HEMOGLOBIN GLYCOSYLATED A1C: CPT | Performed by: FAMILY MEDICINE

## 2017-11-17 PROCEDURE — 99214 OFFICE O/P EST MOD 30 MIN: CPT | Performed by: FAMILY MEDICINE

## 2017-11-17 RX ORDER — AMOXICILLIN 250 MG
2 CAPSULE ORAL 2 TIMES DAILY
Qty: 120 TABLET | Refills: 11 | Status: SHIPPED | OUTPATIENT
Start: 2017-11-17 | End: 2018-06-01

## 2017-11-17 NOTE — PATIENT INSTRUCTIONS
(E11.9) Type 2 diabetes mellitus without complication, without long-term current use of insulin (H)  (primary encounter diagnosis)  Comment:    Plan: Hemoglobin A1c             (K59.01) Slow transit constipation  Comment:    Plan: senna-docusate (SENOKOT-S;PERICOLACE) 8.6-50 MG        per tablet

## 2017-11-17 NOTE — NURSING NOTE
"Chief Complaint   Patient presents with     Thyroid Problem     Recheck Medication       Initial /60  Pulse 83  Temp 97.5  F (36.4  C) (Tympanic)  Resp 20  Wt 245 lb (111.1 kg)  SpO2 100%  BMI 35.15 kg/m2 Estimated body mass index is 35.15 kg/(m^2) as calculated from the following:    Height as of 5/15/17: 5' 10\" (1.778 m).    Weight as of this encounter: 245 lb (111.1 kg).  Medication Reconciliation: complete   Katina Marie CMA    "

## 2017-11-17 NOTE — PROGRESS NOTES
SUBJECTIVE:   Suze Devlin is a 72 year old male who presents to clinic today for the following health issues:      Medication Followup of patient brought all meds to clinic    Taking Medication as prescribed: yes    Side Effects:  None    Medication Helping Symptoms:  yes     Thyroid follow up      Duration: unsure  Went to specialist but needed a referral, so he was not seen     THYROID MASS LEFT THYROID GETTING BIGGER     WANTS THIS EVALUATED     .CONSTIPATED WILLING TO TRY INCREASE IN PERICOLACE     DIABETES 2 GOAL 8% WELL CONTROLLED CURRENT REGIMEN    4 MEDICATIONS     HYPERGLYCEMIA AND ISSUE     HYPERTENSION WITH GOAL OF LESS THAN 140/80 WELL CONTROLLED CURRENT MEDICATIONS     HISTORY OF ELEVATED PSA ON FINESTERIDE AND FLOMAX     INSOMNIA ONGOING    VITAMIN D REPLACEMENT      B12 REPLACEMENT FOR MEMORY AND FOOT AND HAND NUMBNESS    MEMORY LOSS IMPROVED ON B12     MILD ANXIETY AND DEPRESSION MEDICATION MONITORING REQUIRED    CHRONIC FATIGUE IMPROVED WITH B12 TREATMENT     KNOWN MILD DIABETIC NEUROPATHY     CHRONIC LOWER BACK PAIN with RADICULOPATHY IMPROVED     MILD INTERMITTENT ASTHMA WELL CONTROLLED    NEGATIVE TB GOLD TEST               In addition I am monitoring .has Hypertension goal BP (blood pressure) < 140/80; Hyperlipidemia LDL goal <100; Mild persistent asthma without complication; and Type 2 diabetes mellitus without complication, without long-term current use of insulin (H) on his pertinent problem list.     .  Current Outpatient Prescriptions   Medication Sig Dispense Refill     senna-docusate (SENOKOT-S;PERICOLACE) 8.6-50 MG per tablet Take 2 tablets by mouth 2 times daily 120 tablet 11     atorvastatin (LIPITOR) 20 MG tablet Take 1 tablet (20 mg) by mouth daily 90 tablet 3     losartan (COZAAR) 25 MG tablet Take 1 tablet (25 mg) by mouth daily 90 tablet 3     sitagliptin (JANUVIA) 50 MG tablet Take 1 tablet (50 mg) by mouth daily 90 tablet 1     glipiZIDE (GLUCOTROL XL) 10 MG 24 hr  tablet Take 1 tablet (10 mg) by mouth daily 90 tablet 1     pioglitazone (ACTOS) 15 MG tablet Take 1 tablet (15 mg) by mouth daily 90 tablet 1     omeprazole (PRILOSEC) 40 MG capsule Take 1 capsule (40 mg) by mouth daily Take 30-60 minutes before a meal. 91 capsule 3     tamsulosin (FLOMAX) 0.4 MG capsule Take 1 capsule (0.4 mg) by mouth daily 90 capsule 3     metFORMIN modified (GLUMETZA) 1000 MG 24 hr tablet Take 1 tablet (1,000 mg) by mouth daily (with dinner) 30 tablet 11     finasteride (PROSCAR) 5 MG tablet Take 1 tablet (5 mg) by mouth daily 90 tablet 3     aspirin 81 MG EC tablet Take 1 tablet (81 mg) by mouth daily 90 tablet 3     cholecalciferol (VITAMIN D3) 48491 UNITS capsule Take 1 capsule (50,000 Units) by mouth once a week 4 capsule 11     acetaminophen 500 MG CAPS Take 1 tablet by mouth 4 times daily 120 capsule 11     Cyanocobalamin (B-12) 1000 MCG TBCR Take 1,000 mcg by mouth daily 90 tablet 2     order for DME Lancets.  Four times daily and prn. Fill per patient's insurance.      Microlet lancets 400 each 1     order for DME Equipment being ordered: Nebulizer  ONE  USE Q 4 HOURS PRN EXACERBATION OF ASTHMA ML  ALBUTEROL 2.5MG PER 3ML   (J45.20) Mild intermittent asthma without complication  (primary encounter diagnosis)    MAKI MORA JR., MD 1 each 1     blood glucose monitoring (NO BRAND SPECIFIED) meter device kit Use to test blood sugar two times daily or as directed. 1 kit 0     blood glucose monitoring (NO BRAND SPECIFIED) test strip Use to test blood sugars two times daily or as directed 100 each 4     blood glucose monitoring (ONE TOUCH DELICA) lancets Use to test blood sugars  TWICE DAILY    or as directed. 3 Box prn     triamcinolone (KENALOG) 0.1 % cream Apply sparingly to affected area three times daily as needed (Patient not taking: Reported on 11/17/2017) 80 g 11     montelukast (SINGULAIR) 10 MG tablet Take 1 tablet (10 mg) by mouth At Bedtime (Patient not taking: Reported on  11/17/2017) 30 tablet 11     fluticasone-salmeterol (ADVAIR) 100-50 MCG/DOSE diskus inhaler Inhale 1 puff into the lungs 2 times daily (Patient not taking: Reported on 11/17/2017) 1 Inhaler 3     albuterol (PROAIR HFA/PROVENTIL HFA/VENTOLIN HFA) 108 (90 BASE) MCG/ACT Inhaler Inhale 2 puffs into the lungs 4 times daily as needed (Patient not taking: Reported on 11/17/2017) 1 Inhaler 11     valACYclovir (VALTREX) 1000 mg tablet Take 1 tablet (1,000 mg) by mouth 3 times daily (Patient not taking: Reported on 11/17/2017) 21 tablet 0     ipratropium - albuterol 0.5 mg/2.5 mg/3 mL (DUONEB) 0.5-2.5 (3) MG/3ML nebulization Take 1 vial (3 mLs) by nebulization every 6 hours as needed for shortness of breath / dyspnea or wheezing (Patient not taking: Reported on 11/17/2017) 2.5 mL o     diclofenac (VOLTAREN) 1 % GEL Apply 4 grams to knees or 2 grams to hands four times daily using enclosed dosing card. (Patient not taking: Reported on 11/17/2017) 100 g 1     lidocaine (XYLOCAINE) 5 % ointment One application 4 x daily to affected areas lower back and knees if necessary (Patient not taking: Reported on 11/17/2017) 300 g 0     tadalafil (CIALIS) 5 MG tablet Take 1 tablet (5 mg) by mouth daily Never use with nitroglycerin, terazosin or doxazosin. (Patient not taking: Reported on 11/17/2017) 90 tablet 0     triamcinolone (KENALOG) 0.1 % paste Take by mouth 2 times daily (Patient not taking: Reported on 11/17/2017) 5 g 11        No Known Allergies    Immunization History   Administered Date(s) Administered     Influenza (High Dose) 3 valent vaccine 10/03/2014, 10/13/2015, 11/03/2017     Pneumococcal 23 valent 10/17/2014     TD (ADULT, 7+) 04/04/2005, 02/03/2006, 08/08/2006     TDAP Vaccine (Adacel) 10/13/2015     Varicella 04/04/2005, 02/03/2006         reports that he does not drink alcohol.      reports that he does not use illicit drugs.    family history includes Unknown/Adopted in his mother.    indicated that the status of  his mother is unknown. He indicated that all of his four daughters are alive. He indicated that all of his five sons are alive.      has a past surgical history that includes hernia repair, inguinal rt/lt.     reports that he does not engage in sexual activity.  .  Pediatric History   Patient Guardian Status     Not on file.     Other Topics Concern     Parent/Sibling W/ Cabg, Mi Or Angioplasty Before 65f 55m? No     Social History Narrative         reports that he has never smoked. He has never used smokeless tobacco.    Medical, social, surgical, and family histories reviewed.    Labs reviewed in EPIC  Patient Active Problem List   Diagnosis     Health Care Home     Cataract     Elevated prostate specific antigen (PSA)     Psychophysiological insomnia     Vitamin D deficiency     Hypertrophy of prostate without urinary obstruction     Memory loss     Anxiety state     Gastroesophageal reflux disease without esophagitis     Primary localized osteoarthrosis, lower leg     Vitamin D insufficiency     Chronic idiopathic constipation     Vitamin B 12 deficiency     Hypertension goal BP (blood pressure) < 140/80     Chronic fatigue     Hyperlipidemia LDL goal <100     GERD (gastroesophageal reflux disease)     Mild persistent asthma without complication     Diabetic neuropathy (H)     Lumbago     ACP (advance care planning)     Type 2 diabetes mellitus without complication, without long-term current use of insulin (H)     Mild intermittent asthma without complication     Screening examination for pulmonary tuberculosis     Past Surgical History:   Procedure Laterality Date     HERNIA REPAIR, INGUINAL RT/LT         Social History   Substance Use Topics     Smoking status: Never Smoker     Smokeless tobacco: Never Used     Alcohol use No     Family History   Problem Relation Age of Onset     Unknown/Adopted Mother      medical history of family is unknown.         No Known Allergies  Recent Labs   Lab Test  11/17/17    1054  09/12/17   1227  05/15/17   1209  10/31/16   1459  03/11/16   1000   03/11/15   1054   A1C  8.0*  8.0*  8.0*  6.7*  9.7*   < >  7.9*   LDL   --   74   --   85  135*   --   78   HDL   --   44   --   47  55   --   49   TRIG   --   158*   --   146  122   --   134   ALT   --   30   --   28   --    --   19   CR   --   1.09   --   1.04  1.00   < >  1.00   GFRESTIMATED   --   66   --   70  73   < >  74   GFRESTBLACK   --   80   --   85  89   < >  89   POTASSIUM   --   4.1   --   4.4  5.3   < >  4.2   TSH   --   3.75   --    --   4.36   --    --     < > = values in this interval not displayed.        BP Readings from Last 6 Encounters:   11/17/17 126/60   11/03/17 120/64   09/12/17 128/68   05/22/17 104/54   05/15/17 110/56   05/08/17 116/56       Wt Readings from Last 3 Encounters:   11/17/17 245 lb (111.1 kg)   11/03/17 243 lb (110.2 kg)   09/12/17 243 lb (110.2 kg)         Positive symptoms or findings indicated by bold designation:     ROS: 10 point ROS neg other than the symptoms noted above in the HPI.except  has Health Care Home; Cataract; Elevated prostate specific antigen (PSA); Psychophysiological insomnia; Vitamin D deficiency; Hypertrophy of prostate without urinary obstruction; Memory loss; Anxiety state; Gastroesophageal reflux disease without esophagitis; Primary localized osteoarthrosis, lower leg; Vitamin D insufficiency; Chronic idiopathic constipation; Vitamin B 12 deficiency; Hypertension goal BP (blood pressure) < 140/80; Chronic fatigue; Hyperlipidemia LDL goal <100; GERD (gastroesophageal reflux disease); Mild persistent asthma without complication; Diabetic neuropathy (H); Lumbago; ACP (advance care planning); Type 2 diabetes mellitus without complication, without long-term current use of insulin (H); Mild intermittent asthma without complication; and Screening examination for pulmonary tuberculosis on his problem list.   Constitutional: The patient denied fatigue, fever, insomnia, night  sweats, recent illness and weight loss.  WEIGHT STABLE      Eyes: The patient denied blindness, eye pain, eye tearing, photophobia, vision change and visual disturbance. NORMAL VISION       Ears/Nose/Throat/Neck: The patient denied dizziness, facial pain, hearing loss, nasal discharge, oral pain, otalgia, postnasal drip, sinus congestion, sore throat, tinnitus and voice change.   NORMAL HEARING     Cardiovascular: The patient denied arrhythmia, chest pain/pressure, claudication, edema, exercise intolerance, fatigue, orthopnea, palpitations and syncope.      Respiratory: The patient denied asthma, chest congestion, cough, dyspnea on exertion, dyspnea/shortness of breath, hemoptysis, pedal edema, pleuritic pain, productive sputum, snoring and wheezing. MILD ASTHMA WELL CON TROLLED      Gastrointestinal: The patient denied abdominal pain, anorexia, constipation, diarrhea, dysphagia, gastroesophageal reflux, hematochezia, hemorrhoids, melena, nausea and vomiting . GASTROESOPHAGEAL REFLUX DISEASE WITHOUT ESOPHAGITIS     Genitourinary/Nephrology: The patient denied breast complaint, dysuria, nocturia sexual dysfunction, t, urinary frequency, urinary incontinence, urinary urgency    BENIGN PROSTATIC HYPERTROPHY SYMPTOMS     Musculoskeletal: The patient denied arthralgia(s), back pain, joint complaint, muscle weakness, myalgias, osteoporosis, sciatica, stiffness and swelling.  CHRONIC LOWER BACK PAIN      Dermatoligic:: The patient denied acne, dermatitis, ecchymosis, itching, mole change, rash, skin cancer, skin lesion and sores.      Neurologic: The patient denied dizziness, gait abnormality, headache, memory loss, mental status change, paresis, paresthesia, seizure, syncope, tremor and vision change. NUMBNESS FEET DIABETES 2 GOAL 8% INDUCED     Psychiatric: The patient denied anxiety, depression, disturbances of memory, drug abuse, insomnia, mood swings and relationship difficulties.  MILD ANXIETY AND MEMORY LOSS      Endocrine: The patient denied , goiter, obesity, polyuria and thyroid disease.  DIABETES 2 GOAL 8%      Hematologic/Lymphatic: The patient denied abnormal bleeding and bruising, abnormal ecchymoses, anemia, lymph node enlargement/mass, petechiae and venous  Thrombosis.      Allergy/Immunology: The patient denied food allergy and  Allergic rhinitis or conjunctivitis.  MILD ASTHMA       PE:  /60  Pulse 83  Temp 97.5  F (36.4  C) (Tympanic)  Resp 20  Wt 245 lb (111.1 kg)  SpO2 100%  BMI 35.15 kg/m2 Body mass index is 35.15 kg/(m^2).    Constitutional: general appearance, well nourished, well developed, in no acute distress, well developed, appears stated age, normal body habitus,      Eyes:; The patient has normal eyelids sclerae and conjunctivae :      Ears/Nose/Throat: external ear, overall: normal appearance; external nose, overall: benign appearance, normal moujth gums and lips  The patient has:      Neck: thyroid, overall: normal size, normal consistency, nontender,      Respiratory:  palpation of chest, overall: normal excursion,    Clear to percussion and auscultation  WITHIN NORMAL LIMITS      Tachypnea  NORMAL   Color  NORMAL     Cardiovascular:  Good color with no peripheral edema    Regular sinus rhythm without murmur. Physiologic heart sounds Heart is unelarged  .   Chest/Breast: normal shape       Abdominal exam,  Liver and spleen are  unenlarged        Tenderness    Scars  WITHIN NORMAL LIMITS      Urogenital; no renal, flank or bladder  tenderness;  NORMAL      Lymphatic: neck nodes,  NORMAL    Other nodes  NOT APPLICABLE     Musculoskeletal:  Brief ortho exam normal except:   DECREASE RANGE OF MOTION OF BACK     Integument: inspection of skin, no rash, lesions; and, palpation, no induration, no tenderness.      Neurologic mental status, overall: alert and oriented; gait, no ataxia, no unsteadiness; coordination, no tremors; cranial nerves, overall: normal motor, overall: normal bulk,  tone.      Psychiatric: orientation/consciousness, overall: oriented to person, place and time; behavior/psychomotor activity, no tics, normal psychomotor activity; mood and affect, overall: normal mood and affect; appearance, overall: well-groomed, good eye contact; speech, overall: normal quality, no aphasia and normal quality, quantity, intact.      Diagnostic Test Results:  Results for orders placed or performed in visit on 11/17/17   Hemoglobin A1c   Result Value Ref Range    Hemoglobin A1C 8.0 (H) 4.3 - 6.0 %         ICD-10-CM    1. Type 2 diabetes mellitus without complication, without long-term current use of insulin (H) E11.9 Hemoglobin A1c   2. Slow transit constipation K59.01 senna-docusate (SENOKOT-S;PERICOLACE) 8.6-50 MG per tablet        .    Side effects benefits and risks thoroughly discussed. .he may come in early if unimproved or getting worse          Importance of adhering to regimen discussed and if medications were dispensed, the importance of taking medications discussed and bringing in the medications after every visit for chronic problems         Please drink 2 glasses of water prior to meals and walk 15-30 minutes after meals    I spent 25 MINUTES SPENT  with patient discussing the following issues   The primary encounter diagnosis was Type 2 diabetes mellitus without complication, without long-term current use of insulin (H). A diagnosis of Slow transit constipation was also pertinent to this visit. over half of which involved counseling and coordination of care.    Patient Instructions   (E11.9) Type 2 diabetes mellitus without complication, without long-term current use of insulin (H)  (primary encounter diagnosis)  Comment:    Plan: Hemoglobin A1c             (K59.01) Slow transit constipation  Comment:    Plan: senna-docusate (SENOKOT-S;PERICOLACE) 8.6-50 MG        per tablet                        ALL THE ABOVE PROBLEMS ARE STABLE AND MED CHANGES AS NOTED    Diet:  MEDITERRANEAN DIET   AND DIABETES     Exercise:  UPPER EXTREMETIES AND LOWER EXTREMITY AND LOWER BACK PAIN  AND AEROBIC   Exercises Range of motion, balance, isometric, and strengthening exercises 30 repetitions twice daily of involved joints      .MAKI MORA MD 11/17/2017 1:20 PM  November 17, 2017

## 2017-11-17 NOTE — MR AVS SNAPSHOT
"              After Visit Summary   11/17/2017    Suze Devlin    MRN: [id number]           Patient Information     Date Of Birth          1945        Visit Information        Provider Department      11/17/2017 10:15 AM Zack Hudson MD; [employer] [facility]        Today's Diagnoses     Type 2 diabetes mellitus without complication, without long-term current use of insulin (H)    -  1    Slow transit constipation          Care Instructions    (E11.9) Type 2 diabetes mellitus without complication, without long-term current use of insulin (H)  (primary encounter diagnosis)  Comment:    Plan: Hemoglobin A1c             (K59.01) Slow transit constipation  Comment:    Plan: senna-docusate (SENOKOT-S;PERICOLACE) 8.6-50 MG        per tablet                          Follow-ups after your visit        Who to contact     If you have questions or need follow up information about today's clinic visit or your schedule please contact [facility] directly at [phone].  Normal or non-critical lab and imaging results will be communicated to you by FanFueledhart, letter or phone within 4 business days after the clinic has received the results. If you do not hear from us within 7 days, please contact the clinic through N2Caret or phone. If you have a critical or abnormal lab result, we will notify you by phone as soon as possible.  Submit refill requests through InstaGIS or call your pharmacy and they will forward the refill request to us. Please allow 3 business days for your refill to be completed.          Additional Information About Your Visit        FanFueledharWoraPay Information     InstaGIS lets you send messages to your doctor, view your test results, renew your prescriptions, schedule appointments and more. To sign up, go to [email or web address] . Click on \"Log in\" on the left side of the screen, which will take you to the " "Welcome page. Then click on \"Sign up Now\" on the right side of the page.     You will be asked to enter the access code listed below, as well as some personal information. Please follow the directions to create your username and password.     Your access code is: [email or web address]  Expires: 2017 11:30 AM     Your access code will  in 90 days. If you need help or a new code, please call your [facility] or [phone].        Care EveryWhere ID     This is your Care EveryWhere ID. This could be used by other organizations to access your [place] medical records  [id number]        Your Vitals Were     Pulse Temperature Respirations Pulse Oximetry BMI (Body Mass Index)       83 97.5  F (36.4  C) (Tympanic) 20 100% 35.15 kg/m2        Blood Pressure from Last 3 Encounters:   17 126/60   17 120/64   17 128/68    Weight from Last 3 Encounters:   17 245 lb (111.1 kg)   17 243 lb (110.2 kg)   17 243 lb (110.2 kg)              We Performed the Following     Hemoglobin A1c          Today's Medication Changes          These changes are accurate as of: 17 10:54 AM.  If you have any questions, ask your nurse or doctor.               These medicines have changed or have updated prescriptions.        Dose/Directions    senna-docusate 8.6-50 MG per tablet   Commonly known as:  SENOKOT-S;PERICOLACE   This may have changed:  how much to take   Used for:  Slow transit constipation   Changed by:  Zack Hudson MD        Dose:  2 tablet   Take 2 tablets by mouth 2 times daily   Quantity:  120 tablet   Refills:  11            Where to get your medicines      These medications were sent to [facility] - [address] [address]     Phone:  [phone]     senna-docusate 8.6-50 MG per tablet                Primary Care Provider Office Phone # Fax #    Zack Hudson [phone] " [phone]       [address]  [facility] [address]        Equal Access to Services     CHRISTIANO FRANCIS : Hadii aad ku hademmao Sochristineali, waaxda luqadaha, qaybta kaalmada adeegyada, lisa lorenin hayaatanner cheungyahir rea bharathi shaw. So [facility] [place] [phone].    ATENCIÓN: Si habla español, tiene a wells disposición servicios gratuitos de asistencia lingüística. Llame al [phone].    We comply with applicable federal civil rights laws and [place] laws. We do not discriminate on the basis of race, color, national origin, age, disability, sex, sexual orientation, or gender identity.            Thank you!     Thank you for choosing [facility]  for your care. Our goal is always to provide you with excellent care. Hearing back from our patients is one way we can continue to improve our services. Please take a few minutes to complete the written survey that you may receive in the mail after your visit with us. Thank you!             Your Updated Medication List - Protect others around you: Learn how to safely use, store and throw away your medicines at www.disposemymeds.org.          This list is accurate as of: 11/17/17 10:54 AM.  Always use your most recent med list.                   Brand Name Dispense Instructions for use Diagnosis    acetaminophen 500 MG Caps     120 capsule    Take 1 tablet by mouth 4 times daily    Primary osteoarthritis of both knees       albuterol 108 (90 BASE) MCG/ACT Inhaler    PROAIR HFA/PROVENTIL HFA/VENTOLIN HFA    1 Inhaler    Inhale 2 puffs into the lungs 4 times daily as needed    Wheezing, Mild intermittent asthma with acute exacerbation       aspirin 81 MG EC tablet     90 tablet    Take 1 tablet (81 mg) by mouth daily    Hyperlipidemia LDL goal <100       atorvastatin 20 MG tablet    LIPITOR    90 tablet    Take 1 tablet (20 mg) by mouth daily    Hyperlipidemia LDL goal <100, Flatulence, eructation, and gas pain       B-12 1000 MCG Tbcr     90 tablet     Take 1,000 mcg by mouth daily    Vitamin B 12 deficiency       blood glucose monitoring lancets     3 Box    Use to test blood sugars  TWICE DAILY    or as directed.    Type 2 diabetes mellitus without complication (H)       blood glucose monitoring meter device kit    no brand specified    1 kit    Use to test blood sugar two times daily or as directed.    Type 2 diabetes mellitus with diabetic nephropathy (H)       blood glucose monitoring test strip    no brand specified    100 each    Use to test blood sugars two times daily or as directed    Type 2 diabetes mellitus with diabetic nephropathy (H)       cholecalciferol 40562 UNITS capsule    VITAMIN D3    4 capsule    Take 1 capsule (50,000 Units) by mouth once a week    Vitamin D insufficiency       diclofenac 1 % Gel topical gel    VOLTAREN    100 g    Apply 4 grams to knees or 2 grams to hands four times daily using enclosed dosing card.    Primary osteoarthritis of left knee       finasteride 5 MG tablet    PROSCAR    90 tablet    Take 1 tablet (5 mg) by mouth daily    BPH associated with nocturia       fluticasone-salmeterol 100-50 MCG/DOSE diskus inhaler    ADVAIR    1 Inhaler    Inhale 1 puff into the lungs 2 times daily    Acute bronchitis, unspecified organism       glipiZIDE 10 MG 24 hr tablet    GLUCOTROL XL    90 tablet    Take 1 tablet (10 mg) by mouth daily    Type 2 diabetes mellitus with diabetic nephropathy, without long-term current use of insulin (H)       ipratropium - albuterol 0.5 mg/2.5 mg/3 mL 0.5-2.5 (3) MG/3ML neb solution    DUONEB    2.5 mL    Take 1 vial (3 mLs) by nebulization every 6 hours as needed for shortness of breath / dyspnea or wheezing    Acute bronchitis due to other specified organisms, Wheezing, Mild intermittent asthma with acute exacerbation       lidocaine 5 % ointment    XYLOCAINE    300 g    One application 4 x daily to affected areas lower back and knees if necessary    Midline low back pain with sciatica, sciatica  laterality unspecified       losartan 25 MG tablet    COZAAR    90 tablet    Take 1 tablet (25 mg) by mouth daily    Hypertension goal BP (blood pressure) < 140/80       metFORMIN modified 1000 MG 24 hr tablet    GLUMETZA    30 tablet    Take 1 tablet (1,000 mg) by mouth daily (with dinner)    Slow transit constipation       montelukast 10 MG tablet    SINGULAIR    30 tablet    Take 1 tablet (10 mg) by mouth At Bedtime    Chronic seasonal allergic rhinitis due to pollen, Mild persistent asthma without complication       omeprazole 40 MG capsule    priLOSEC    91 capsule    Take 1 capsule (40 mg) by mouth daily Take 30-60 minutes before a meal.    Flatulence, eructation and gas pain, Gastroesophageal reflux disease without esophagitis       * order for DME     1 each    Equipment being ordered: Nebulizer ONE USE Q 4 HOURS PRN EXACERBATION OF ASTHMA ML ALBUTEROL 2.5MG PER 3ML  (J45.20) Mild intermittent asthma without complication  (primary encounter diagnosis)  MAKI MORA JR., MD    Mild intermittent asthma without complication       * order for DME     400 each    Lancets.  Four times daily and prn. Fill per patient's insurance.   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    Sexual dysfunction       tamsulosin 0.4 MG capsule    FLOMAX    90 capsule    Take 1 capsule (0.4 mg) by mouth daily    Nocturia       triamcinolone 0.1 % cream    KENALOG    80 g    Apply sparingly to affected area three times daily as needed    Neurodermatitis, localized       triamcinolone 0.1 % paste    KENALOG    5 g    Take by mouth 2 times daily    Lesion of lip       valACYclovir 1000 mg tablet    VALTREX    21 tablet    Take 1 tablet (1,000 mg) by mouth 3 times daily    Varicella-zoster virus infection       * Notice:  This list has 2 medication(s) that are the same as other medications prescribed for you. Read the directions carefully, and ask your doctor or other care provider to review them with you.

## 2017-11-17 NOTE — LETTER
[facility]  [address]  [address]  [facility] [place] [address]  [phone]                                                                                                           Suze Devlin  [address]   [facility] [address]    November 17, 2017      Dear Suze,    THREE MONTH GLUCOSE AVERAGE STILL ABOVE TARGET LEVEL  RETURN TO CLINIC  3 MONTHS    Enclosed is a copy of the results.     Thank you for choosing [facility].  We appreciate the opportunity to serve you and look forward to supporting your healthcare needs in the future.    If you have any questions or concerns, please call me or my staff at [phone].      Sincerely,    Zack Hudson Jr MD    Results for orders placed or performed in visit on 11/17/17   Hemoglobin A1c   Result Value Ref Range    Hemoglobin A1C 8.0 (H) 4.3 - 6.0 %

## 2017-11-24 ENCOUNTER — TELEPHONE (OUTPATIENT)
Dept: FAMILY MEDICINE | Facility: CLINIC | Age: 72
End: 2017-11-24

## 2017-11-24 PROCEDURE — G0179 MD RECERTIFICATION HHA PT: HCPCS | Performed by: FAMILY MEDICINE

## 2017-11-24 NOTE — TELEPHONE ENCOUNTER
Our goal is to have forms completed within 72 hours, however some forms may require a visit or additional information.    What clinic location was the form placed at [facility] or [place].?     Who is the form from? Home Care  Where did the form come from? Faxed to clinic   The form was placed in the inbox of Zack Hudson Jr MD      Please fax to [phone]    Additional comments: [place] home care cert 11/3/2017-1/01/2018    Call take on 11/24/2017 at 10:46 AM by May Adames

## 2017-12-09 DIAGNOSIS — E11.21 CONTROLLED TYPE 2 DIABETES MELLITUS WITH MICROALBUMINURIC DIABETIC NEPHROPATHY (H): ICD-10-CM

## 2017-12-09 DIAGNOSIS — E08.49 OTHER DIABETIC NEUROLOGICAL COMPLICATION ASSOCIATED WITH DIABETES MELLITUS DUE TO UNDERLYING CONDITION (H): ICD-10-CM

## 2017-12-11 RX ORDER — PIOGLITAZONEHYDROCHLORIDE 15 MG/1
15 TABLET ORAL DAILY
Qty: 90 TABLET | Refills: 1 | Status: SHIPPED | OUTPATIENT
Start: 2017-12-11 | End: 2018-06-01

## 2017-12-11 NOTE — TELEPHONE ENCOUNTER
Requested Prescriptions   Pending Prescriptions Disp Refills     pioglitazone (ACTOS) 15 MG tablet 90 tablet 1     Sig: Take 1 tablet (15 mg) by mouth daily    Thiazolidinedione Agents (TZD's)  Failed    12/9/2017  9:39 AM       Failed - Patient has a normal AST within the past 12 mos.     Recent Labs   Lab Test  11/02/12   2119   AST  25            Passed - Patient's BP is less than 140/90    BP Readings from Last 3 Encounters:   11/17/17 126/60   11/03/17 120/64   09/12/17 128/68                Passed - Patient has documented LDL within the past 12 mos.    Recent Labs   Lab Test  09/12/17   1227   LDL  74            Passed - Patient has a normal ALT within the past 12 mos.    Recent Labs   Lab Test  09/12/17   1227   ALT  30            Passed - Patient has had a Microalbumin in the past 12 mos.    Recent Labs   Lab Test  09/12/17   1228   09/20/12   1505   [id number]   --    --   16.4   PY5088   --    --   9.4   MICROL  8   < >   --    UMALCR  13.06   < >   --     < > = values in this interval not displayed.            Passed - Patient has documented A1c within the specified period of time.    Recent Labs   Lab Test  11/17/17   1054   A1C  8.0*            Passed - Diagnosis not CHF       Passed - Patient is age 18 or older       Passed - Patient has a normal serum Creatinine in the past 12 months    Recent Labs   Lab Test  09/12/17   1227   CR  1.09            Passed - Patient has had an appointment with authorizing provider within the past 6 mos.or within the next 30 days.    Patient had office visit in the last 6 months or has a visit in the next 30 days with authorizing provider.  See chart review.           Routing refill request to provider for review/approval because:  Labs not current:  AST- order pended

## 2017-12-29 ENCOUNTER — TELEPHONE (OUTPATIENT)
Dept: FAMILY MEDICINE | Facility: CLINIC | Age: 72
End: 2017-12-29

## 2017-12-29 NOTE — TELEPHONE ENCOUNTER
Verbal okay given for the following orders:     Skilled nursing services 2 x a month for 1 months; 3 x month x 1 month  3 prns: diabetic education and medication set up      Michelle Silverio RN

## 2018-01-17 ENCOUNTER — TELEPHONE (OUTPATIENT)
Dept: FAMILY MEDICINE | Facility: CLINIC | Age: 73
End: 2018-01-17

## 2018-01-17 PROCEDURE — G0179 MD RECERTIFICATION HHA PT: HCPCS | Performed by: FAMILY MEDICINE

## 2018-01-17 NOTE — TELEPHONE ENCOUNTER
Our goal is to have forms completed within 72 hours, however some forms may require a visit or additional information.    What clinic location was the form placed at [facility] or [place].?     Who is the form from? [facility] -  Cert Period 1/2/18 - 3/2/18  Where did the form come from? Faxed to clinic   The form was placed in the inbox of Zack Hudson Jr MD      Please fax to [phone]    Additional comments:     Call take on 1/17/2018 at 4:36 PM by Grace Sherman

## 2018-02-23 DIAGNOSIS — E11.9 TYPE 2 DIABETES MELLITUS WITHOUT COMPLICATION, WITHOUT LONG-TERM CURRENT USE OF INSULIN (H): Primary | Chronic | ICD-10-CM

## 2018-02-23 DIAGNOSIS — E55.9 VITAMIN D INSUFFICIENCY: ICD-10-CM

## 2018-02-23 DIAGNOSIS — E11.21 TYPE 2 DIABETES MELLITUS WITH DIABETIC NEPHROPATHY, WITHOUT LONG-TERM CURRENT USE OF INSULIN (H): Chronic | ICD-10-CM

## 2018-02-23 DIAGNOSIS — E53.8 VITAMIN B 12 DEFICIENCY: ICD-10-CM

## 2018-02-26 ENCOUNTER — CARE COORDINATION (OUTPATIENT)
Dept: GERIATRIC MEDICINE | Facility: CLINIC | Age: 73
End: 2018-02-26

## 2018-02-26 RX ORDER — CHOLECALCIFEROL (VITAMIN D3) 50 MCG
2000 TABLET ORAL DAILY
Qty: 100 TABLET | Refills: 3 | Status: SHIPPED | OUTPATIENT
Start: 2018-02-26 | End: 2019-03-27

## 2018-02-26 NOTE — TELEPHONE ENCOUNTER
"Requested Prescriptions   Pending Prescriptions Disp Refills     sitagliptin (JANUVIA) 50 MG tablet  Last Written Prescription Date:  8/9/2017  Last Fill Quantity: 90 tablet,  # refills: 1   Last Office Visit  11/17/2017        with  G, P or [facility] prescribing provider:     Future Office Visit:    Next 5 appointments (look out 90 days)     Mar 06, 2018  4:15 PM CST   Office Visit with Zack Hudson MD   [facility] ([facility])    [address] [address] [phone]                90 tablet 1     Sig: Take 1 tablet (50 mg) by mouth daily    DPP4 Inhibitors Protocol Passed    2/23/2018  1:16 PM       Passed - Blood pressure less than 140/90 in past 6 months    BP Readings from Last 3 Encounters:   11/17/17 126/60   11/03/17 120/64   09/12/17 128/68          Passed - LDL on file in past 12 months    Recent Labs   Lab Test  09/12/17   1227   LDL  74          Passed - Microalbumin on file in past 12 months    Recent Labs   Lab Test  09/12/17   1228   09/20/12   1505   AC2100   --    --   16.4   CT2201   --    --   9.4   MICROL  8   < >   --    UMALCR  13.06   < >   --     < > = values in this interval not displayed.          Passed - HgbA1C in past 3 or 6 months    Recent Labs   Lab Test  11/17/17   1054   A1C  8.0*          Passed - Patient is age 18 or older       Passed - Normal serum creatinine in past 12 months    Recent Labs   Lab Test  09/12/17   1227   CR  1.09          Passed - Recent visit with authorizing provider's specialty in past 6 months    Patient had office visit in the last 6 months or has a visit in the next 30 days with authorizing provider.  See \"Patient Info\" tab in inbasket, or \"Choose Columns\" in Meds & Orders section of the refill encounter.              "

## 2018-02-26 NOTE — TELEPHONE ENCOUNTER
Test Strips    Prescription approved per [facility] Refill Protocol.      VITAMIN D3 49569 UNITS capsule    Routing refill request to provider for review/approval because:  Drug not on the [facility] refill protocol - high dose       (B-12) 1000 MCG TBCR    Prescription approved per [facility] Refill Protocol.

## 2018-02-26 NOTE — TELEPHONE ENCOUNTER
"Requested Prescriptions   Pending Prescriptions Disp Refills     blood glucose monitoring (NO BRAND SPECIFIED) test strip  Last Written Prescription Date:  3/11/2016  Last Fill Quantity: 100 each,  # refills: 4   Last Office Visit  11/17/2017        with  [facility], [facility] or  Health prescribing provider:     Future Office Visit:    Next 5 appointments (look out 90 days)     Mar 06, 2018  4:15 PM CST   Office Visit with Zack Hudson MD   [facility] ([facility])    [address] [place] [address] [address]  [facility] [address]   [phone]                100 each 4     Sig: Use to test blood sugars two times daily or as directed    Diabetic Supplies Protocol Passed    2/23/2018 11:45 AM       Passed - Patient is 18 years of age or older       Passed - Patient has had appt within past 6 mos    Patient had office visit in the last 6 months or has a visit in the next 30 days with authorizing provider.  See \"Patient Info\" tab in inbasket, or \"Choose Columns\" in Meds & Orders section of the refill encounter.                  cholecalciferol (VITAMIN D3) 79224 UNITS capsule.  Last Written Prescription Date:  2/24/2017  Last Fill Quantity: 4 capsule,  # refills: 11   Last Office Visit  11/17/2017        with  [facility], [facility] or  Health prescribing provider:     Future Office Visit:    Next 5 appointments (look out 90 days)     Mar 06, 2018  4:15 PM CST   Office Visit with Zack Hudson MD   [facility] ([facility])    [address] [place] [address] [address]  [facility] [address]   [phone]                4 capsule 11     Sig: Take 1 capsule (50,000 Units) by mouth once a week    Vitamin Supplements (Adult) Protocol Failed    2/23/2018 11:45 AM       Failed - High dose Vitamin D not ordered       Passed - Recent or future visit with authorizing provider's specialty    Patient had office " "visit in the last year or has a visit in the next 30 days with authorizing provider.  See \"Patient Info\" tab in inbasket, or \"Choose Columns\" in Meds & Orders section of the refill encounter.                 Cyanocobalamin (B-12) 1000 MCG TBCR  Last Written Prescription Date:  1/17/2017  Last Fill Quantity: 90 tablet,  # refills: 2   Last Office Visit  11/17/2017        with  [facility], P or [facility] prescribing provider:     Future Office Visit:    Next 5 appointments (look out 90 days)     Mar 06, 2018  4:15 PM CST   Office Visit with Zack Hudson MD   [facility] ([facility])    [address] [address] [phone]                90 tablet 2     Sig: Take 1,000 mcg by mouth daily    Vitamin Supplements (Adult) Protocol Failed    2/23/2018 11:45 AM       Failed - High dose Vitamin D not ordered       Passed - Recent or future visit with authorizing provider's specialty    Patient had office visit in the last year or has a visit in the next 30 days with authorizing provider.  See \"Patient Info\" tab in inbasket, or \"Choose Columns\" in Meds & Orders section of the refill encounter.               "

## 2018-02-26 NOTE — TELEPHONE ENCOUNTER
Completed high-dose vitamin D therapy, ok to decrease to 2000 IU per day. Updated rx sent to patient's pharmacy.

## 2018-02-26 NOTE — PROGRESS NOTES
Placed a call to member and completed six months assessments:  [facility] Six-Month Telephone Assessment    6 month telephone assessment completed on 02/2618.    ER visits: No  Hospitalizations: No  TCU stays: No  Significant health status changes: N/A  Falls/Injuries: No  ADL/IADL changes: No  Changes in services: No    Caregiver Assessment follow up:  N/A    Goals: See POC in chart for goal progress documentation.     Will see client in 6 months for an annual health risk assessment.   Encouraged client to call CM with any questions or concerns in the meantime.   Digna Chen RN PHN  [facility]   Phone:   [phone]  Fax:       [phone]

## 2018-03-01 ENCOUNTER — CARE COORDINATION (OUTPATIENT)
Dept: GERIATRIC MEDICINE | Facility: CLINIC | Age: 73
End: 2018-03-01

## 2018-03-01 NOTE — PROGRESS NOTES
Member called and stated that he is considering changing ADC providers but will let this CM if he finally decides to change ADC provider.  Digna Chen, RN KELLYN  [facility]   Phone:   [phone]  Fax:       [phone]

## 2018-03-06 ENCOUNTER — OFFICE VISIT (OUTPATIENT)
Dept: FAMILY MEDICINE | Facility: CLINIC | Age: 73
End: 2018-03-06
Payer: COMMERCIAL

## 2018-03-06 VITALS
HEIGHT: 70 IN | TEMPERATURE: 97.1 F | BODY MASS INDEX: 35.36 KG/M2 | RESPIRATION RATE: 20 BRPM | WEIGHT: 247 LBS | HEART RATE: 83 BPM | SYSTOLIC BLOOD PRESSURE: 106 MMHG | OXYGEN SATURATION: 96 % | DIASTOLIC BLOOD PRESSURE: 54 MMHG

## 2018-03-06 DIAGNOSIS — J30.1 CHRONIC SEASONAL ALLERGIC RHINITIS DUE TO POLLEN: ICD-10-CM

## 2018-03-06 DIAGNOSIS — E11.21 TYPE 2 DIABETES MELLITUS WITH DIABETIC NEPHROPATHY, WITHOUT LONG-TERM CURRENT USE OF INSULIN (H): Chronic | ICD-10-CM

## 2018-03-06 DIAGNOSIS — R06.2 WHEEZING: ICD-10-CM

## 2018-03-06 DIAGNOSIS — J45.30 MILD PERSISTENT ASTHMA WITHOUT COMPLICATION: Chronic | ICD-10-CM

## 2018-03-06 DIAGNOSIS — J45.21 MILD INTERMITTENT ASTHMA WITH ACUTE EXACERBATION: Chronic | ICD-10-CM

## 2018-03-06 DIAGNOSIS — J20.9 ACUTE BRONCHITIS, UNSPECIFIED ORGANISM: ICD-10-CM

## 2018-03-06 LAB — HBA1C MFR BLD: 7.3 % (ref 4.3–6)

## 2018-03-06 PROCEDURE — 36415 COLL VENOUS BLD VENIPUNCTURE: CPT | Performed by: FAMILY MEDICINE

## 2018-03-06 PROCEDURE — 83036 HEMOGLOBIN GLYCOSYLATED A1C: CPT | Performed by: FAMILY MEDICINE

## 2018-03-06 PROCEDURE — 99214 OFFICE O/P EST MOD 30 MIN: CPT | Performed by: FAMILY MEDICINE

## 2018-03-06 RX ORDER — AZITHROMYCIN 500 MG/1
500 TABLET, FILM COATED ORAL DAILY
Qty: 3 TABLET | Refills: 0 | Status: SHIPPED | OUTPATIENT
Start: 2018-03-06 | End: 2018-03-16

## 2018-03-06 RX ORDER — GLIPIZIDE 10 MG/1
10 TABLET, FILM COATED, EXTENDED RELEASE ORAL DAILY
Qty: 30 TABLET | Refills: 11 | Status: SHIPPED | OUTPATIENT
Start: 2018-03-06 | End: 2019-03-28

## 2018-03-06 RX ORDER — ALBUTEROL SULFATE 90 UG/1
2 AEROSOL, METERED RESPIRATORY (INHALATION) 4 TIMES DAILY PRN
Qty: 1 INHALER | Refills: 11 | Status: SHIPPED | OUTPATIENT
Start: 2018-03-06 | End: 2018-09-12

## 2018-03-06 RX ORDER — MONTELUKAST SODIUM 10 MG/1
10 TABLET ORAL AT BEDTIME
Qty: 30 TABLET | Refills: 11 | Status: SHIPPED | OUTPATIENT
Start: 2018-03-06 | End: 2019-07-19

## 2018-03-06 RX ORDER — PREDNISONE 20 MG/1
20 TABLET ORAL 2 TIMES DAILY
Qty: 14 TABLET | Refills: 0 | Status: SHIPPED | OUTPATIENT
Start: 2018-03-06 | End: 2018-03-16

## 2018-03-06 NOTE — PROGRESS NOTES
SUBJECTIVE:   Suze Devlin is a 73 year old male who presents to clinic today for the following health issues:       Acute Illness   Acute illness concerns: cough  Onset: 2 months    Fever: no    Chills/Sweats: no    Headache (location?): no    Sinus Pressure:no    Conjunctivitis:  no    Ear Pain: no    Rhinorrhea: no    Congestion: no    Sore Throat: no     Cough: YES-productive of yellow-brownish sputum    Wheeze: YES    Decreased Appetite: no    Nausea: no    Vomiting: no    Diarrhea:  no    Dysuria/Freq.: no    Fatigue/Achiness: YES    Sick/Strep Exposure: YES     Therapies Tried and outcome: inhalers, has run out a couple days ago        HISTORY OF ELEVATED  PSA OR PROSTATE CANCER SCREENING TEST,     INSOMNIA ONGOING INTERMITTENT    VITAMIN D REPLACEMENT      IMPROVED MEMORY LOSS     ANXIETY AND DEPRESSION     GASTROESOPHAGEAL REFLUX DISEASE WITHOUT ESOPHAGITIS     OSTEOARTHRITIS BILATERAL KNEE PAIN     CONSTIPATION TREATMENT     VITAMIN B TREATMENT     HYPERTENSION WITH GOAL OF LESS THAN 140/80 CONTROLLED CURRENT MEDICATIONS    CHRONIC FATIGUE     DIABETES DIABETES 2 GOAL 8% WITH NUEROPATH    CHRONIC LOWER BACK PAIN     ASTHMA MILD PERSISTENT WITH EXACERBATION LAST 2 MONTHS         Diabetes Follow-up    Patient is checking blood sugars: not at all  Diabetic concerns: None   Symptoms of hypoglycemia (low blood sugar): none   Paresthesias (numbness or burning in feet) or sores: YES   Date of last diabetic eye exam:  YEARLY RECOMMENDED     Hyperlipidemia Follow-Up    Rate your low fat/cholesterol diet?: good  Taking statin?  Yes, no muscle aches from statin  Other lipid medications/supplements?:  none    Hypertension Follow-up    Outpatient blood pressures are not being checked.  Low Salt Diet: no added salt    BP Readings from Last 2 Encounters:   03/06/18 106/54   11/17/17 126/60     Hemoglobin A1C (%)   Date Value   03/06/2018 7.3 (H)   11/17/2017 8.0 (H)     LDL Cholesterol Calculated (mg/dL)   Date  Value   09/12/2017 74   10/31/2016 85     Asthma Follow-Up    Was ACT completed today?  No    Respiratory symptoms:   Cough: Yes-     Wheezing: Yes-     Shortness of breath: Yes-    Use of short- acting(rescue) inhaler:  YES  Taking controlled (daily) meds as prescribed: No  ER/UC visits or hospital admissions since last visit: none   Recent asthma triggers that patient is dealing with: upper respiratory infections     Amount of exercise or physical activity: 6-7 days/week for an average of less than 15 minutes  Problems taking medications regularly: No  Medication side effects: none  Diet: low salt, low fat/cholesterol and diabetic                 Topic Date Due     FOOT EXAM Q1 YEAR  04/15/2017               .  Current Outpatient Prescriptions   Medication Sig Dispense Refill     fluticasone-salmeterol (ADVAIR) 100-50 MCG/DOSE diskus inhaler Inhale 1 puff into the lungs 2 times daily 1 Inhaler 11     montelukast (SINGULAIR) 10 MG tablet Take 1 tablet (10 mg) by mouth At Bedtime 30 tablet 11     albuterol (PROAIR HFA/PROVENTIL HFA/VENTOLIN HFA) 108 (90 BASE) MCG/ACT Inhaler Inhale 2 puffs into the lungs 4 times daily as needed 1 Inhaler 11     azithromycin (ZITHROMAX) 500 MG tablet Take 1 tablet (500 mg) by mouth daily 3 tablet 0     predniSONE (DELTASONE) 20 MG tablet Take 1 tablet (20 mg) by mouth 2 times daily 14 tablet 0     glipiZIDE (GLUCOTROL XL) 10 MG 24 hr tablet Take 1 tablet (10 mg) by mouth daily 30 tablet 11     blood glucose monitoring (NO BRAND SPECIFIED) test strip Use to test blood sugars two times daily or as directed 100 each 0     Cyanocobalamin (B-12) 1000 MCG TBCR Take 1,000 mcg by mouth daily 90 tablet 2     sitagliptin (JANUVIA) 50 MG tablet Take 1 tablet (50 mg) by mouth daily 90 tablet 0     Cholecalciferol (VITAMIN D) 2000 UNITS tablet Take 2,000 Units by mouth daily 100 tablet 3     pioglitazone (ACTOS) 15 MG tablet Take 1 tablet (15 mg) by mouth daily 90 tablet 1     senna-docusate  (SENOKOT-S;PERICOLACE) 8.6-50 MG per tablet Take 2 tablets by mouth 2 times daily 120 tablet 11     triamcinolone (KENALOG) 0.1 % cream Apply sparingly to affected area three times daily as needed 80 g 11     atorvastatin (LIPITOR) 20 MG tablet Take 1 tablet (20 mg) by mouth daily 90 tablet 3     losartan (COZAAR) 25 MG tablet Take 1 tablet (25 mg) by mouth daily 90 tablet 3     omeprazole (PRILOSEC) 40 MG capsule Take 1 capsule (40 mg) by mouth daily Take 30-60 minutes before a meal. 91 capsule 3     tamsulosin (FLOMAX) 0.4 MG capsule Take 1 capsule (0.4 mg) by mouth daily 90 capsule 3     metFORMIN modified (GLUMETZA) 1000 MG 24 hr tablet Take 1 tablet (1,000 mg) by mouth daily (with dinner) 30 tablet 11     finasteride (PROSCAR) 5 MG tablet Take 1 tablet (5 mg) by mouth daily 90 tablet 3     valACYclovir (VALTREX) 1000 mg tablet Take 1 tablet (1,000 mg) by mouth 3 times daily 21 tablet 0     aspirin 81 MG EC tablet Take 1 tablet (81 mg) by mouth daily 90 tablet 3     acetaminophen 500 MG CAPS Take 1 tablet by mouth 4 times daily 120 capsule 11     order for DME Lancets.  Four times daily and prn. Fill per patient's insurance.      Microlet lancets 400 each 1     order for DME Equipment being ordered: Nebulizer  ONE  USE Q 4 HOURS PRN EXACERBATION OF ASTHMA ML  ALBUTEROL 2.5MG PER 3ML   (J45.20) Mild intermittent asthma without complication  (primary encounter diagnosis)    MAKI MORA JR., MD 1 each 1     ipratropium - albuterol 0.5 mg/2.5 mg/3 mL (DUONEB) 0.5-2.5 (3) MG/3ML nebulization Take 1 vial (3 mLs) by nebulization every 6 hours as needed for shortness of breath / dyspnea or wheezing 2.5 mL o     blood glucose monitoring (NO BRAND SPECIFIED) meter device kit Use to test blood sugar two times daily or as directed. 1 kit 0     diclofenac (VOLTAREN) 1 % GEL Apply 4 grams to knees or 2 grams to hands four times daily using enclosed dosing card. 100 g 1     blood glucose monitoring (ONE TOUCH DELICA)  lancets Use to test blood sugars  TWICE DAILY    or as directed. 3 Box prn     lidocaine (XYLOCAINE) 5 % ointment One application 4 x daily to affected areas lower back and knees if necessary 300 g 0     tadalafil (CIALIS) 5 MG tablet Take 1 tablet (5 mg) by mouth daily Never use with nitroglycerin, terazosin or doxazosin. 90 tablet 0     triamcinolone (KENALOG) 0.1 % paste Take by mouth 2 times daily 5 g 11     [DISCONTINUED] montelukast (SINGULAIR) 10 MG tablet Take 1 tablet (10 mg) by mouth At Bedtime 30 tablet 11     [DISCONTINUED] glipiZIDE (GLUCOTROL XL) 10 MG 24 hr tablet Take 1 tablet (10 mg) by mouth daily 90 tablet 1     [DISCONTINUED] fluticasone-salmeterol (ADVAIR) 100-50 MCG/DOSE diskus inhaler Inhale 1 puff into the lungs 2 times daily (Patient not taking: Reported on 3/6/2018) 1 Inhaler 3     [DISCONTINUED] albuterol (PROAIR HFA/PROVENTIL HFA/VENTOLIN HFA) 108 (90 BASE) MCG/ACT Inhaler Inhale 2 puffs into the lungs 4 times daily as needed 1 Inhaler 11              No Known Allergies      Immunization History   Administered Date(s) Administered     Influenza (High Dose) 3 valent vaccine 10/03/2014, 10/13/2015, 11/03/2017     Pneumococcal 23 valent 10/17/2014     TD (ADULT, 7+) 04/04/2005, 02/03/2006, 08/08/2006     TDAP Vaccine (Adacel) 10/13/2015     Varicella 04/04/2005, 02/03/2006               reports that he does not drink alcohol.          reports that he does not use illicit drugs.        family history includes Unknown/Adopted in his mother.        indicated that the status of his mother is unknown. He indicated that all of his four daughters are alive. He indicated that all of his five sons are alive.          has a past surgical history that includes hernia repair, inguinal rt/lt.         reports that he does not engage in sexual activity.    .  Pediatric History   Patient Guardian Status     Not on file.     Other Topics Concern     Parent/Sibling W/ Cabg, Mi Or Angioplasty Before 65f 55m?  No     Social History Narrative               reports that he has never smoked. He has never used smokeless tobacco.        Medical, social, surgical, and family histories reviewed.        Labs reviewed in EPIC  Patient Active Problem List   Diagnosis     Health Care Home     Cataract     Elevated prostate specific antigen (PSA)     Psychophysiological insomnia     Vitamin D deficiency     Hypertrophy of prostate without urinary obstruction     Memory loss     Anxiety state     Gastroesophageal reflux disease without esophagitis     Primary localized osteoarthrosis, lower leg     Vitamin D insufficiency     Chronic idiopathic constipation     Vitamin B 12 deficiency     Hypertension goal BP (blood pressure) < 140/80     Chronic fatigue     Hyperlipidemia LDL goal <100     GERD (gastroesophageal reflux disease)     Mild persistent asthma without complication     Diabetic neuropathy (H)     Lumbago     ACP (advance care planning)     Type 2 diabetes mellitus without complication, without long-term current use of insulin (H)     Mild intermittent asthma without complication     Screening examination for pulmonary tuberculosis       Past Surgical History:   Procedure Laterality Date     HERNIA REPAIR, INGUINAL RT/LT           Social History   Substance Use Topics     Smoking status: Never Smoker     Smokeless tobacco: Never Used     Alcohol use No       Family History   Problem Relation Age of Onset     Unknown/Adopted Mother      medical history of family is unknown.             Current Outpatient Prescriptions   Medication Sig Dispense Refill     fluticasone-salmeterol (ADVAIR) 100-50 MCG/DOSE diskus inhaler Inhale 1 puff into the lungs 2 times daily 1 Inhaler 11     montelukast (SINGULAIR) 10 MG tablet Take 1 tablet (10 mg) by mouth At Bedtime 30 tablet 11     albuterol (PROAIR HFA/PROVENTIL HFA/VENTOLIN HFA) 108 (90 BASE) MCG/ACT Inhaler Inhale 2 puffs into the lungs 4 times daily as needed 1 Inhaler 11      azithromycin (ZITHROMAX) 500 MG tablet Take 1 tablet (500 mg) by mouth daily 3 tablet 0     predniSONE (DELTASONE) 20 MG tablet Take 1 tablet (20 mg) by mouth 2 times daily 14 tablet 0     glipiZIDE (GLUCOTROL XL) 10 MG 24 hr tablet Take 1 tablet (10 mg) by mouth daily 30 tablet 11     blood glucose monitoring (NO BRAND SPECIFIED) test strip Use to test blood sugars two times daily or as directed 100 each 0     Cyanocobalamin (B-12) 1000 MCG TBCR Take 1,000 mcg by mouth daily 90 tablet 2     sitagliptin (JANUVIA) 50 MG tablet Take 1 tablet (50 mg) by mouth daily 90 tablet 0     Cholecalciferol (VITAMIN D) 2000 UNITS tablet Take 2,000 Units by mouth daily 100 tablet 3     pioglitazone (ACTOS) 15 MG tablet Take 1 tablet (15 mg) by mouth daily 90 tablet 1     senna-docusate (SENOKOT-S;PERICOLACE) 8.6-50 MG per tablet Take 2 tablets by mouth 2 times daily 120 tablet 11     triamcinolone (KENALOG) 0.1 % cream Apply sparingly to affected area three times daily as needed 80 g 11     atorvastatin (LIPITOR) 20 MG tablet Take 1 tablet (20 mg) by mouth daily 90 tablet 3     losartan (COZAAR) 25 MG tablet Take 1 tablet (25 mg) by mouth daily 90 tablet 3     omeprazole (PRILOSEC) 40 MG capsule Take 1 capsule (40 mg) by mouth daily Take 30-60 minutes before a meal. 91 capsule 3     tamsulosin (FLOMAX) 0.4 MG capsule Take 1 capsule (0.4 mg) by mouth daily 90 capsule 3     metFORMIN modified (GLUMETZA) 1000 MG 24 hr tablet Take 1 tablet (1,000 mg) by mouth daily (with dinner) 30 tablet 11     finasteride (PROSCAR) 5 MG tablet Take 1 tablet (5 mg) by mouth daily 90 tablet 3     valACYclovir (VALTREX) 1000 mg tablet Take 1 tablet (1,000 mg) by mouth 3 times daily 21 tablet 0     aspirin 81 MG EC tablet Take 1 tablet (81 mg) by mouth daily 90 tablet 3     acetaminophen 500 MG CAPS Take 1 tablet by mouth 4 times daily 120 capsule 11     order for DME Lancets.  Four times daily and prn. Fill per patient's insurance.      Microlet  lancets 400 each 1     order for DME Equipment being ordered: Nebulizer  ONE  USE Q 4 HOURS PRN EXACERBATION OF ASTHMA ML  ALBUTEROL 2.5MG PER 3ML   (J45.20) Mild intermittent asthma without complication  (primary encounter diagnosis)    MAKI MORA JR., MD 1 each 1     ipratropium - albuterol 0.5 mg/2.5 mg/3 mL (DUONEB) 0.5-2.5 (3) MG/3ML nebulization Take 1 vial (3 mLs) by nebulization every 6 hours as needed for shortness of breath / dyspnea or wheezing 2.5 mL o     blood glucose monitoring (NO BRAND SPECIFIED) meter device kit Use to test blood sugar two times daily or as directed. 1 kit 0     diclofenac (VOLTAREN) 1 % GEL Apply 4 grams to knees or 2 grams to hands four times daily using enclosed dosing card. 100 g 1     blood glucose monitoring (ONE TOUCH DELICA) lancets Use to test blood sugars  TWICE DAILY    or as directed. 3 Box prn     lidocaine (XYLOCAINE) 5 % ointment One application 4 x daily to affected areas lower back and knees if necessary 300 g 0     tadalafil (CIALIS) 5 MG tablet Take 1 tablet (5 mg) by mouth daily Never use with nitroglycerin, terazosin or doxazosin. 90 tablet 0     triamcinolone (KENALOG) 0.1 % paste Take by mouth 2 times daily 5 g 11     [DISCONTINUED] montelukast (SINGULAIR) 10 MG tablet Take 1 tablet (10 mg) by mouth At Bedtime 30 tablet 11     [DISCONTINUED] glipiZIDE (GLUCOTROL XL) 10 MG 24 hr tablet Take 1 tablet (10 mg) by mouth daily 90 tablet 1     [DISCONTINUED] fluticasone-salmeterol (ADVAIR) 100-50 MCG/DOSE diskus inhaler Inhale 1 puff into the lungs 2 times daily (Patient not taking: Reported on 3/6/2018) 1 Inhaler 3     [DISCONTINUED] albuterol (PROAIR HFA/PROVENTIL HFA/VENTOLIN HFA) 108 (90 BASE) MCG/ACT Inhaler Inhale 2 puffs into the lungs 4 times daily as needed 1 Inhaler 11           Recent Labs   Lab Test  03/06/18   1608  11/17/17   1054  09/12/17   1227   10/31/16   1459  03/11/16   1000   03/11/15   1054   A1C  7.3*  8.0*  8.0*   < >  6.7*  9.7*   <  >  7.9*   LDL   --    --   74   --   85  135*   --   78   HDL   --    --   44   --   47  55   --   49   TRIG   --    --   158*   --   146  122   --   134   ALT   --    --   30   --   28   --    --   19   CR   --    --   1.09   --   1.04  1.00   < >  1.00   GFRESTIMATED   --    --   66   --   70  73   < >  74   GFRESTBLACK   --    --   80   --   85  89   < >  89   POTASSIUM   --    --   4.1   --   4.4  5.3   < >  4.2   TSH   --    --   3.75   --    --   4.36   --    --     < > = values in this interval not displayed.            BP Readings from Last 6 Encounters:   03/06/18 106/54   11/17/17 126/60   11/03/17 120/64   09/12/17 128/68   05/22/17 104/54   05/15/17 110/56           Wt Readings from Last 3 Encounters:   03/06/18 247 lb (112 kg)   11/17/17 245 lb (111.1 kg)   11/03/17 243 lb (110.2 kg)                 Positive symptoms or findings indicated by bold designation:         ROS: 10 point ROS neg other than the symptoms noted above in the HPI.except  has Health Care Home; Cataract; Elevated prostate specific antigen (PSA); Psychophysiological insomnia; Vitamin D deficiency; Hypertrophy of prostate without urinary obstruction; Memory loss; Anxiety state; Gastroesophageal reflux disease without esophagitis; Primary localized osteoarthrosis, lower leg; Vitamin D insufficiency; Chronic idiopathic constipation; Vitamin B 12 deficiency; Hypertension goal BP (blood pressure) < 140/80; Chronic fatigue; Hyperlipidemia LDL goal <100; GERD (gastroesophageal reflux disease); Mild persistent asthma without complication; Diabetic neuropathy (H); Lumbago; ACP (advance care planning); Type 2 diabetes mellitus without complication, without long-term current use of insulin (H); Mild intermittent asthma without complication; and Screening examination for pulmonary tuberculosis on his problem list.  Review Of Systems    Skin: negative for, pigmentation, acne, rash, scaling, itching    Eyes: negative    Ears/Nose/Throat:  "negative    Respiratory:  SEE HISTORY OF PRESENT ILLNESS     Cardiovascular: negative    Gastrointestinal: heartburn    Genitourinary: nocturia    Musculoskeletal: back pain and arthritis    Neurologic: numbness or tingling of feet    Psychiatric: negative    Hematologic/Lymphatic/Immunologic: negative    Endocrine: diabetes                PE:  /54 (Cuff Size: Adult Large)  Pulse 83  Temp 97.1  F (36.2  C) (Tympanic)  Resp 20  Ht 5' 9.5\" (1.765 m)  Wt 247 lb (112 kg)  SpO2 96%  BMI 35.95 kg/m2 Body mass index is 35.95 kg/(m^2).        Constitutional: general appearance, well nourished, well developed, in no acute distress, well developed, appears stated age, normal body habitus,  MODERATE OBESITY         Eyes:; The patient has normal eyelids sclerae and conjunctivae :          Ears/Nose/Throat: external ear, overall: normal appearance; external nose, overall: benign appearance, normal moujth gums and lips           Neck: thyroid, overall: normal size, normal consistency, nontender,          Respiratory:  palpation of chest, overall: normal excursion,    Clear to percussion and auscultation     NO Tachypnea    NORMAL  Color          Cardiovascular:  Good color with no peripheral edema    Regular sinus rhythm without murmur.  Physiologic heart sounds   Heart is unelarged    .     Chest/Breast: normal shape           Abdominal exam,  Liver and spleen are  unenlarged        Tenderness    Scars  NORMAL             Urogenital; no renal, flank or bladder  tenderness;  NORMAL          Lymphatic: neck nodes,  NORMAL    Other nodes NORMAL          Musculoskeletal:  Brief ortho exam normal except:   LOWER BACK PAIN         Integument: inspection of skin, no rash, lesions; and, palpation, no induration, no tenderness.          Neurologic mental status, overall: alert and oriented; gait, no ataxia, no unsteadiness; coordination, no tremors; cranial nerves, overall: normal motor, overall: normal bulk, tone.  "         Psychiatric: orientation/consciousness, overall: oriented to person, place and time; behavior/psychomotor activity, no tics, normal psychomotor activity; mood and affect, overall: normal mood and affect; appearance, overall: well-groomed, good eye contact; speech, overall: normal quality, no aphasia and normal quality, quantity, intact.        Diagnostic Test Results:  Results for orders placed or performed in visit on 03/06/18   Hemoglobin A1c   Result Value Ref Range    Hemoglobin A1C 7.3 (H) 4.3 - 6.0 %           ICD-10-CM    1. Acute bronchitis, unspecified organism J20.9 fluticasone-salmeterol (ADVAIR) 100-50 MCG/DOSE diskus inhaler     azithromycin (ZITHROMAX) 500 MG tablet   2. Chronic seasonal allergic rhinitis due to pollen J30.1 montelukast (SINGULAIR) 10 MG tablet   3. Mild persistent asthma without complication J45.30 montelukast (SINGULAIR) 10 MG tablet   4. Wheezing R06.2 albuterol (PROAIR HFA/PROVENTIL HFA/VENTOLIN HFA) 108 (90 BASE) MCG/ACT Inhaler   5. Mild intermittent asthma with acute exacerbation J45.21 albuterol (PROAIR HFA/PROVENTIL HFA/VENTOLIN HFA) 108 (90 BASE) MCG/ACT Inhaler     predniSONE (DELTASONE) 20 MG tablet   6. Type 2 diabetes mellitus with diabetic nephropathy, without long-term current use of insulin (H) E11.21 glipiZIDE (GLUCOTROL XL) 10 MG 24 hr tablet     Hemoglobin A1c              .    Side effects benefits and risks thoroughly discussed. .he may come in early if unimproved or getting worse          Please drink 2 glasses of water prior to meals and walk 15-30 minutes after meals        I spent  25 MINUTES SPENT  with patient discussing the following issues   Diagnoses of Acute bronchitis, unspecified organism, Chronic seasonal allergic rhinitis due to pollen, Mild persistent asthma without complication, Wheezing, Mild intermittent asthma with acute exacerbation, and Type 2 diabetes mellitus with diabetic nephropathy, without long-term current use of insulin (H)  were pertinent to this visit. over half of which involved counseling and coordination of care.      Patient Instructions   (J20.9) Acute bronchitis, unspecified organism  Comment:    Plan: fluticasone-salmeterol (ADVAIR) 100-50 MCG/DOSE        diskus inhaler, azithromycin (ZITHROMAX) 500 MG        tablet             (J30.1) Chronic seasonal allergic rhinitis due to pollen  Comment:    Plan: montelukast (SINGULAIR) 10 MG tablet             (J45.30) Mild persistent asthma without complication  Comment:    Plan: montelukast (SINGULAIR) 10 MG tablet             (R06.2) Wheezing  Comment:    Plan: albuterol (PROAIR HFA/PROVENTIL HFA/VENTOLIN         HFA) 108 (90 BASE) MCG/ACT Inhaler             (J45.21) Mild intermittent asthma with acute exacerbation  Comment:    Plan: albuterol (PROAIR HFA/PROVENTIL HFA/VENTOLIN         HFA) 108 (90 BASE) MCG/ACT Inhaler, predniSONE         (DELTASONE) 20 MG tablet             (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, without long-term current use of insulin (H)  Comment:    Plan: glipiZIDE (GLUCOTROL XL) 10 MG 24 hr tablet                             ALL THE ABOVE PROBLEMS ARE STABLE AND MED CHANGES AS NOTED        Diet:  MEDITERRANEAN DIET AND DIABETES         Exercise:  WELL CONTROLLED   Exercises Range of motion, balance, isometric, and strengthening exercises 30 repetitions twice daily of involved joints            .MAKI MORA MD 3/6/2018 6:47 PM  March 6, 2018

## 2018-03-06 NOTE — NURSING NOTE
"Chief Complaint   Patient presents with     Cough       Initial /54 (Cuff Size: Adult Large)  Pulse 83  Temp 97.1  F (36.2  C) (Tympanic)  Resp 20  Ht 5' 9.5\" (1.765 m)  Wt 247 lb (112 kg)  SpO2 96%  BMI 35.95 kg/m2 Estimated body mass index is 35.95 kg/(m^2) as calculated from the following:    Height as of this encounter: 5' 9.5\" (1.765 m).    Weight as of this encounter: 247 lb (112 kg).  Medication Reconciliation: complete   Katina Marie CMA    "

## 2018-03-06 NOTE — LETTER
March 7, 2018      [facility]  [address] [place] [id number]        Dear ,    We are writing to inform you of your test results.    Your test results fall within the expected range(s) or remain unchanged from previous results.  Please continue with current treatment plan.    Resulted Orders   Hemoglobin A1c   Result Value Ref Range    Hemoglobin A1C 7.3 (H) 4.3 - 6.0 %       If you have any questions or concerns, please call the clinic at the number listed above.      RETURN TO CLINIC  3-4 MONTHS FOLLOW UP   DIABETES   Sincerely,        MAKI MORA MD

## 2018-03-06 NOTE — MR AVS SNAPSHOT
After Visit Summary   3/6/2018    Suze Devlin    MRN: [id number]           Patient Information     Date Of Birth          1945        Visit Information        Provider Department      3/6/2018 4:15 PM Zack Hudson MD; [employer] [facility]        Today's Diagnoses     Acute bronchitis, unspecified organism        Chronic seasonal allergic rhinitis due to pollen        Mild persistent asthma without complication        Wheezing        Mild intermittent asthma with acute exacerbation        Type 2 diabetes mellitus with diabetic nephropathy, without long-term current use of insulin (H)          Care Instructions    (J20.9) Acute bronchitis, unspecified organism  Comment:    Plan: fluticasone-salmeterol (ADVAIR) 100-50 MCG/DOSE        diskus inhaler, azithromycin (ZITHROMAX) 500 MG        tablet             (J30.1) Chronic seasonal allergic rhinitis due to pollen  Comment:    Plan: montelukast (SINGULAIR) 10 MG tablet             (J45.30) Mild persistent asthma without complication  Comment:    Plan: montelukast (SINGULAIR) 10 MG tablet             (R06.2) Wheezing  Comment:    Plan: albuterol (PROAIR HFA/PROVENTIL HFA/VENTOLIN         HFA) 108 (90 BASE) MCG/ACT Inhaler             (J45.21) Mild intermittent asthma with acute exacerbation  Comment:    Plan: albuterol (PROAIR HFA/PROVENTIL HFA/VENTOLIN         HFA) 108 (90 BASE) MCG/ACT Inhaler, predniSONE         (DELTASONE) 20 MG tablet             (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, without long-term current use of insulin (H)  Comment:    Plan: glipiZIDE (GLUCOTROL XL) 10 MG 24 hr tablet                           Follow-ups after your visit        Follow-up notes from your care team     Return in about 1 week (around 3/13/2018).      Who to contact     If you have questions or need follow up information about today's clinic visit or your schedule  "please contact [facility] directly at [phone].  Normal or non-critical lab and imaging results will be communicated to you by MyChart, letter or phone within 4 business days after the clinic has received the results. If you do not hear from us within 7 days, please contact the clinic through MyChart or phone. If you have a critical or abnormal lab result, we will notify you by phone as soon as possible.  Submit refill requests through [employer] or call your pharmacy and they will forward the refill request to us. Please allow 3 business days for your refill to be completed.          Additional Information About Your Visit        [employer] Information     Shopperception lets you send messages to your doctor, view your test results, renew your prescriptions, schedule appointments and more. To sign up, go to [email or web address] . Click on \"Log in\" on the left side of the screen, which will take you to the Welcome page. Then click on \"Sign up Now\" on the right side of the page.     You will be asked to enter the access code listed below, as well as some personal information. Please follow the directions to create your username and password.     Your access code is: [email or web address]  Expires: 2018  5:01 PM     Your access code will  in 90 days. If you need help or a new code, please call your [place] clinic or [phone].        Care EveryWhere ID     This is your Care EveryWhere ID. This could be used by other organizations to access your [place] medical records  [id number]        Your Vitals Were     Pulse Temperature Respirations Height Pulse Oximetry BMI (Body Mass Index)    83 97.1  F (36.2  C) (Tympanic) 20 5' 9.5\" (1.765 m) 96% 35.95 kg/m2       Blood Pressure from Last 3 Encounters:   18 106/54   17 126/60   17 120/64    Weight from Last 3 Encounters:   18 247 lb (112 kg)   17 245 lb (111.1 kg)   17 243 lb (110.2 kg)            "   Today, you had the following     No orders found for display         Today's Medication Changes          These changes are accurate as of 3/6/18  5:01 PM.  If you have any questions, ask your nurse or doctor.               Start taking these medicines.        Dose/Directions    azithromycin 500 MG tablet   Commonly known as:  ZITHROMAX   Used for:  Acute bronchitis, unspecified organism   Started by:  Zack Hudson MD        Dose:  500 mg   Take 1 tablet (500 mg) by mouth daily   Quantity:  3 tablet   Refills:  0       predniSONE 20 MG tablet   Commonly known as:  DELTASONE   Used for:  Mild intermittent asthma with acute exacerbation   Started by:  Zack Hudson MD        Dose:  20 mg   Take 1 tablet (20 mg) by mouth 2 times daily   Quantity:  14 tablet   Refills:  0            Where to get your medicines      These medications were sent to [facility], [address] [address]     Phone:  [phone]     albuterol 108 (90 BASE) MCG/ACT Inhaler    azithromycin 500 MG tablet    fluticasone-salmeterol 100-50 MCG/DOSE diskus inhaler    glipiZIDE 10 MG 24 hr tablet    montelukast 10 MG tablet    predniSONE 20 MG tablet                Primary Care Provider Office Phone # Fax #    Zack Hudson [phone] [address] [facility] [address]        Equal Access to Services     CHRISTIANO FRANCIS AH: Hadii leni mccracken hadasho Sochristineali, waaxda luqadaha, qaybta kaalmada erich, lisa shaw. So [facility] [phone].    ATENCIÓN: Si habla español, tiene a wells disposición servicios gratuitos de asistencia lingüística. Glenna al [phone].    We comply with applicable federal civil rights laws and [place] laws. We do not discriminate on the basis of race, color, national origin, age, disability, sex, sexual orientation, or gender identity.            Thank you!     Thank  you for choosing [facility]  for your care. Our goal is always to provide you with excellent care. Hearing back from our patients is one way we can continue to improve our services. Please take a few minutes to complete the written survey that you may receive in the mail after your visit with us. Thank you!             Your Updated Medication List - Protect others around you: Learn how to safely use, store and throw away your medicines at www.disposemymeds.org.          This list is accurate as of 3/6/18  5:01 PM.  Always use your most recent med list.                   Brand Name Dispense Instructions for use Diagnosis    acetaminophen 500 MG Caps     120 capsule    Take 1 tablet by mouth 4 times daily    Primary osteoarthritis of both knees       albuterol 108 (90 BASE) MCG/ACT Inhaler    PROAIR HFA/PROVENTIL HFA/VENTOLIN HFA    1 Inhaler    Inhale 2 puffs into the lungs 4 times daily as needed    Wheezing, Mild intermittent asthma with acute exacerbation       aspirin 81 MG EC tablet     90 tablet    Take 1 tablet (81 mg) by mouth daily    Hyperlipidemia LDL goal <100       atorvastatin 20 MG tablet    LIPITOR    90 tablet    Take 1 tablet (20 mg) by mouth daily    Hyperlipidemia LDL goal <100, Flatulence, eructation, and gas pain       azithromycin 500 MG tablet    ZITHROMAX    3 tablet    Take 1 tablet (500 mg) by mouth daily    Acute bronchitis, unspecified organism       B-12 1000 MCG Tbcr     90 tablet    Take 1,000 mcg by mouth daily    Vitamin B 12 deficiency       blood glucose monitoring lancets     3 Box    Use to test blood sugars  TWICE DAILY    or as directed.    Type 2 diabetes mellitus without complication (H)       blood glucose monitoring meter device kit    no brand specified    1 kit    Use to test blood sugar two times daily or as directed.    Type 2 diabetes mellitus with diabetic nephropathy (H)       blood glucose monitoring test strip    no brand  specified    100 each    Use to test blood sugars two times daily or as directed    Type 2 diabetes mellitus without complication, without long-term current use of insulin (H)       diclofenac 1 % Gel topical gel    VOLTAREN    100 g    Apply 4 grams to knees or 2 grams to hands four times daily using enclosed dosing card.    Primary osteoarthritis of left knee       finasteride 5 MG tablet    PROSCAR    90 tablet    Take 1 tablet (5 mg) by mouth daily    BPH associated with nocturia       fluticasone-salmeterol 100-50 MCG/DOSE diskus inhaler    ADVAIR    1 Inhaler    Inhale 1 puff into the lungs 2 times daily    Acute bronchitis, unspecified organism       glipiZIDE 10 MG 24 hr tablet    GLUCOTROL XL    30 tablet    Take 1 tablet (10 mg) by mouth daily    Type 2 diabetes mellitus with diabetic nephropathy, without long-term current use of insulin (H)       ipratropium - albuterol 0.5 mg/2.5 mg/3 mL 0.5-2.5 (3) MG/3ML neb solution    DUONEB    2.5 mL    Take 1 vial (3 mLs) by nebulization every 6 hours as needed for shortness of breath / dyspnea or wheezing    Acute bronchitis due to other specified organisms, Wheezing, Mild intermittent asthma with acute exacerbation       lidocaine 5 % ointment    XYLOCAINE    300 g    One application 4 x daily to affected areas lower back and knees if necessary    Midline low back pain with sciatica, sciatica laterality unspecified       losartan 25 MG tablet    COZAAR    90 tablet    Take 1 tablet (25 mg) by mouth daily    Hypertension goal BP (blood pressure) < 140/80       metFORMIN modified 1000 MG 24 hr tablet    GLUMETZA    30 tablet    Take 1 tablet (1,000 mg) by mouth daily (with dinner)    Slow transit constipation       montelukast 10 MG tablet    SINGULAIR    30 tablet    Take 1 tablet (10 mg) by mouth At Bedtime    Chronic seasonal allergic rhinitis due to pollen, Mild persistent asthma without complication       omeprazole 40 MG capsule    priLOSEC    91 capsule     Take 1 capsule (40 mg) by mouth daily Take 30-60 minutes before a meal.    Flatulence, eructation and gas pain, Gastroesophageal reflux disease without esophagitis       * order for DME     1 each    Equipment being ordered: Nebulizer ONE USE Q 4 HOURS PRN EXACERBATION OF ASTHMA ML ALBUTEROL 2.5MG PER 3ML  (J45.20) Mild intermittent asthma without complication  (primary encounter diagnosis)  MAKI MORA JR., MD    Mild intermittent asthma without complication       * order for DME     400 each    Lancets.  Four times daily and prn. Fill per patient's insurance.   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)       predniSONE 20 MG tablet    DELTASONE    14 tablet    Take 1 tablet (20 mg) by mouth 2 times daily    Mild intermittent asthma with acute exacerbation       senna-docusate 8.6-50 MG per tablet    SENOKOT-S;PERICOLACE    120 tablet    Take 2 tablets by mouth 2 times daily    Slow transit constipation       sitagliptin 50 MG tablet    JANUVIA    90 tablet    Take 1 tablet (50 mg) by mouth daily    Type 2 diabetes mellitus with diabetic nephropathy, without long-term current use of insulin (H)       tadalafil 5 MG tablet    CIALIS    90 tablet    Take 1 tablet (5 mg) by mouth daily Never use with nitroglycerin, terazosin or doxazosin.    Sexual dysfunction       tamsulosin 0.4 MG capsule    FLOMAX    90 capsule    Take 1 capsule (0.4 mg) by mouth daily    Nocturia       triamcinolone 0.1 % cream    KENALOG    80 g    Apply sparingly to affected area three times daily as needed    Neurodermatitis, localized       triamcinolone 0.1 % paste    KENALOG    5 g    Take by mouth 2 times daily     Lesion of lip       valACYclovir 1000 mg tablet    VALTREX    21 tablet    Take 1 tablet (1,000 mg) by mouth 3 times daily    Varicella-zoster virus infection       vitamin D 2000 UNITS tablet     100 tablet    Take 2,000 Units by mouth daily    Vitamin D insufficiency       * Notice:  This list has 2 medication(s) that are the same as other medications prescribed for you. Read the directions carefully, and ask your doctor or other care provider to review them with you.

## 2018-03-16 ENCOUNTER — TELEPHONE (OUTPATIENT)
Dept: FAMILY MEDICINE | Facility: CLINIC | Age: 73
End: 2018-03-16

## 2018-03-16 ENCOUNTER — OFFICE VISIT (OUTPATIENT)
Dept: FAMILY MEDICINE | Facility: CLINIC | Age: 73
End: 2018-03-16
Payer: COMMERCIAL

## 2018-03-16 VITALS
HEART RATE: 89 BPM | RESPIRATION RATE: 20 BRPM | BODY MASS INDEX: 36.39 KG/M2 | WEIGHT: 250 LBS | SYSTOLIC BLOOD PRESSURE: 116 MMHG | DIASTOLIC BLOOD PRESSURE: 60 MMHG | TEMPERATURE: 97.3 F | OXYGEN SATURATION: 97 %

## 2018-03-16 DIAGNOSIS — I10 HYPERTENSION GOAL BP (BLOOD PRESSURE) < 140/80: Chronic | ICD-10-CM

## 2018-03-16 DIAGNOSIS — E11.9 TYPE 2 DIABETES MELLITUS WITHOUT COMPLICATION, WITHOUT LONG-TERM CURRENT USE OF INSULIN (H): Primary | Chronic | ICD-10-CM

## 2018-03-16 DIAGNOSIS — J45.30 MILD PERSISTENT ASTHMA WITHOUT COMPLICATION: Chronic | ICD-10-CM

## 2018-03-16 DIAGNOSIS — E78.5 HYPERLIPIDEMIA LDL GOAL <100: Chronic | ICD-10-CM

## 2018-03-16 PROCEDURE — 99214 OFFICE O/P EST MOD 30 MIN: CPT | Performed by: FAMILY MEDICINE

## 2018-03-16 NOTE — MR AVS SNAPSHOT
After Visit Summary   3/16/2018    Suze Devlin    MRN: [id number]           Patient Information     Date Of Birth          1945        Visit Information        Provider Department      3/16/2018 3:45 PM Zack Hudson MD; [employer] [facility]        Today's Diagnoses     Type 2 diabetes mellitus without complication, without long-term current use of insulin (H)    -  1    Mild persistent asthma without complication        Hyperlipidemia LDL goal <100        Hypertension goal BP (blood pressure) < 140/80          Care Instructions    (E11.9) Type 2 diabetes mellitus without complication, without long-term current use of insulin (H)  (primary encounter diagnosis)  Comment:    Plan:      (J45.30) Mild persistent asthma without complication  Comment: MARKED IMPROVEMENT   Plan:      (E78.5) Hyperlipidemia LDL goal <100  Comment:    Plan:      (I10) Hypertension goal BP (blood pressure) < 140/80  Comment:    Plan:                     Follow-ups after your visit        Follow-up notes from your care team     Return in about 3 months (around 6/16/2018).      Who to contact     If you have questions or need follow up information about today's clinic visit or your schedule please contact [facility] directly at [phone].  Normal or non-critical lab and imaging results will be communicated to you by MyChart, letter or phone within 4 business days after the clinic has received the results. If you do not hear from us within 7 days, please contact the clinic through MyChart or phone. If you have a critical or abnormal lab result, we will notify you by phone as soon as possible.  Submit refill requests through [employer]ZOOM or call your pharmacy and they will forward the refill request to us. Please allow 3 business days for your refill to be completed.          Additional Information About Your Visit       "  MyChart Information     [employer] lets you send messages to your doctor, view your test results, renew your prescriptions, schedule appointments and more. To sign up, go to [email or web address] . Click on \"Log in\" on the left side of the screen, which will take you to the Welcome page. Then click on \"Sign up Now\" on the right side of the page.     You will be asked to enter the access code listed below, as well as some personal information. Please follow the directions to create your username and password.     Your access code is: [email or web address]  Expires: 2018  6:01 PM     Your access code will  in 90 days. If you need help or a new code, please call your [place] clinic or [phone].        Care EveryWhere ID     This is your Care EveryWhere ID. This could be used by other organizations to access your [place] medical records  [id number]        Your Vitals Were     Pulse Temperature Respirations Pulse Oximetry BMI (Body Mass Index)       89 97.3  F (36.3  C) (Tympanic) 20 97% 36.39 kg/m2        Blood Pressure from Last 3 Encounters:   18 116/60   18 106/54   17 126/60    Weight from Last 3 Encounters:   18 250 lb (113.4 kg)   18 247 lb (112 kg)   17 245 lb (111.1 kg)              Today, you had the following     No orders found for display       Primary Care Provider Office Phone # Fax #    Zack Myriam Hudson [phone]       [address] [facility] [address]        Equal Access to Services     [facility] : Hadii leni Carias, warosarioda luqadaha, qaybta kaalmada lisa jung . So [facility] [phone].    ATENCIÓN: Si habla español, tiene a wells disposición servicios gratuitos de asistencia lingüística. Llame al [phone].    We comply with applicable federal civil rights laws and [place] laws. We do not discriminate on the basis of race, color, national origin, age, disability, sex, " sexual orientation, or gender identity.            Thank you!     Thank you for choosing [facility]  for your care. Our goal is always to provide you with excellent care. Hearing back from our patients is one way we can continue to improve our services. Please take a few minutes to complete the written survey that you may receive in the mail after your visit with us. Thank you!             Your Updated Medication List - Protect others around you: Learn how to safely use, store and throw away your medicines at www.disposemymeds.org.          This list is accurate as of 3/16/18  3:58 PM.  Always use your most recent med list.                   Brand Name Dispense Instructions for use Diagnosis    acetaminophen 500 MG Caps     120 capsule    Take 1 tablet by mouth 4 times daily    Primary osteoarthritis of both knees       albuterol 108 (90 BASE) MCG/ACT Inhaler    PROAIR HFA/PROVENTIL HFA/VENTOLIN HFA    1 Inhaler    Inhale 2 puffs into the lungs 4 times daily as needed    Wheezing, Mild intermittent asthma with acute exacerbation       aspirin 81 MG EC tablet     90 tablet    Take 1 tablet (81 mg) by mouth daily    Hyperlipidemia LDL goal <100       atorvastatin 20 MG tablet    LIPITOR    90 tablet    Take 1 tablet (20 mg) by mouth daily    Hyperlipidemia LDL goal <100, Flatulence, eructation, and gas pain       B-12 1000 MCG Tbcr     90 tablet    Take 1,000 mcg by mouth daily    Vitamin B 12 deficiency       blood glucose monitoring lancets     3 Box    Use to test blood sugars  TWICE DAILY    or as directed.    Type 2 diabetes mellitus without complication (H)       blood glucose monitoring meter device kit    no brand specified    1 kit    Use to test blood sugar two times daily or as directed.    Type 2 diabetes mellitus with diabetic nephropathy (H)       blood glucose monitoring test strip    no brand specified    100 each    Use to test blood sugars two times daily or as  directed    Type 2 diabetes mellitus without complication, without long-term current use of insulin (H)       diclofenac 1 % Gel topical gel    VOLTAREN    100 g    Apply 4 grams to knees or 2 grams to hands four times daily using enclosed dosing card.    Primary osteoarthritis of left knee       finasteride 5 MG tablet    PROSCAR    90 tablet    Take 1 tablet (5 mg) by mouth daily    BPH associated with nocturia       glipiZIDE 10 MG 24 hr tablet    GLUCOTROL XL    30 tablet    Take 1 tablet (10 mg) by mouth daily    Type 2 diabetes mellitus with diabetic nephropathy, without long-term current use of insulin (H)       ipratropium - albuterol 0.5 mg/2.5 mg/3 mL 0.5-2.5 (3) MG/3ML neb solution    DUONEB    2.5 mL    Take 1 vial (3 mLs) by nebulization every 6 hours as needed for shortness of breath / dyspnea or wheezing    Acute bronchitis due to other specified organisms, Wheezing, Mild intermittent asthma with acute exacerbation       lidocaine 5 % ointment    XYLOCAINE    300 g    One application 4 x daily to affected areas lower back and knees if necessary    Midline low back pain with sciatica, sciatica laterality unspecified       losartan 25 MG tablet    COZAAR    90 tablet    Take 1 tablet (25 mg) by mouth daily    Hypertension goal BP (blood pressure) < 140/80       metFORMIN modified 1000 MG 24 hr tablet    GLUMETZA    30 tablet    Take 1 tablet (1,000 mg) by mouth daily (with dinner)    Slow transit constipation       montelukast 10 MG tablet    SINGULAIR    30 tablet    Take 1 tablet (10 mg) by mouth At Bedtime    Chronic seasonal allergic rhinitis due to pollen, Mild persistent asthma without complication       omeprazole 40 MG capsule    priLOSEC    91 capsule    Take 1 capsule (40 mg) by mouth daily Take 30-60 minutes before a meal.    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    Sexual dysfunction       tamsulosin 0.4 MG capsule    FLOMAX    90 capsule    Take 1 capsule (0.4 mg) by mouth daily    Nocturia       triamcinolone 0.1 % cream    KENALOG    80 g    Apply sparingly to affected area three times daily as needed    Neurodermatitis, localized       triamcinolone 0.1 % paste    KENALOG    5 g    Take by mouth 2 times daily    Lesion of lip       valACYclovir 1000 mg tablet    VALTREX    21 tablet    Take 1 tablet (1,000 mg) by mouth 3 times daily    Varicella-zoster virus infection       vitamin D 2000 UNITS tablet     100 tablet    Take 2,000 Units by mouth daily    Vitamin D insufficiency

## 2018-03-16 NOTE — PROGRESS NOTES
SUBJECTIVE:   Suze Devlin is a 73 year old male who presents to clinic today for the following health issues:      FOLLOW UP BRONCHITIS      Duration: EARLY MARCH    FEELING BETTER    Description (location/character/radiation): IMPROVED ACUTE BRONCHITIS     RESPONDED TO BRONHITIS     WHEEZING IS GONE     NO RHINITIS     ACHINESS IS GONE       Acute Illness   Acute illness concerns: cough  Onset: 2 months          HISTORY OF ELEVATED  PSA OR PROSTATE CANCER SCREENING TEST,     INSOMNIA ONGOING INTERMITTENT    VITAMIN D REPLACEMENT      IMPROVED MEMORY LOSS     ANXIETY AND DEPRESSION     GASTROESOPHAGEAL REFLUX DISEASE WITHOUT ESOPHAGITIS     OSTEOARTHRITIS BILATERAL KNEE PAIN     CONSTIPATION TREATMENT     VITAMIN B TREATMENT     HYPERTENSION WITH GOAL OF LESS THAN 140/80 CONTROLLED CURRENT MEDICATIONS    CHRONIC FATIGUE     DIABETES DIABETES 2 GOAL 8% WITH NUEROPATH    CHRONIC LOWER BACK PAIN     ASTHMA MILD PERSISTENT WITH EXACERBATION LAST 2 MONTHS         Diabetes Follow-up    Patient is checking blood sugars: not at all  Diabetic concerns: None   Symptoms of hypoglycemia (low blood sugar): none   Paresthesias (numbness or burning in feet) or sores: YES   Date of last diabetic eye exam:  YEARLY RECOMMENDED     Hyperlipidemia Follow-Up    Rate your low fat/cholesterol diet?: good  Taking statin?  Yes, no muscle aches from statin  Other lipid medications/supplements?:  none    Hypertension Follow-up    Outpatient blood pressures are not being checked.  Low Salt Diet: no added salt            Topic Date Due     FOOT EXAM Q1 YEAR  04/15/2017               .  Current Outpatient Prescriptions   Medication Sig Dispense Refill     montelukast (SINGULAIR) 10 MG tablet Take 1 tablet (10 mg) by mouth At Bedtime 30 tablet 11     albuterol (PROAIR HFA/PROVENTIL HFA/VENTOLIN HFA) 108 (90 BASE) MCG/ACT Inhaler Inhale 2 puffs into the lungs 4 times daily as needed 1 Inhaler 11     glipiZIDE (GLUCOTROL XL) 10 MG 24 hr  tablet Take 1 tablet (10 mg) by mouth daily 30 tablet 11     blood glucose monitoring (NO BRAND SPECIFIED) test strip Use to test blood sugars two times daily or as directed 100 each 0     Cyanocobalamin (B-12) 1000 MCG TBCR Take 1,000 mcg by mouth daily 90 tablet 2     sitagliptin (JANUVIA) 50 MG tablet Take 1 tablet (50 mg) by mouth daily 90 tablet 0     Cholecalciferol (VITAMIN D) 2000 UNITS tablet Take 2,000 Units by mouth daily 100 tablet 3     pioglitazone (ACTOS) 15 MG tablet Take 1 tablet (15 mg) by mouth daily 90 tablet 1     senna-docusate (SENOKOT-S;PERICOLACE) 8.6-50 MG per tablet Take 2 tablets by mouth 2 times daily 120 tablet 11     triamcinolone (KENALOG) 0.1 % cream Apply sparingly to affected area three times daily as needed 80 g 11     atorvastatin (LIPITOR) 20 MG tablet Take 1 tablet (20 mg) by mouth daily 90 tablet 3     losartan (COZAAR) 25 MG tablet Take 1 tablet (25 mg) by mouth daily 90 tablet 3     omeprazole (PRILOSEC) 40 MG capsule Take 1 capsule (40 mg) by mouth daily Take 30-60 minutes before a meal. 91 capsule 3     tamsulosin (FLOMAX) 0.4 MG capsule Take 1 capsule (0.4 mg) by mouth daily 90 capsule 3     metFORMIN modified (GLUMETZA) 1000 MG 24 hr tablet Take 1 tablet (1,000 mg) by mouth daily (with dinner) 30 tablet 11     finasteride (PROSCAR) 5 MG tablet Take 1 tablet (5 mg) by mouth daily 90 tablet 3     valACYclovir (VALTREX) 1000 mg tablet Take 1 tablet (1,000 mg) by mouth 3 times daily 21 tablet 0     aspirin 81 MG EC tablet Take 1 tablet (81 mg) by mouth daily 90 tablet 3     acetaminophen 500 MG CAPS Take 1 tablet by mouth 4 times daily 120 capsule 11     ipratropium - albuterol 0.5 mg/2.5 mg/3 mL (DUONEB) 0.5-2.5 (3) MG/3ML nebulization Take 1 vial (3 mLs) by nebulization every 6 hours as needed for shortness of breath / dyspnea or wheezing 2.5 mL o     blood glucose monitoring (NO BRAND SPECIFIED) meter device kit Use to test blood sugar two times daily or as directed. 1  kit 0     diclofenac (VOLTAREN) 1 % GEL Apply 4 grams to knees or 2 grams to hands four times daily using enclosed dosing card. 100 g 1     blood glucose monitoring (ONE TOUCH DELICA) lancets Use to test blood sugars  TWICE DAILY    or as directed. 3 Box prn     lidocaine (XYLOCAINE) 5 % ointment One application 4 x daily to affected areas lower back and knees if necessary 300 g 0     tadalafil (CIALIS) 5 MG tablet Take 1 tablet (5 mg) by mouth daily Never use with nitroglycerin, terazosin or doxazosin. 90 tablet 0     triamcinolone (KENALOG) 0.1 % paste Take by mouth 2 times daily 5 g 11            No Known Allergies      Immunization History   Administered Date(s) Administered     Influenza (High Dose) 3 valent vaccine 10/03/2014, 10/13/2015, 11/03/2017     Pneumococcal 23 valent 10/17/2014     TD (ADULT, 7+) 04/04/2005, 02/03/2006, 08/08/2006     TDAP Vaccine (Adacel) 10/13/2015     Varicella 04/04/2005, 02/03/2006               reports that he does not drink alcohol.        reports that he does not use illicit drugs.      family history includes Unknown/Adopted in his mother.      indicated that the status of his mother is unknown. He indicated that all of his four daughters are alive. He indicated that all of his five sons are alive.        has a past surgical history that includes hernia repair, inguinal rt/lt.       reports that he does not engage in sexual activity.    .  Pediatric History   Patient Guardian Status     Not on file.     Other Topics Concern     Parent/Sibling W/ Cabg, Mi Or Angioplasty Before 65f 55m? No     Social History Narrative             reports that he has never smoked. He has never used smokeless tobacco.        Medical, social, surgical, and family histories reviewed.        Labs reviewed in EPIC  Patient Active Problem List   Diagnosis     Health Care Home     Cataract     Elevated prostate specific antigen (PSA)     Psychophysiological insomnia     Vitamin D deficiency      Hypertrophy of prostate without urinary obstruction     Memory loss     Anxiety state     Gastroesophageal reflux disease without esophagitis     Primary localized osteoarthrosis, lower leg     Vitamin D insufficiency     Chronic idiopathic constipation     Vitamin B 12 deficiency     Hypertension goal BP (blood pressure) < 140/80     Chronic fatigue     Hyperlipidemia LDL goal <100     GERD (gastroesophageal reflux disease)     Mild persistent asthma without complication     Diabetic neuropathy (H)     Lumbago     ACP (advance care planning)     Type 2 diabetes mellitus without complication, without long-term current use of insulin (H)     Mild intermittent asthma without complication     Screening examination for pulmonary tuberculosis       Past Surgical History:   Procedure Laterality Date     HERNIA REPAIR, INGUINAL RT/LT           Social History   Substance Use Topics     Smoking status: Never Smoker     Smokeless tobacco: Never Used     Alcohol use No       Family History   Problem Relation Age of Onset     Unknown/Adopted Mother      medical history of family is unknown.             Current Outpatient Prescriptions   Medication Sig Dispense Refill     montelukast (SINGULAIR) 10 MG tablet Take 1 tablet (10 mg) by mouth At Bedtime 30 tablet 11     albuterol (PROAIR HFA/PROVENTIL HFA/VENTOLIN HFA) 108 (90 BASE) MCG/ACT Inhaler Inhale 2 puffs into the lungs 4 times daily as needed 1 Inhaler 11     glipiZIDE (GLUCOTROL XL) 10 MG 24 hr tablet Take 1 tablet (10 mg) by mouth daily 30 tablet 11     blood glucose monitoring (NO BRAND SPECIFIED) test strip Use to test blood sugars two times daily or as directed 100 each 0     Cyanocobalamin (B-12) 1000 MCG TBCR Take 1,000 mcg by mouth daily 90 tablet 2     sitagliptin (JANUVIA) 50 MG tablet Take 1 tablet (50 mg) by mouth daily 90 tablet 0     Cholecalciferol (VITAMIN D) 2000 UNITS tablet Take 2,000 Units by mouth daily 100 tablet 3     pioglitazone (ACTOS) 15 MG tablet  Take 1 tablet (15 mg) by mouth daily 90 tablet 1     senna-docusate (SENOKOT-S;PERICOLACE) 8.6-50 MG per tablet Take 2 tablets by mouth 2 times daily 120 tablet 11     triamcinolone (KENALOG) 0.1 % cream Apply sparingly to affected area three times daily as needed 80 g 11     atorvastatin (LIPITOR) 20 MG tablet Take 1 tablet (20 mg) by mouth daily 90 tablet 3     losartan (COZAAR) 25 MG tablet Take 1 tablet (25 mg) by mouth daily 90 tablet 3     omeprazole (PRILOSEC) 40 MG capsule Take 1 capsule (40 mg) by mouth daily Take 30-60 minutes before a meal. 91 capsule 3     tamsulosin (FLOMAX) 0.4 MG capsule Take 1 capsule (0.4 mg) by mouth daily 90 capsule 3     metFORMIN modified (GLUMETZA) 1000 MG 24 hr tablet Take 1 tablet (1,000 mg) by mouth daily (with dinner) 30 tablet 11     finasteride (PROSCAR) 5 MG tablet Take 1 tablet (5 mg) by mouth daily 90 tablet 3     valACYclovir (VALTREX) 1000 mg tablet Take 1 tablet (1,000 mg) by mouth 3 times daily 21 tablet 0     aspirin 81 MG EC tablet Take 1 tablet (81 mg) by mouth daily 90 tablet 3     acetaminophen 500 MG CAPS Take 1 tablet by mouth 4 times daily 120 capsule 11     ipratropium - albuterol 0.5 mg/2.5 mg/3 mL (DUONEB) 0.5-2.5 (3) MG/3ML nebulization Take 1 vial (3 mLs) by nebulization every 6 hours as needed for shortness of breath / dyspnea or wheezing 2.5 mL o     blood glucose monitoring (NO BRAND SPECIFIED) meter device kit Use to test blood sugar two times daily or as directed. 1 kit 0     diclofenac (VOLTAREN) 1 % GEL Apply 4 grams to knees or 2 grams to hands four times daily using enclosed dosing card. 100 g 1     blood glucose monitoring (ONE TOUCH DELICA) lancets Use to test blood sugars  TWICE DAILY    or as directed. 3 Box prn     lidocaine (XYLOCAINE) 5 % ointment One application 4 x daily to affected areas lower back and knees if necessary 300 g 0     tadalafil (CIALIS) 5 MG tablet Take 1 tablet (5 mg) by mouth daily Never use with nitroglycerin,  terazosin or doxazosin. 90 tablet 0     triamcinolone (KENALOG) 0.1 % paste Take by mouth 2 times daily 5 g 11         Recent Labs   Lab Test  03/06/18   1608  11/17/17   1054  09/12/17   1227   10/31/16   1459  03/11/16   1000   03/11/15   1054   A1C  7.3*  8.0*  8.0*   < >  6.7*  9.7*   < >  7.9*   LDL   --    --   74   --   85  135*   --   78   HDL   --    --   44   --   47  55   --   49   TRIG   --    --   158*   --   146  122   --   134   ALT   --    --   30   --   28   --    --   19   CR   --    --   1.09   --   1.04  1.00   < >  1.00   GFRESTIMATED   --    --   66   --   70  73   < >  74   GFRESTBLACK   --    --   80   --   85  89   < >  89   POTASSIUM   --    --   4.1   --   4.4  5.3   < >  4.2   TSH   --    --   3.75   --    --   4.36   --    --     < > = values in this interval not displayed.            BP Readings from Last 6 Encounters:   03/16/18 116/60   03/06/18 106/54   11/17/17 126/60   11/03/17 120/64   09/12/17 128/68   05/22/17 104/54         Wt Readings from Last 3 Encounters:   03/16/18 250 lb (113.4 kg)   03/06/18 247 lb (112 kg)   11/17/17 245 lb (111.1 kg)               Positive symptoms or findings indicated by bold designation:         ROS: 10 point ROS neg other than the symptoms noted above in the HPI.except  has Health Care Home; Cataract; Elevated prostate specific antigen (PSA); Psychophysiological insomnia; Vitamin D deficiency; Hypertrophy of prostate without urinary obstruction; Memory loss; Anxiety state; Gastroesophageal reflux disease without esophagitis; Primary localized osteoarthrosis, lower leg; Vitamin D insufficiency; Chronic idiopathic constipation; Vitamin B 12 deficiency; Hypertension goal BP (blood pressure) < 140/80; Chronic fatigue; Hyperlipidemia LDL goal <100; GERD (gastroesophageal reflux disease); Mild persistent asthma without complication; Diabetic neuropathy (H); Lumbago; ACP (advance care planning); Type 2 diabetes mellitus without complication, without  long-term current use of insulin (H); Mild intermittent asthma without complication; and Screening examination for pulmonary tuberculosis on his problem list.  Review Of Systems    Skin: negative    Eyes: negative    Ears/Nose/Throat: negative    Respiratory: No shortness of breath, dyspnea on exertion, cough, or hemoptysis    Cardiovascular: negative    Gastrointestinal: heartburn    Genitourinary: negative    Musculoskeletal: joint pain    Neurologic: negative    Psychiatric: negative    Hematologic/Lymphatic/Immunologic: negative    Endocrine: negative and diabetes                PE:  /60 (Cuff Size: Adult Large)  Pulse 89  Temp 97.3  F (36.3  C) (Tympanic)  Resp 20  Wt 250 lb (113.4 kg)  SpO2 97%  BMI 36.39 kg/m2 Body mass index is 36.39 kg/(m^2).        Constitutional: general appearance, well nourished, well developed, in no acute distress, well developed, appears stated age, normal body habitus,         Eyes:; The patient has normal eyelids sclerae and conjunctivae :          Ears/Nose/Throat: external ear, overall: normal appearance; external nose, overall: benign appearance, normal moujth gums and lips           Neck: thyroid, overall: normal size, normal consistency, nontender,          Respiratory:  palpation of chest, overall: normal excursion,    Clear to percussion and auscultation     NO Tachypnea    NORMAL  Color          Cardiovascular:  Good color with no peripheral edema    Regular sinus rhythm without murmur.  Physiologic heart sounds   Heart is unelarged    .     Chest/Breast: normal shape           Abdominal exam,  Liver and spleen are  unenlarged        Tenderness    Scars              Urogenital; no renal, flank or bladder  tenderness;          Lymphatic: neck nodes,     Other nodes         Musculoskeletal:  Brief ortho exam normal except:   OSTEOARTHRITIS KNEES         Integument: inspection of skin, no rash, lesions; and, palpation, no induration, no tenderness.           Neurologic mental status, overall: alert and oriented; gait, no ataxia, no unsteadiness; coordination, no tremors; cranial nerves, overall: normal motor, overall: normal bulk, tone.          Psychiatric: orientation/consciousness, overall: oriented to person, place and time; behavior/psychomotor activity, no tics, normal psychomotor activity; mood and affect, overall: normal mood and affect; appearance, overall: well-groomed, good eye contact; speech, overall: normal quality, no aphasia and normal quality, quantity, intact.        Diagnostic Test Results:  Results for orders placed or performed in visit on 03/06/18   Hemoglobin A1c   Result Value Ref Range    Hemoglobin A1C 7.3 (H) 4.3 - 6.0 %           ICD-10-CM    1. Type 2 diabetes mellitus without complication, without long-term current use of insulin (H) E11.9    2. Mild persistent asthma without complication J45.30     MARKED IMPROVEMENT    3. Hyperlipidemia LDL goal <100 E78.5    4. Hypertension goal BP (blood pressure) < 140/80 I10               .    Side effects benefits and risks thoroughly discussed. .he may come in early if unimproved or getting worse          Please drink 2 glasses of water prior to meals and walk 15-30 minutes after meals        I spent 15 MINUTES   with patient discussing the following issues   The primary encounter diagnosis was Type 2 diabetes mellitus without complication, without long-term current use of insulin (H). Diagnoses of Mild persistent asthma without complication, Hyperlipidemia LDL goal <100, and Hypertension goal BP (blood pressure) < 140/80 were also pertinent to this visit. over half of which involved counseling and coordination of care.      Patient Instructions   (E11.9) Type 2 diabetes mellitus without complication, without long-term current use of insulin (H)  (primary encounter diagnosis)  Comment:    Plan:      (J45.30) Mild persistent asthma without complication  Comment: MARKED IMPROVEMENT   Plan:       (E78.5) Hyperlipidemia LDL goal <100  Comment:    Plan:      (I10) Hypertension goal BP (blood pressure) < 140/80  Comment:    Plan:                     ALL THE ABOVE PROBLEMS ARE STABLE AND MED CHANGES AS NOTED        Diet: MEDITERRANEAN DIET AND DIABETES         Exercise: AEROBIC  90 MINUTES OF EXERCISE PER DAY RECOMMENDED  U  Exercises Range of motion, balance, isometric, and strengthening exercises 30 repetitions twice daily of involved joints            .MAKI MORA MD 3/16/2018 3:51 PM  March 16, 2018

## 2018-03-16 NOTE — PATIENT INSTRUCTIONS
(E11.9) Type 2 diabetes mellitus without complication, without long-term current use of insulin (H)  (primary encounter diagnosis)  Comment:    Plan:      (J45.30) Mild persistent asthma without complication  Comment: MARKED IMPROVEMENT   Plan:      (E78.5) Hyperlipidemia LDL goal <100  Comment:    Plan:      (I10) Hypertension goal BP (blood pressure) < 140/80  Comment:    Plan:

## 2018-03-16 NOTE — TELEPHONE ENCOUNTER
Form from [facility] brought into clinic during OV. Original give to patient, copy sent to [facility]  Special Diet Request faxed to [phone]

## 2018-03-16 NOTE — NURSING NOTE
"Chief Complaint   Patient presents with     RECHECK     BRONCHITIS       Initial /60 (Cuff Size: Adult Large)  Pulse 89  Temp 97.3  F (36.3  C) (Tympanic)  Resp 20  Wt 250 lb (113.4 kg)  SpO2 97%  BMI 36.39 kg/m2 Estimated body mass index is 36.39 kg/(m^2) as calculated from the following:    Height as of 3/6/18: 5' 9.5\" (1.765 m).    Weight as of this encounter: 250 lb (113.4 kg).  Medication Reconciliation: complete   Katina Marie CMA    "

## 2018-03-23 ENCOUNTER — TELEPHONE (OUTPATIENT)
Dept: FAMILY MEDICINE | Facility: CLINIC | Age: 73
End: 2018-03-23

## 2018-03-23 NOTE — TELEPHONE ENCOUNTER
Our goal is to have forms completed within 72 hours, however some forms may require a visit or additional information.    What clinic location was the form placed at [facility] or [place].?     Who is the form from? [facility] -  Cert Period 3/3/18 - 5/1/18  Where did the form come from? Faxed to clinic   The form was placed in the inbox of Zack Hudson Jr MD      Please fax to [phone]    Additional comments:     Call take on 3/23/2018 at 11:39 AM by Grace Sherman

## 2018-03-24 DIAGNOSIS — Z53.9 DIAGNOSIS NOT YET DEFINED: Primary | ICD-10-CM

## 2018-03-24 PROCEDURE — G0179 MD RECERTIFICATION HHA PT: HCPCS | Performed by: FAMILY MEDICINE

## 2018-04-11 ENCOUNTER — PATIENT OUTREACH (OUTPATIENT)
Dept: GERIATRIC MEDICINE | Facility: CLINIC | Age: 73
End: 2018-04-11

## 2018-04-11 NOTE — PROGRESS NOTES
[facility] Care Coordination Contact  See below:   Member is is requesting to go back to previous [facility] provider  [facility].  Updated [facility] provider who are reporting that their  [employer] ID is [id number].    Also, discussed with member that member will be receiving  care plan change letter via mail . Also, this CM discussed if member wanted other POC or other information sent to providers per requirement.  Member at this time is choosing not to any information mailed to providers.  Instructed to review and sign letter as well as mail the letter back to CM.  Digna Chen RN PHN  [facility]   Phone:   [phone]  Fax:       [phone]

## 2018-04-30 ENCOUNTER — TELEPHONE (OUTPATIENT)
Dept: FAMILY MEDICINE | Facility: CLINIC | Age: 73
End: 2018-04-30

## 2018-04-30 DIAGNOSIS — E11.9 TYPE 2 DIABETES MELLITUS WITHOUT COMPLICATION, WITHOUT LONG-TERM CURRENT USE OF INSULIN (H): Primary | Chronic | ICD-10-CM

## 2018-04-30 NOTE — TELEPHONE ENCOUNTER
ABEL Arrington [facility] nurse, called requesting approval for SN orders  2 x a month for 2 months and 3 PRN visits.   Verbal approval given.    Pt misplaced his glucometer. Nurse is requesting a new order be sent to his pharmacy. This was done and pt was informed order for glucometer was sent to his pharmacy.

## 2018-05-01 RX ORDER — BLOOD-GLUCOSE METER
EACH MISCELLANEOUS
Qty: 1 KIT | Refills: 0 | Status: SHIPPED | OUTPATIENT
Start: 2018-05-01 | End: 2020-02-10

## 2018-05-30 ENCOUNTER — TELEPHONE (OUTPATIENT)
Dept: FAMILY MEDICINE | Facility: CLINIC | Age: 73
End: 2018-05-30

## 2018-05-30 NOTE — TELEPHONE ENCOUNTER
Our goal is to have forms completed within 72 hours, however some forms may require a visit or additional information.    What clinic location was the form placed at [facility] or [place].?     Who is the form from? [facility] - Cert Period 5/2/18 - 6/30/18  Where did the form come from? Faxed to clinic   The form was placed in the inbox of Zack Hudson Jr MD      Please fax to [phone]    Additional comments:     Call take on 5/30/2018 at 3:25 PM by Grace Sherman

## 2018-05-31 DIAGNOSIS — Z53.9 DIAGNOSIS NOT YET DEFINED: Primary | ICD-10-CM

## 2018-05-31 PROCEDURE — G0179 MD RECERTIFICATION HHA PT: HCPCS | Performed by: FAMILY MEDICINE

## 2018-06-01 ENCOUNTER — OFFICE VISIT (OUTPATIENT)
Dept: FAMILY MEDICINE | Facility: CLINIC | Age: 73
End: 2018-06-01
Payer: COMMERCIAL

## 2018-06-01 VITALS
BODY MASS INDEX: 35.73 KG/M2 | SYSTOLIC BLOOD PRESSURE: 118 MMHG | DIASTOLIC BLOOD PRESSURE: 62 MMHG | OXYGEN SATURATION: 98 % | WEIGHT: 245.5 LBS | HEART RATE: 82 BPM | TEMPERATURE: 98.2 F

## 2018-06-01 DIAGNOSIS — E08.49 OTHER DIABETIC NEUROLOGICAL COMPLICATION ASSOCIATED WITH DIABETES MELLITUS DUE TO UNDERLYING CONDITION (H): ICD-10-CM

## 2018-06-01 DIAGNOSIS — E11.21 CONTROLLED TYPE 2 DIABETES MELLITUS WITH MICROALBUMINURIC DIABETIC NEPHROPATHY (H): ICD-10-CM

## 2018-06-01 DIAGNOSIS — E78.5 HYPERLIPIDEMIA LDL GOAL <100: Chronic | ICD-10-CM

## 2018-06-01 DIAGNOSIS — N40.1 BPH ASSOCIATED WITH NOCTURIA: ICD-10-CM

## 2018-06-01 DIAGNOSIS — E66.01 MORBID OBESITY (H): ICD-10-CM

## 2018-06-01 DIAGNOSIS — R35.1 BPH ASSOCIATED WITH NOCTURIA: ICD-10-CM

## 2018-06-01 DIAGNOSIS — H61.23 BILATERAL IMPACTED CERUMEN: Primary | ICD-10-CM

## 2018-06-01 DIAGNOSIS — K21.9 GASTROESOPHAGEAL REFLUX DISEASE WITHOUT ESOPHAGITIS: ICD-10-CM

## 2018-06-01 DIAGNOSIS — R14.3 FLATULENCE, ERUCTATION AND GAS PAIN: ICD-10-CM

## 2018-06-01 DIAGNOSIS — E11.21 TYPE 2 DIABETES MELLITUS WITH DIABETIC NEPHROPATHY, WITHOUT LONG-TERM CURRENT USE OF INSULIN (H): Chronic | ICD-10-CM

## 2018-06-01 DIAGNOSIS — R14.2 FLATULENCE, ERUCTATION AND GAS PAIN: ICD-10-CM

## 2018-06-01 DIAGNOSIS — R14.1 FLATULENCE, ERUCTATION AND GAS PAIN: ICD-10-CM

## 2018-06-01 DIAGNOSIS — K59.01 SLOW TRANSIT CONSTIPATION: ICD-10-CM

## 2018-06-01 PROCEDURE — 99213 OFFICE O/P EST LOW 20 MIN: CPT | Performed by: FAMILY MEDICINE

## 2018-06-01 RX ORDER — FINASTERIDE 5 MG/1
5 TABLET, FILM COATED ORAL DAILY
Qty: 90 TABLET | Refills: 3 | Status: SHIPPED | OUTPATIENT
Start: 2018-06-01 | End: 2019-07-19

## 2018-06-01 RX ORDER — METFORMIN HYDROCHLORIDE 1000 MG/1
1000 TABLET, FILM COATED, EXTENDED RELEASE ORAL
Qty: 90 TABLET | Refills: 3 | Status: SHIPPED | OUTPATIENT
Start: 2018-06-01 | End: 2018-12-01

## 2018-06-01 RX ORDER — PIOGLITAZONEHYDROCHLORIDE 15 MG/1
15 TABLET ORAL DAILY
Qty: 90 TABLET | Refills: 1 | Status: SHIPPED | OUTPATIENT
Start: 2018-06-01 | End: 2018-12-01

## 2018-06-01 RX ORDER — GLIPIZIDE 10 MG/1
10 TABLET, FILM COATED, EXTENDED RELEASE ORAL DAILY
Qty: 30 TABLET | Refills: 11 | Status: CANCELLED | OUTPATIENT
Start: 2018-06-01

## 2018-06-01 RX ORDER — OMEPRAZOLE 40 MG/1
40 CAPSULE, DELAYED RELEASE ORAL DAILY
Qty: 91 CAPSULE | Refills: 3 | Status: SHIPPED | OUTPATIENT
Start: 2018-06-01 | End: 2019-07-19

## 2018-06-01 RX ORDER — AMOXICILLIN 250 MG
2 CAPSULE ORAL 2 TIMES DAILY
Qty: 120 TABLET | Refills: 11 | Status: SHIPPED | OUTPATIENT
Start: 2018-06-01 | End: 2019-07-19

## 2018-06-01 NOTE — MR AVS SNAPSHOT
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  Normal or non-critical lab and imaging results will be communicated to you by MyChart, letter or phone within 4 business days after the clinic has received the results. If you do not hear from us within 7 days, please contact the clinic through MyChart or phone. If you have a critical or abnormal lab result, we will notify you by phone as soon as possible.  Submit refill requests through [employer] or call your pharmacy and they will forward the refill request to us. Please allow 3 business days for your refill to be completed.          Additional Information About Your Visit        Care EveryWhere ID     This is your Care EveryWhere ID. This could be used by other organizations to access your [facility] medical records  [id number]        Your Vitals Were     Pulse Temperature  Pulse Oximetry BMI (Body Mass Index)          82 98.2  F (36.8  C) (Tympanic) 98% 35.73 kg/m2         Blood Pressure from Last 3 Encounters:   06/01/18 118/62   03/16/18 116/60   03/06/18 106/54    Weight from Last 3 Encounters:   06/01/18 245 lb 8 oz (111.4 kg)   03/16/18 250 lb (113.4 kg)   03/06/18 247 lb (112 kg)              Today, you had the following     No orders found for display         Where to get your medicines      [facility] [place] [place] [facility] [address]  [address] [facility] [address] [facility] [address]     Phone:  [phone]     aspirin 81 MG EC tablet    finasteride 5 MG tablet    metFORMIN modified 1000 MG 24 hr tablet    omeprazole 40 MG capsule    pioglitazone 15 MG tablet    senna-docusate 8.6-50 MG per tablet    sitagliptin 50 MG tablet          Primary Care Provider Office Phone # Fax #    Zack Hudson [phone] [address] [address]        Equal Access to Services     [facility] : Hadii leni ku hadasho Soomaali, waaxda luqadaha, qaybta kaalmada adeegyada, lisa garvin . So [facility] [phone].    ATENCIÓN: Si habla español, tiene a wells disposición servicios gratuitos de asistencia lingüística. [facility] [phone].    We comply with applicable federal civil rights laws and [place] laws. We do not discriminate on the basis of race, color, national origin, age, disability, sex, sexual orientation, or gender identity.            Thank you!     Thank you for choosing [facility] [place] [facility]  for your care. Our goal is always to provide you with excellent care. Hearing back from our patients is one way we can continue to improve our services. Please take a few minutes to complete the written survey that you may receive in the mail after your visit with us. Thank you!             Your Updated Medication List - Protect others around you:  Learn how to safely use, store and throw away your medicines at www.disposemymeds.org.          This list is accurate as of 6/1/18  5:22 PM.  Always use your most recent med list.                   Brand Name Dispense Instructions for use Diagnosis    acetaminophen 500 MG Caps     120 capsule    Take 1 tablet by mouth 4 times daily    Primary osteoarthritis of both knees       albuterol 108 (90 Base) MCG/ACT Inhaler    PROAIR HFA/PROVENTIL HFA/VENTOLIN HFA    1 Inhaler    Inhale 2 puffs into the lungs 4 times daily as needed    Wheezing, Mild intermittent asthma with acute exacerbation       aspirin 81 MG EC tablet     90 tablet    Take 1 tablet (81 mg) by mouth daily    Hyperlipidemia LDL goal <100       atorvastatin 20 MG tablet    LIPITOR    90 tablet    Take 1 tablet (20 mg) by mouth daily    Hyperlipidemia LDL goal <100, Flatulence, eructation, and gas pain       B-12 1000 MCG Tbcr     90 tablet    Take 1,000 mcg by mouth daily    Vitamin B 12 deficiency       blood glucose monitoring lancets     3 Box    Use to test blood sugars  TWICE DAILY    or as directed.    Type 2 diabetes mellitus without complication (H)       * blood glucose monitoring meter device kit    no brand specified    1 kit    Use to test blood sugar two times daily or as directed.    Type 2 diabetes mellitus with diabetic nephropathy (H)       * blood glucose monitoring meter device kit     1 kit    Use to test blood sugars TWICE DAILY PRN  times daily or as directed.    Type 2 diabetes mellitus without complication, without long-term current use of insulin (H)       blood glucose monitoring test strip    no brand specified    100 each    Use to test blood sugars two times daily or as directed    Type 2 diabetes mellitus without complication, without long-term current use of insulin (H)       diclofenac 1 % Gel topical gel    VOLTAREN    100 g    Apply 4 grams to knees or 2 grams to hands four times daily using enclosed dosing card.     Primary osteoarthritis of left knee       finasteride 5 MG tablet    PROSCAR    90 tablet    Take 1 tablet (5 mg) by mouth daily    BPH associated with nocturia       glipiZIDE 10 MG 24 hr tablet    GLUCOTROL XL    30 tablet    Take 1 tablet (10 mg) by mouth daily    Type 2 diabetes mellitus with diabetic nephropathy, without long-term current use of insulin (H)       ipratropium - albuterol 0.5 mg/2.5 mg/3 mL 0.5-2.5 (3) MG/3ML neb solution    DUONEB    2.5 mL    Take 1 vial (3 mLs) by nebulization every 6 hours as needed for shortness of breath / dyspnea or wheezing    Acute bronchitis due to other specified organisms, Wheezing, Mild intermittent asthma with acute exacerbation       lidocaine 5 % ointment    XYLOCAINE    300 g    One application 4 x daily to affected areas lower back and knees if necessary    Midline low back pain with sciatica, sciatica laterality unspecified       losartan 25 MG tablet    COZAAR    90 tablet    Take 1 tablet (25 mg) by mouth daily    Hypertension goal BP (blood pressure) < 140/80       metFORMIN modified 1000 MG 24 hr tablet    GLUMETZA    90 tablet    Take 1 tablet (1,000 mg) by mouth daily (with dinner)    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    Sexual dysfunction       tamsulosin 0.4 MG capsule    FLOMAX    90 capsule    Take 1 capsule (0.4 mg) by mouth daily    Nocturia       triamcinolone 0.1 % cream    KENALOG    80 g    Apply sparingly to affected area three times daily as needed    Neurodermatitis, localized       triamcinolone 0.1 % paste    KENALOG    5 g    Take by mouth 2 times daily    Lesion of lip       valACYclovir 1000 mg tablet    VALTREX    21 tablet    Take 1 tablet (1,000 mg) by mouth 3 times daily    Varicella-zoster virus infection       vitamin D 2000 units tablet     100 tablet    Take 2,000 Units by mouth daily    Vitamin D insufficiency       * Notice:  This list has 2 medication(s) that are the same as other medications prescribed for you. Read the directions carefully, and ask your doctor or other care provider to review them with you.

## 2018-06-01 NOTE — PROGRESS NOTES
SUBJECTIVE:   Suze Devlin is a 73 year old male who presents to clinic today for the following health issues:      ears      Duration: 2 weeks    Description (location/character/radiation): both ears    Intensity:  moderate    Accompanying signs and symptoms: sound and pain    History (similar episodes/previous evaluation): None    Precipitating or alleviating factors: None    Therapies tried and outcome: None       73 year old patient of Dr. Hudson's with morbid obesity, controlled type 2 diabetes, GERD, hyperlipidemia here for ear pain.  Right side congested, painful.  Clogged feeling.      Also, could he get refills on his medications?  DM been well controlled.    Problem list and histories reviewed & adjusted, as indicated.  Additional history: as documented    Recent Labs   Lab Test  03/06/18   1608  11/17/17   1054  09/12/17   1227   10/31/16   1459  03/11/16   1000   03/11/15   1054   A1C  7.3*  8.0*  8.0*   < >  6.7*  9.7*   < >  7.9*   LDL   --    --   74   --   85  135*   --   78   HDL   --    --   44   --   47  55   --   49   TRIG   --    --   158*   --   146  122   --   134   ALT   --    --   30   --   28   --    --   19   CR   --    --   1.09   --   1.04  1.00   < >  1.00   GFRESTIMATED   --    --   66   --   70  73   < >  74   GFRESTBLACK   --    --   80   --   85  89   < >  89   POTASSIUM   --    --   4.1   --   4.4  5.3   < >  4.2   TSH   --    --   3.75   --    --   4.36   --    --     < > = values in this interval not displayed.        Reviewed and updated as needed this visit by clinical staff  Tobacco  Allergies  Meds  Med Hx  Surg Hx  Fam Hx  Soc Hx      Reviewed and updated as needed this visit by Provider         ROS:  Constitutional, HEENT, cardiovascular, pulmonary, gi and gu systems are negative, except as otherwise noted.    OBJECTIVE:     /62 (Cuff Size: Adult Large)  Pulse 82  Temp 98.2  F (36.8  C) (Tympanic)  Wt 245 lb 8 oz (111.4 kg)  SpO2 98%  BMI 35.73  kg/m2  Body mass index is 35.73 kg/(m^2).  GENERAL: healthy, alert and no distress  HENT: normal cephalic/atraumatic, right ear: occluded with wax, left ear: occluded with wax, nose and mouth without ulcers or lesions, oropharynx clear and oral mucous membranes moist  NECK: no adenopathy, no asymmetry, masses, or scars and thyroid normal to palpation  RESP: lungs clear to auscultation - no rales, rhonchi or wheezes  CV: regular rate and rhythm, normal S1 S2, no S3 or S4, no murmur, click or rub, no peripheral edema and peripheral pulses strong  ABDOMEN: soft, nontender, no hepatosplenomegaly, no masses and bowel sounds normal  MS: no gross musculoskeletal defects noted, no edema    Cerumenosis is noted.  Wax is removed by syringing and manual debridement. Instructions for home care to prevent wax buildup are given.      ASSESSMENT/PLAN:     Suze was seen today for ear problem.    Diagnoses and all orders for this visit:    Bilateral impacted cerumen  Comments:  Removed today with syringe and manual debridement.  Instructions for home care to prevent wax buildup are given.      Slow transit constipation  -     senna-docusate (SENOKOT-S;PERICOLACE) 8.6-50 MG per tablet; Take 2 tablets by mouth 2 times daily    Morbid obesity (H)    Flatulence, eructation and gas pain  -     omeprazole (PRILOSEC) 40 MG capsule; Take 1 capsule (40 mg) by mouth daily Take 30-60 minutes before a meal.    Gastroesophageal reflux disease without esophagitis  -     omeprazole (PRILOSEC) 40 MG capsule; Take 1 capsule (40 mg) by mouth daily Take 30-60 minutes before a meal.    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)  -     metFORMIN modified (GLUMETZA) 1000 MG 24 hr tablet; Take 1 tablet (1,000 mg) by mouth daily (with dinner)  -     pioglitazone (ACTOS) 15 MG tablet; Take 1 tablet (15 mg) by mouth daily    Type 2 diabetes mellitus with diabetic nephropathy, without long-term current use of insulin (H)  -     metFORMIN modified (GLUMETZA) 1000 MG 24 hr tablet; Take 1 tablet (1,000 mg) by mouth daily (with dinner)  -     sitagliptin (JANUVIA) 50 MG tablet; Take 1 tablet (50 mg) by mouth daily    Hyperlipidemia LDL goal <100  -     aspirin 81 MG EC tablet; Take 1 tablet (81 mg) by mouth daily    Other orders  -     Cancel: glipiZIDE (GLUCOTROL XL) 10 MG 24 hr tablet; Take 1 tablet (10 mg) by mouth daily        Discussed with patient, all questions answered, in agreement with this plan, will return or seek further care if not improving or worsening.    Apolonia Sherman MD  [facility]

## 2018-06-29 ENCOUNTER — TELEPHONE (OUTPATIENT)
Dept: FAMILY MEDICINE | Facility: CLINIC | Age: 73
End: 2018-06-29

## 2018-06-29 NOTE — TELEPHONE ENCOUNTER
Reason for Call: Request for an order or referral:    Order or referral being requested: re certification     Date needed: as soon as possible    Has the patient been seen by the PCP for this problem? YES    Additional comments: going to see patient 6/30/18 for re certification    Phone number Patient can be reached at:  Other phone number:      Best Time:  asap    Can we leave a detailed message on this number?  YES    Call taken on 6/29/2018 at 12:57 PM by JAYESH MARTIN

## 2018-07-18 ENCOUNTER — TELEPHONE (OUTPATIENT)
Dept: FAMILY MEDICINE | Facility: CLINIC | Age: 73
End: 2018-07-18

## 2018-07-18 NOTE — TELEPHONE ENCOUNTER
Our goal is to have forms completed within 72 hours, however some forms may require a visit or additional information.    What clinic location was the form placed at [facility] or [facility]     Who is the form from?  Cert Period 7/1/18 - 8/29/18  Where did the form come from? Faxed to clinic   The form was placed in the inbox of Zack Hudson Jr MD      Please fax to [phone]    Additional comments:     Call take on 7/18/2018 at 3:07 PM by Grace Sherman

## 2018-07-23 DIAGNOSIS — Z53.9 DIAGNOSIS NOT YET DEFINED: Primary | ICD-10-CM

## 2018-07-23 PROCEDURE — G0179 MD RECERTIFICATION HHA PT: HCPCS | Performed by: FAMILY MEDICINE

## 2018-08-01 ENCOUNTER — PATIENT OUTREACH (OUTPATIENT)
Dept: GERIATRIC MEDICINE | Facility: CLINIC | Age: 73
End: 2018-08-01

## 2018-08-08 ENCOUNTER — PATIENT OUTREACH (OUTPATIENT)
Dept: GERIATRIC MEDICINE | Facility: CLINIC | Age: 73
End: 2018-08-08

## 2018-08-08 NOTE — PROGRESS NOTES
[facility] Care Coordination Contact  Call placed to member to schedule a home visit appointment to complete the annual HRA.  A home visit has been scheduled for 08/13/2018.    SEAMUS Navas  [facility]   RN Care Coordinator   Phone:   [phone]  Fax:       [phone]  \

## 2018-08-13 ENCOUNTER — PATIENT OUTREACH (OUTPATIENT)
Dept: GERIATRIC MEDICINE | Facility: CLINIC | Age: 73
End: 2018-08-13

## 2018-08-13 ASSESSMENT — ACTIVITIES OF DAILY LIVING (ADL): DEPENDENT_IADLS:: CLEANING;COOKING;LAUNDRY;SHOPPING;MEAL PREPARATION;MEDICATION MANAGEMENT;TRANSPORTATION

## 2018-08-13 NOTE — PROGRESS NOTES
[facility] Care Coordination Contact    [facility] Home Visit Assessment     Home visit for Health Risk Assessment with Suze Devlin completed on August 13, 2018    Type of residence:: Private home - no stairs  Current living arrangement:: I live alone     Assessment completed with:: Patient    Current Care Plan  Member currently receiving the following home care services: Skilled Nursing   Member currently receiving the following community resources: Day Care, PCA, Transportation Services      Medication Review  Medication reconciliation completed in Epic: Yes  Medication set-up & administration: RN sets up  weekly.  Self-administers medications.  Medication understanding concerns (by member, family or CC): No  Medication adherence concerns (by member, family or CC): No    Mental/Behavioral Health   Depression Screening: See PHQ assessment flowsheet.          No current MH services-will place referral for N/A    Falls Assessment:   Fallen 2 or more times in the past year?: Yes   Any fall with injury in the past year?: No    ADL/IADL Dependencies:   Dependent ADLs:: Dressing, Bathing, Toileting  Dependent IADLs:: Cleaning, Cooking, Laundry, Shopping, Meal Preparation, Medication Management, Transportation    [facility] Health Plan sponsored benefits: Shared information re: Silver Sneakers/gym memberships, ASA, Calcium +D.    PCA Assessment completed at visit: Yes     Elderly Waiver Eligibility: Yes-will continue on EW    Care Plan & Recommendations: This  CM to continue current POC including PCA, ADC with ADC transportation.    See [facility] for detailed assessment information.    Follow-Up Plan: Member informed of future contact, plan to f/u with member with a 6 month telephone assessment.  Contact information shared with member and family, encouraged member to call with any questions or concerns at any time.    [place] care continuum providers: Please refer to Health Care Home on the [facility] Problem List  to view this patient's [facility] Care Plan Summary.    Digna Chen RN BSN PHN  [facility]   RN Care Coordinator   Phone:   [phone]  Fax:       [phone]

## 2018-08-14 ASSESSMENT — PATIENT HEALTH QUESTIONNAIRE - PHQ9: SUM OF ALL RESPONSES TO PHQ QUESTIONS 1-9: 7

## 2018-08-16 ENCOUNTER — PATIENT OUTREACH (OUTPATIENT)
Dept: GERIATRIC MEDICINE | Facility: CLINIC | Age: 73
End: 2018-08-16

## 2018-08-16 NOTE — LETTER
[address], [address]  [place] [address]  Phone:  [phone]  Fax:  [phone]        August 16, 2018    SUZE VILLALTA  [address] [address]    Dear Suze    Enclosed is a copy of your completed PCA Assessment and Service Plan.  This is for your records and no action is required by you.  If you have additional questions regarding your assessment please contact me at [phone]. If you feel that your needs are not being met, please contact the Clinical Supervisor at [phone].    Sincerely,      TASHI NavasN, RN, PHN  Care Coordinator  [facility]  [phone]      Enclosure:  Completed PCA assessment

## 2018-08-16 NOTE — PROGRESS NOTES
[facility] Care Coordination Contact    [facility]:  Emailed completed PCA assessment to [facility].  Faxed copy of PCA assessment to PCA Agency and mailed copy to member.  Faxed MD Communication to PCP.     Maryam Sherman  Case Management Specialist  [facility]  [phone]

## 2018-08-29 DIAGNOSIS — E78.5 HYPERLIPIDEMIA LDL GOAL <100: Chronic | ICD-10-CM

## 2018-08-29 NOTE — TELEPHONE ENCOUNTER
"aspirin 81 MG EC tablet  Last Written Prescription Date:  06/01/18  Last Fill Quantity: 90,  # refills: 3   Last office visit: 6/1/2018 with prescribing provider:  06/01/18   Future Office Visit:    Requested Prescriptions   Pending Prescriptions Disp Refills     aspirin 81 MG EC tablet 90 tablet 3     Sig: Take 1 tablet (81 mg) by mouth daily    Analgesics (Non-Narcotic Tylenol and ASA Only) Passed    8/29/2018 11:34 AM       Passed - Recent (12 mo) or future (30 days) visit within the authorizing provider's specialty    Patient had office visit in the last 12 months or has a visit in the next 30 days with authorizing provider or within the authorizing provider's specialty.  See \"Patient Info\" tab in inbasket, or \"Choose Columns\" in Meds & Orders section of the refill encounter.           Passed - Patient is age 20 years or older    If ASA is flagged for ages under 20 years old. Forward to provider for confirmation Ryes Syndrome is not a concern.                    "

## 2018-08-29 NOTE — TELEPHONE ENCOUNTER
Reason for Call:  Medication or medication refill:    Do you use a [facility]?  Name of the pharmacy and phone number for the current request:  [facility]    Name of the medication requested: aspirin 81 MG EC tablet    Other request: pharmacy refuses to fax request    Can we leave a detailed message on this number? YES    Phone number patient can be reached at: Other phone number:      Best Time:     Call taken on 8/29/2018 at 11:33 AM by MILY CHOWDHURY

## 2018-09-05 ENCOUNTER — PATIENT OUTREACH (OUTPATIENT)
Dept: GERIATRIC MEDICINE | Facility: CLINIC | Age: 73
End: 2018-09-05

## 2018-09-05 NOTE — LETTER
September 5, 2018    SUZE VILLALTA  [address]   [facility] [address]    Dear Suze,     At [facility], we re dedicated to improving the health and well-being of our members.  Enclosed you will find the Comprehensive Care Plan that was developed with you on August 13th, 2018. Please review the Care Plan carefully.    As a reminder, some of the things we discussed at your visit include:    Ways to improve or maintain your physical health such as walking 20 minutes a day.     Ways to reduce the risk of falls.     Health care needs you may have.     Don t forget to contact your care coordinator if you:    Have been hospitalized or plan to be hospitalized.     Have experienced a fall.      Have experienced a change in physical health, which may include bladder control and pain issues.      Are experiencing emotional problems.     If you do not agree with your Care Plan, have questions about it, or have experienced a change in your needs, please contact me, your care coordinator, at [phone]. If you are hearing impaired, please call the [place] Relay at 709 or [phone] (vsvgvp-lm-fuzjlc relay service).    Sincerely,      TASHI NavasN, RN, N  [facility]     [facility] is a health plan that contracts with both Medicare and the [place] Medical Assistance (Medicaid) program to provide benefits of both programs to enrollees. Enrollment in [facility] depends on contract renewal.    MSC+ [id number] [place] ([id number])                                                  (12/16)

## 2018-09-05 NOTE — PROGRESS NOTES
[facility] Care Coordination Contact  Received after visit chart from care coordinator.  Completed following tasks: Mailed copy of care plan to client, Submitted referrals/auths for ADC & Hmkg and Entered MMIS.     Chart was returned to JULIANNA Sherman  Case Management Specialist  [facility]  [phone]

## 2018-09-12 ENCOUNTER — OFFICE VISIT (OUTPATIENT)
Dept: FAMILY MEDICINE | Facility: CLINIC | Age: 73
End: 2018-09-12
Payer: COMMERCIAL

## 2018-09-12 VITALS
BODY MASS INDEX: 36.39 KG/M2 | RESPIRATION RATE: 16 BRPM | TEMPERATURE: 97.7 F | DIASTOLIC BLOOD PRESSURE: 58 MMHG | HEART RATE: 74 BPM | SYSTOLIC BLOOD PRESSURE: 120 MMHG | OXYGEN SATURATION: 100 % | WEIGHT: 250 LBS

## 2018-09-12 DIAGNOSIS — K59.00 CONSTIPATION, UNSPECIFIED CONSTIPATION TYPE: Primary | ICD-10-CM

## 2018-09-12 DIAGNOSIS — I10 HYPERTENSION GOAL BP (BLOOD PRESSURE) < 140/80: ICD-10-CM

## 2018-09-12 DIAGNOSIS — E11.40 TYPE 2 DIABETES MELLITUS WITH DIABETIC NEUROPATHY, WITHOUT LONG-TERM CURRENT USE OF INSULIN (H): ICD-10-CM

## 2018-09-12 DIAGNOSIS — J45.31 MILD PERSISTENT ASTHMA WITH ACUTE EXACERBATION: ICD-10-CM

## 2018-09-12 DIAGNOSIS — E78.5 HYPERLIPIDEMIA LDL GOAL <100: ICD-10-CM

## 2018-09-12 LAB
BASOPHILS # BLD AUTO: 0 10E9/L (ref 0–0.2)
BASOPHILS NFR BLD AUTO: 0.4 %
DIFFERENTIAL METHOD BLD: NORMAL
EOSINOPHIL # BLD AUTO: 0.3 10E9/L (ref 0–0.7)
EOSINOPHIL NFR BLD AUTO: 6.2 %
ERYTHROCYTE [DISTWIDTH] IN BLOOD BY AUTOMATED COUNT: 13.4 % (ref 10–15)
HBA1C MFR BLD: 7.8 % (ref 0–5.6)
HCT VFR BLD AUTO: 41.3 % (ref 40–53)
HGB BLD-MCNC: 13.9 G/DL (ref 13.3–17.7)
LYMPHOCYTES # BLD AUTO: 1.6 10E9/L (ref 0.8–5.3)
LYMPHOCYTES NFR BLD AUTO: 36.2 %
MCH RBC QN AUTO: 29.4 PG (ref 26.5–33)
MCHC RBC AUTO-ENTMCNC: 33.7 G/DL (ref 31.5–36.5)
MCV RBC AUTO: 87 FL (ref 78–100)
MONOCYTES # BLD AUTO: 0.7 10E9/L (ref 0–1.3)
MONOCYTES NFR BLD AUTO: 15.1 %
NEUTROPHILS # BLD AUTO: 1.9 10E9/L (ref 1.6–8.3)
NEUTROPHILS NFR BLD AUTO: 42.1 %
PLATELET # BLD AUTO: 174 10E9/L (ref 150–450)
RBC # BLD AUTO: 4.73 10E12/L (ref 4.4–5.9)
WBC # BLD AUTO: 4.5 10E9/L (ref 4–11)

## 2018-09-12 PROCEDURE — 99214 OFFICE O/P EST MOD 30 MIN: CPT | Performed by: FAMILY MEDICINE

## 2018-09-12 PROCEDURE — 85025 COMPLETE CBC W/AUTO DIFF WBC: CPT | Performed by: FAMILY MEDICINE

## 2018-09-12 PROCEDURE — 36415 COLL VENOUS BLD VENIPUNCTURE: CPT | Performed by: FAMILY MEDICINE

## 2018-09-12 PROCEDURE — 99207 C FOOT EXAM  NO CHARGE: CPT | Mod: 25 | Performed by: FAMILY MEDICINE

## 2018-09-12 PROCEDURE — 80053 COMPREHEN METABOLIC PANEL: CPT | Performed by: FAMILY MEDICINE

## 2018-09-12 PROCEDURE — 82043 UR ALBUMIN QUANTITATIVE: CPT | Performed by: FAMILY MEDICINE

## 2018-09-12 PROCEDURE — 83036 HEMOGLOBIN GLYCOSYLATED A1C: CPT | Performed by: FAMILY MEDICINE

## 2018-09-12 RX ORDER — ALBUTEROL SULFATE 90 UG/1
2 AEROSOL, METERED RESPIRATORY (INHALATION) 4 TIMES DAILY PRN
Qty: 1 INHALER | Refills: 2 | Status: SHIPPED | OUTPATIENT
Start: 2018-09-12 | End: 2020-02-10

## 2018-09-12 RX ORDER — POLYETHYLENE GLYCOL 3350 17 G/17G
1 POWDER, FOR SOLUTION ORAL DAILY PRN
Qty: 119 G | Refills: 0 | Status: SHIPPED | OUTPATIENT
Start: 2018-09-12 | End: 2019-07-22

## 2018-09-12 ASSESSMENT — ANXIETY QUESTIONNAIRES
2. NOT BEING ABLE TO STOP OR CONTROL WORRYING: NOT AT ALL
7. FEELING AFRAID AS IF SOMETHING AWFUL MIGHT HAPPEN: NOT AT ALL
6. BECOMING EASILY ANNOYED OR IRRITABLE: NOT AT ALL
4. TROUBLE RELAXING: NOT AT ALL
GAD7 TOTAL SCORE: 0
3. WORRYING TOO MUCH ABOUT DIFFERENT THINGS: NOT AT ALL
5. BEING SO RESTLESS THAT IT IS HARD TO SIT STILL: NOT AT ALL
7. FEELING AFRAID AS IF SOMETHING AWFUL MIGHT HAPPEN: NOT AT ALL
GAD7 TOTAL SCORE: 0
GAD7 TOTAL SCORE: 0
1. FEELING NERVOUS, ANXIOUS, OR ON EDGE: NOT AT ALL

## 2018-09-12 ASSESSMENT — ASTHMA QUESTIONNAIRES
QUESTION_5 LAST FOUR WEEKS HOW WOULD YOU RATE YOUR ASTHMA CONTROL: WELL CONTROLLED
ACT_TOTALSCORE: 13
QUESTION_2 LAST FOUR WEEKS HOW OFTEN HAVE YOU HAD SHORTNESS OF BREATH: THREE TO SIX TIMES A WEEK
QUESTION_1 LAST FOUR WEEKS HOW MUCH OF THE TIME DID YOUR ASTHMA KEEP YOU FROM GETTING AS MUCH DONE AT WORK, SCHOOL OR AT HOME: SOME OF THE TIME
QUESTION_3 LAST FOUR WEEKS HOW OFTEN DID YOUR ASTHMA SYMPTOMS (WHEEZING, COUGHING, SHORTNESS OF BREATH, CHEST TIGHTNESS OR PAIN) WAKE YOU UP AT NIGHT OR EARLIER THAN USUAL IN THE MORNING: FOUR OR MORE NIGHTS A WEEK
QUESTION_4 LAST FOUR WEEKS HOW OFTEN HAVE YOU USED YOUR RESCUE INHALER OR NEBULIZER MEDICATION (SUCH AS ALBUTEROL): ONE OR TWO TIMES PER DAY

## 2018-09-12 ASSESSMENT — PATIENT HEALTH QUESTIONNAIRE - PHQ9
SUM OF ALL RESPONSES TO PHQ QUESTIONS 1-9: 0
SUM OF ALL RESPONSES TO PHQ QUESTIONS 1-9: 0

## 2018-09-12 NOTE — PROGRESS NOTES
SUBJECTIVE:   Suze Devlin is a 73 year old male who presents to clinic today for the following health issues:    Chief Complaint   Patient presents with     Constipation     Asthma     needs refills       History is through  Services.    Constipation      Duration: About a week    Description:       Frequency of bowel movements: Stools every 2 days currently, but typically has stools every day.       Consistency of stool: Hard    Intensity:  Moderate    Accompanying signs and symptoms:        Abdominal pain: YES - Not much, except with constipation.  Better today.  Gets relief after a bowel movement, with last bowel movement last night.       Rectal pain: no        Blood in stool: no        Nausea/vomiting: no     History:        Similar problems in past: YES - Has done well with MiraLAX in the past, but patient denies using this in the recent past    Precipitating or alleviating factors:        Medications worsening symptoms: no     Therapies tried and outcome:  Senna-Docusate, as prescribed by primary MD.  This is not helping as much recently, per patient.       Chronic laxative use: no     Asthma Follow-Up    Was ACT completed today?    Yes    ACT Total Scores 9/12/2018   ACT TOTAL SCORE -   ASTHMA ER VISITS -   ASTHMA HOSPITALIZATIONS -   ACT TOTAL SCORE (Goal Greater than or Equal to 20) 13   In the past 12 months, how many times did you visit the emergency room for your asthma without being admitted to the hospital? 0   In the past 12 months, how many times were you hospitalized overnight because of your asthma? 0       Recent asthma triggers that patient is dealing with: pollens, strong odors and fumes and constipation, and mowed grass     On Advair and Singulair, using Albuterol as needed.    Rare Albuterol use if not constipated.    If constipated, patient notes cough and wheezing.    Patient ran out of Advair 2 days ago.    Diabetes, Hypertension, and Hyperlipidemia Follow-Up -  Follow-up labs were ordered, as per Health Maintenance alerts.    Blood glucose 130-150 mg/dL, as checked at home.  Last A1c was 7.3 in 03/2018, 6 months ago.  No pain in feet.  Due for vision exam.  Due for lipid panel, but not fasting today.  Edema only with prolonged standing, not noted currently.  No chest pain, shortness of breath, orthopnea, or exercise-related symptoms.    Problem list and histories reviewed & adjusted, as indicated.  Additional history: as documented    Needs form for [facility].    No fever, cough, hemoptysis, weight loss, or night sweats.  No TB exposure.  No vision, hearing, or speech concerns reported.  Due for eye exam, as last done 06/2017.  Able to walk and do exercise machines without chest pain or shortness of breath.    Patient Active Problem List   Diagnosis     Health Care Home     Cataract     Elevated prostate specific antigen (PSA)     Psychophysiological insomnia     Vitamin D deficiency     Hypertrophy of prostate without urinary obstruction     Memory loss     Anxiety state     Gastroesophageal reflux disease without esophagitis     Primary localized osteoarthrosis, lower leg     Vitamin D insufficiency     Chronic idiopathic constipation     Vitamin B 12 deficiency     Hypertension goal BP (blood pressure) < 140/80     Chronic fatigue     Hyperlipidemia LDL goal <100     GERD (gastroesophageal reflux disease)     Mild persistent asthma without complication     Diabetic neuropathy (H)     Lumbago     ACP (advance care planning)     Type 2 diabetes mellitus without complication, without long-term current use of insulin (H)     Mild intermittent asthma without complication     Screening examination for pulmonary tuberculosis     Morbid obesity (H)     Past Surgical History:   Procedure Laterality Date     HERNIA REPAIR, INGUINAL RT/LT         Social History   Substance Use Topics     Smoking status: Never Smoker     Smokeless tobacco: Never Used     Alcohol use No      Family History   Problem Relation Age of Onset     Unknown/Adopted Mother      medical history of family is unknown.         Current Outpatient Prescriptions   Medication Sig Dispense Refill     acetaminophen 500 MG CAPS Take 1 tablet by mouth 4 times daily 120 capsule 11     albuterol (PROAIR HFA/PROVENTIL HFA/VENTOLIN HFA) 108 (90 Base) MCG/ACT inhaler Inhale 2 puffs into the lungs 4 times daily as needed 1 Inhaler 2     aspirin 81 MG EC tablet Take 1 tablet (81 mg) by mouth daily 90 tablet 3     atorvastatin (LIPITOR) 20 MG tablet Take 1 tablet (20 mg) by mouth daily 90 tablet 3     blood glucose monitoring (NO BRAND SPECIFIED) meter device kit Use to test blood sugar two times daily or as directed. 1 kit 0     blood glucose monitoring (NO BRAND SPECIFIED) test strip Use to test blood sugars two times daily or as directed 100 each 0     blood glucose monitoring (ONE TOUCH DELICA) lancets Use to test blood sugars  TWICE DAILY    or as directed. 3 Box prn     blood glucose monitoring (ONE TOUCH ULTRA 2) meter device kit Use to test blood sugars TWICE DAILY PRN  times daily or as directed. 1 kit 0     Cholecalciferol (VITAMIN D) 2000 UNITS tablet Take 2,000 Units by mouth daily 100 tablet 3     Cyanocobalamin (B-12) 1000 MCG TBCR Take 1,000 mcg by mouth daily 90 tablet 2     diclofenac (VOLTAREN) 1 % GEL Apply 4 grams to knees or 2 grams to hands four times daily using enclosed dosing card. 100 g 1     finasteride (PROSCAR) 5 MG tablet Take 1 tablet (5 mg) by mouth daily 90 tablet 3     fluticasone-salmeterol (ADVAIR) 100-50 MCG/DOSE diskus inhaler Inhale 1 puff into the lungs 2 times daily 1 Inhaler 5     glipiZIDE (GLUCOTROL XL) 10 MG 24 hr tablet Take 1 tablet (10 mg) by mouth daily 30 tablet 11     ipratropium - albuterol 0.5 mg/2.5 mg/3 mL (DUONEB) 0.5-2.5 (3) MG/3ML nebulization Take 1 vial (3 mLs) by nebulization every 6 hours as needed for shortness of breath / dyspnea or wheezing 2.5 mL o     lidocaine  (XYLOCAINE) 5 % ointment One application 4 x daily to affected areas lower back and knees if necessary 300 g 0     losartan (COZAAR) 25 MG tablet Take 1 tablet (25 mg) by mouth daily 90 tablet 3     metFORMIN modified (GLUMETZA) 1000 MG 24 hr tablet Take 1 tablet (1,000 mg) by mouth daily (with dinner) 90 tablet 3     montelukast (SINGULAIR) 10 MG tablet Take 1 tablet (10 mg) by mouth At Bedtime 30 tablet 11     omeprazole (PRILOSEC) 40 MG capsule Take 1 capsule (40 mg) by mouth daily Take 30-60 minutes before a meal. 91 capsule 3     order for DME Glucometer, brand as covered by insurance.-(ONE TOUCH DELICA) 1 each 0     pioglitazone (ACTOS) 15 MG tablet Take 1 tablet (15 mg) by mouth daily 90 tablet 1            senna-docusate (SENOKOT-S;PERICOLACE) 8.6-50 MG per tablet Take 1-2 tablets by mouth 2 times daily as needed 120 tablet 11     sitagliptin (JANUVIA) 50 MG tablet Take 1 tablet (50 mg) by mouth daily 90 tablet 3     tadalafil (CIALIS) 5 MG tablet Take 1 tablet (5 mg) by mouth daily Never use with nitroglycerin, terazosin or doxazosin. 90 tablet 0     tamsulosin (FLOMAX) 0.4 MG capsule Take 1 capsule (0.4 mg) by mouth daily 90 capsule 3     triamcinolone (KENALOG) 0.1 % cream Apply sparingly to affected area three times daily as needed 80 g 11            No Known Allergies    Reviewed and updated as needed this visit by clinical staff  Tobacco  Allergies  Meds  Problems  Med Hx  Surg Hx  Fam Hx  Soc Hx        Reviewed and updated as needed this visit by Provider  Allergies  Meds  Problems         ROS:  See above.    OBJECTIVE:     /58  Pulse 74  Temp 97.7  F (36.5  C)  Resp 16  Wt 250 lb (113.4 kg)  SpO2 100%  BMI 36.39 kg/m2  Body mass index is 36.39 kg/(m^2).    GENERAL APPEARANCE:  Awake, alert, and in no acute distress.  History is through  Services.  PSYCHIATRIC:  Pleasant affect.  HEENT:  Sclera anicteric.  No conjunctivitis.  PERRLA.  Extraocular movements are intact.   Right TM and canal are within normal limits.  Some cerumen noted involving the left outer ear canal, but visualized portion of the left TM is within normal limits.  No obvious nasal congestion.  No erythema, edema, or exudates of the oral mucosa or posterior pharynx.  Mucous membranes moist.  NECK:  Spontaneous full range of motion.  No thyromegaly or mass.  No lymphadenopathy.  HEART:  Normal S1, S2.  Regular rate and rhythm.  No murmurs, rubs, or gallops.  LUNGS:  No respiratory distress.  There are some crackles noted at the right lung base, with diffuse wheezing noted throughout the lung fields.  Reasonable air entry throughout the lung fields.  No rhonchi.  ABDOMEN: Soft.  Mildly distended.  Mild tenderness involving the mid left lateral abdomen.  Abdomen is otherwise not tender to palpation.  No mass, rebound tenderness, or guarding.  Positive bowel sounds.  EXTREMITIES:  Moves 4 extremities.   Trace edema, but no calf erythema or tenderness.  NEUROLOGIC:  Gait within normal limits.  No facial droop or acute neurologic deficits.  SKIN:  No rash.  Diabetic foot exam: Normal dorsalis pedis and posterior tibial pulses.  There is thickening noted involving the patient's left second toenail.  No other trophic changes.  No ulcerations.  Monofilament testing confirms decreased sensation at half of the tested points.    Chest X-ray and Abdominal Flat and Upright:  Discussed with and declined by patient at time of exam.    LABORATORY:  Office Visit on 09/12/2018   Component Date Value Ref Range Status     Hemoglobin A1C 09/12/2018 7.8* 0 - 5.6 % Final    Comment: Normal <5.7% Prediabetes 5.7-6.4%  Diabetes 6.5% or higher - adopted from ADA   consensus guidelines.       WBC 09/12/2018 4.5  4.0 - 11.0 10e9/L Final     RBC Count 09/12/2018 4.73  4.4 - 5.9 10e12/L Final     Hemoglobin 09/12/2018 13.9  13.3 - 17.7 g/dL Final     Hematocrit 09/12/2018 41.3  40.0 - 53.0 % Final     MCV 09/12/2018 87  78 - 100 fl Final     MCH  09/12/2018 29.4  26.5 - 33.0 pg Final     MCHC 09/12/2018 33.7  31.5 - 36.5 g/dL Final     RDW 09/12/2018 13.4  10.0 - 15.0 % Final     Platelet Count 09/12/2018 174  150 - 450 10e9/L Final     Diff Method 09/12/2018 Automated Method   Final     % Neutrophils 09/12/2018 42.1  % Final     % Lymphocytes 09/12/2018 36.2  % Final     % Monocytes 09/12/2018 15.1  % Final     % Eosinophils 09/12/2018 6.2  % Final     % Basophils 09/12/2018 0.4  % Final     Absolute Neutrophil 09/12/2018 1.9  1.6 - 8.3 10e9/L Final     Absolute Lymphocytes 09/12/2018 1.6  0.8 - 5.3 10e9/L Final     Absolute Monocytes 09/12/2018 0.7  0.0 - 1.3 10e9/L Final     Absolute Eosinophils 09/12/2018 0.3  0.0 - 0.7 10e9/L Final     Absolute Basophils 09/12/2018 0.0  0.0 - 0.2 10e9/L Final        ASSESSMENT/PLAN:       ICD-10-CM    1. Constipation, unspecified constipation type x 1 week.  Doubt obstruction.  CBC is within normal limits today. K59.00 polyethylene glycol (MIRALAX) powder     CBC with platelets differential   2. Mild persistent asthma with acute exacerbation, with current ACT score 13.  Patient has been out of his usual Advair the past 2 days. J45.31 albuterol (PROAIR HFA/PROVENTIL HFA/VENTOLIN HFA) 108 (90 Base) MCG/ACT inhaler     fluticasone-salmeterol (ADVAIR) 100-50 MCG/DOSE diskus inhaler   3. Type 2 diabetes mellitus with diabetic neuropathy, without long-term current use of insulin (H), with current A1c 7.8.  Current A1c may be reasonable, given the patient's age.  Patient in on an ARB, Aspirin, and statin. E11.40 FOOT EXAM  NO CHARGE [36375.114]     HEMOGLOBIN A1C     Albumin Random Urine Quantitative with Creat Ratio     Comprehensive metabolic panel     OPHTHALMOLOGY ADULT REFERRAL     PAF COMPLETED   4. Hypertension goal BP (blood pressure) < 140/80, well-controlled on current regimen. I10 Comprehensive metabolic panel   5. Hyperlipidemia LDL goal <100, on Lipitor.  Patient is due for a follow up lipid panel, but he is not  fasting today. E78.5        -Patient declines further workup or evaluation today, preferring to restart his Advair and follow up with his PCP, as discussed.    -Patient declines colon cancer screening (e.g. FIT test), risk/benefits discussed at time of exam.    Patient Instructions     Restart Advair, as discussed.    Use Albuterol every 4-6 hours, as needed.    Follow-up with Dr. Hudson in 2 days to recheck.    Follow up sooner if:  fever, worsening symptoms, or frequent Albuterol use.    Follow up immediately if:  shortness of breath (not responsive to Albuterol), hemoptysis (coughing up blood), or other severe/emergent symptoms.      MiraLAX, as prescribed.    Follow up if worsening condition or not improving over the next week.    Follow up in the ER immediately if:  fever, uncontrolled vomiting, severe/worsening abdominal pain, severe/worsening back pain, or other emergent symptoms, as discussed at time of visit.      Follow-up with Dr. Hudson to discuss diabetes labs and obtain a fasting cholesterol, as discussed at time of visit.    Schedule eye exam, as discussed.    -[facility] the [facility], [facility].  Patient will require final clearance from his PCP, based on his current respiratory symptoms.    -PAF form was completed, as noted in Epic.    Mara Durán MD  [facility]

## 2018-09-12 NOTE — LETTER
September 13, 2018      Suze Devlin  [address] [address]        Dear ,    We are writing to inform you of your test results.    Please make an appointment with your provider to review or follow up on your test results.  Appointments can be made by calling [phone].    Resulted Orders   HEMOGLOBIN A1C   Result Value Ref Range    Hemoglobin A1C 7.8 (H) 0 - 5.6 %      Comment:      Normal <5.7% Prediabetes 5.7-6.4%  Diabetes 6.5% or higher - adopted from ADA   consensus guidelines.     Comprehensive metabolic panel   Result Value Ref Range    Sodium 139 133 - 144 mmol/L    Potassium 3.8 3.4 - 5.3 mmol/L    Chloride 105 94 - 109 mmol/L    Carbon Dioxide 26 20 - 32 mmol/L    Anion Gap 8 3 - 14 mmol/L    Glucose 216 (H) 70 - 99 mg/dL    Urea Nitrogen 15 7 - 30 mg/dL    Creatinine 0.95 0.66 - 1.25 mg/dL    GFR Estimate 77 >60 mL/min/1.7m2      Comment:      Non  GFR Calc    GFR Estimate If Black >90 >60 mL/min/1.7m2      Comment:       GFR Calc    Calcium 9.4 8.5 - 10.1 mg/dL    Bilirubin Total 0.3 0.2 - 1.3 mg/dL    Albumin 3.3 (L) 3.4 - 5.0 g/dL    Protein Total 7.3 6.8 - 8.8 g/dL    Alkaline Phosphatase 89 40 - 150 U/L    ALT 28 0 - 70 U/L    AST 13 0 - 45 U/L   CBC with platelets differential   Result Value Ref Range    WBC 4.5 4.0 - 11.0 10e9/L    RBC Count 4.73 4.4 - 5.9 10e12/L    Hemoglobin 13.9 13.3 - 17.7 g/dL    Hematocrit 41.3 40.0 - 53.0 %    MCV 87 78 - 100 fl    MCH 29.4 26.5 - 33.0 pg    MCHC 33.7 31.5 - 36.5 g/dL    RDW 13.4 10.0 - 15.0 %    Platelet Count 174 150 - 450 10e9/L    Diff Method Automated Method     % Neutrophils 42.1 %    % Lymphocytes 36.2 %    % Monocytes 15.1 %    % Eosinophils 6.2 %    % Basophils 0.4 %    Absolute Neutrophil 1.9 1.6 - 8.3 10e9/L    Absolute Lymphocytes 1.6 0.8 - 5.3 10e9/L    Absolute Monocytes 0.7 0.0 - 1.3 10e9/L    Absolute Eosinophils 0.3 0.0 - 0.7 10e9/L    Absolute Basophils 0.0 0.0  - 0.2 10e9/L       If you have any questions or concerns, please call the clinic at the number listed above.       Sincerely,        Mara Durán MD

## 2018-09-12 NOTE — PATIENT INSTRUCTIONS
Restart Advair, as discussed.    Use Albuterol every 4-6 hours, as needed.    Follow-up with Dr. Hudson in 2 days to recheck.    Follow up sooner if:  fever, worsening symptoms, or frequent Albuterol use.    Follow up immediately if:  shortness of breath (not responsive to Albuterol), hemoptysis (coughing up blood), or other severe/emergent symptoms.      MiraLAX, as prescribed.    Follow up if worsening condition or not improving over the next week.    Follow up in the ER immediately if:  fever, uncontrolled vomiting, severe/worsening abdominal pain, severe/worsening back pain, or other emergent symptoms, as discussed at time of visit.      Follow-up with Dr. Hudson to discuss diabetes labs and obtain a fasting cholesterol, as discussed at time of visit.    Schedule eye exam, as discussed.

## 2018-09-13 LAB
ALBUMIN SERPL-MCNC: 3.3 G/DL (ref 3.4–5)
ALP SERPL-CCNC: 89 U/L (ref 40–150)
ALT SERPL W P-5'-P-CCNC: 28 U/L (ref 0–70)
ANION GAP SERPL CALCULATED.3IONS-SCNC: 8 MMOL/L (ref 3–14)
AST SERPL W P-5'-P-CCNC: 13 U/L (ref 0–45)
BILIRUB SERPL-MCNC: 0.3 MG/DL (ref 0.2–1.3)
BUN SERPL-MCNC: 15 MG/DL (ref 7–30)
CALCIUM SERPL-MCNC: 9.4 MG/DL (ref 8.5–10.1)
CHLORIDE SERPL-SCNC: 105 MMOL/L (ref 94–109)
CO2 SERPL-SCNC: 26 MMOL/L (ref 20–32)
CREAT SERPL-MCNC: 0.95 MG/DL (ref 0.66–1.25)
CREAT UR-MCNC: 168 MG/DL
GFR SERPL CREATININE-BSD FRML MDRD: 77 ML/MIN/1.7M2
GLUCOSE SERPL-MCNC: 216 MG/DL (ref 70–99)
MICROALBUMIN UR-MCNC: 50 MG/L
MICROALBUMIN/CREAT UR: 30 MG/G CR (ref 0–17)
POTASSIUM SERPL-SCNC: 3.8 MMOL/L (ref 3.4–5.3)
PROT SERPL-MCNC: 7.3 G/DL (ref 6.8–8.8)
SODIUM SERPL-SCNC: 139 MMOL/L (ref 133–144)

## 2018-09-13 ASSESSMENT — ANXIETY QUESTIONNAIRES: GAD7 TOTAL SCORE: 0

## 2018-09-13 ASSESSMENT — ASTHMA QUESTIONNAIRES: ACT_TOTALSCORE: 13

## 2018-09-13 ASSESSMENT — PATIENT HEALTH QUESTIONNAIRE - PHQ9: SUM OF ALL RESPONSES TO PHQ QUESTIONS 1-9: 0

## 2018-09-28 ENCOUNTER — TELEPHONE (OUTPATIENT)
Dept: FAMILY MEDICINE | Facility: CLINIC | Age: 73
End: 2018-09-28

## 2018-09-28 DIAGNOSIS — Z53.9 DIAGNOSIS NOT YET DEFINED: Primary | ICD-10-CM

## 2018-09-28 PROCEDURE — G0179 MD RECERTIFICATION HHA PT: HCPCS | Performed by: FAMILY MEDICINE

## 2018-09-28 NOTE — TELEPHONE ENCOUNTER
Our goal is to have forms completed within 72 hours, however some forms may require a visit or additional information.    What clinic location was the form placed at [facility] or [facility]     Who is the form from? Home Care  Where did the form come from? Faxed to clinic   The form was placed in the inbox of Zack Hudson Jr MD      Please fax to [phone]    Additional comments: [facility] cert 8/30/18-10/28/18    Call take on 9/28/2018 at 10:47 AM by May Adames

## 2018-10-02 ENCOUNTER — PATIENT OUTREACH (OUTPATIENT)
Dept: GERIATRIC MEDICINE | Facility: CLINIC | Age: 73
End: 2018-10-02

## 2018-10-02 NOTE — PROGRESS NOTES
[facility] Care Coordination Contact  Per CC member has not received bus card ordered last month.  This CMS completed a [facility] transportation requst form and sent to [facility].   Gina Wan  Case Management Specialist  [facility]  [phone]

## 2018-10-25 ENCOUNTER — OFFICE VISIT (OUTPATIENT)
Dept: FAMILY MEDICINE | Facility: CLINIC | Age: 73
End: 2018-10-25
Payer: COMMERCIAL

## 2018-10-25 VITALS
RESPIRATION RATE: 14 BRPM | HEIGHT: 70 IN | WEIGHT: 248 LBS | HEART RATE: 74 BPM | OXYGEN SATURATION: 98 % | SYSTOLIC BLOOD PRESSURE: 130 MMHG | BODY MASS INDEX: 35.5 KG/M2 | TEMPERATURE: 98.3 F | DIASTOLIC BLOOD PRESSURE: 60 MMHG

## 2018-10-25 DIAGNOSIS — E11.40 TYPE 2 DIABETES MELLITUS WITH DIABETIC NEUROPATHY, WITHOUT LONG-TERM CURRENT USE OF INSULIN (H): ICD-10-CM

## 2018-10-25 DIAGNOSIS — E66.812 CLASS 2 SEVERE OBESITY DUE TO EXCESS CALORIES WITH SERIOUS COMORBIDITY AND BODY MASS INDEX (BMI) OF 36.0 TO 36.9 IN ADULT (H): ICD-10-CM

## 2018-10-25 DIAGNOSIS — E11.65 TYPE II OR UNSPECIFIED TYPE DIABETES MELLITUS WITH NEUROLOGICAL MANIFESTATIONS, UNCONTROLLED(250.62) (H): Primary | ICD-10-CM

## 2018-10-25 DIAGNOSIS — E66.01 CLASS 2 SEVERE OBESITY DUE TO EXCESS CALORIES WITH SERIOUS COMORBIDITY AND BODY MASS INDEX (BMI) OF 36.0 TO 36.9 IN ADULT (H): ICD-10-CM

## 2018-10-25 DIAGNOSIS — J45.51 SEVERE PERSISTENT ASTHMA WITH EXACERBATION (H): ICD-10-CM

## 2018-10-25 DIAGNOSIS — I10 BENIGN ESSENTIAL HYPERTENSION: ICD-10-CM

## 2018-10-25 DIAGNOSIS — E11.49 TYPE II OR UNSPECIFIED TYPE DIABETES MELLITUS WITH NEUROLOGICAL MANIFESTATIONS, UNCONTROLLED(250.62) (H): Primary | ICD-10-CM

## 2018-10-25 DIAGNOSIS — Z23 NEED FOR PROPHYLACTIC VACCINATION AND INOCULATION AGAINST INFLUENZA: ICD-10-CM

## 2018-10-25 DIAGNOSIS — Z23 NEED FOR VACCINATION: ICD-10-CM

## 2018-10-25 DIAGNOSIS — Z23 NEED FOR PROPHYLACTIC VACCINATION AGAINST STREPTOCOCCUS PNEUMONIAE (PNEUMOCOCCUS): ICD-10-CM

## 2018-10-25 DIAGNOSIS — E78.2 MIXED HYPERLIPIDEMIA: ICD-10-CM

## 2018-10-25 DIAGNOSIS — Z12.5 SCREENING FOR PROSTATE CANCER: ICD-10-CM

## 2018-10-25 DIAGNOSIS — J45.30 MILD PERSISTENT ASTHMA WITHOUT COMPLICATION: Chronic | ICD-10-CM

## 2018-10-25 PROCEDURE — 84460 ALANINE AMINO (ALT) (SGPT): CPT | Performed by: FAMILY MEDICINE

## 2018-10-25 PROCEDURE — G0008 ADMIN INFLUENZA VIRUS VAC: HCPCS | Performed by: FAMILY MEDICINE

## 2018-10-25 PROCEDURE — 36415 COLL VENOUS BLD VENIPUNCTURE: CPT | Performed by: FAMILY MEDICINE

## 2018-10-25 PROCEDURE — 90662 IIV NO PRSV INCREASED AG IM: CPT | Performed by: FAMILY MEDICINE

## 2018-10-25 PROCEDURE — 80061 LIPID PANEL: CPT | Performed by: FAMILY MEDICINE

## 2018-10-25 PROCEDURE — G0103 PSA SCREENING: HCPCS | Performed by: FAMILY MEDICINE

## 2018-10-25 PROCEDURE — 90670 PCV13 VACCINE IM: CPT | Performed by: FAMILY MEDICINE

## 2018-10-25 PROCEDURE — G0009 ADMIN PNEUMOCOCCAL VACCINE: HCPCS | Performed by: FAMILY MEDICINE

## 2018-10-25 PROCEDURE — 99215 OFFICE O/P EST HI 40 MIN: CPT | Mod: 25 | Performed by: FAMILY MEDICINE

## 2018-10-25 RX ORDER — IPRATROPIUM BROMIDE AND ALBUTEROL SULFATE 2.5; .5 MG/3ML; MG/3ML
1 SOLUTION RESPIRATORY (INHALATION) EVERY 6 HOURS PRN
Qty: 90 VIAL | Refills: 0 | Status: SHIPPED | OUTPATIENT
Start: 2018-10-25 | End: 2019-07-22

## 2018-10-25 ASSESSMENT — PATIENT HEALTH QUESTIONNAIRE - PHQ9: 5. POOR APPETITE OR OVEREATING: NEARLY EVERY DAY

## 2018-10-25 ASSESSMENT — ANXIETY QUESTIONNAIRES
2. NOT BEING ABLE TO STOP OR CONTROL WORRYING: MORE THAN HALF THE DAYS
1. FEELING NERVOUS, ANXIOUS, OR ON EDGE: NEARLY EVERY DAY
5. BEING SO RESTLESS THAT IT IS HARD TO SIT STILL: NOT AT ALL
6. BECOMING EASILY ANNOYED OR IRRITABLE: MORE THAN HALF THE DAYS
7. FEELING AFRAID AS IF SOMETHING AWFUL MIGHT HAPPEN: NOT AT ALL
GAD7 TOTAL SCORE: 10
3. WORRYING TOO MUCH ABOUT DIFFERENT THINGS: NOT AT ALL
IF YOU CHECKED OFF ANY PROBLEMS ON THIS QUESTIONNAIRE, HOW DIFFICULT HAVE THESE PROBLEMS MADE IT FOR YOU TO DO YOUR WORK, TAKE CARE OF THINGS AT HOME, OR GET ALONG WITH OTHER PEOPLE: NOT DIFFICULT AT ALL

## 2018-10-25 NOTE — PROGRESS NOTES
SUBJECTIVE:   Suze Devlin is a 73 year old male who presents to clinic today for the following health issues:    Diabetes -UNcontrolled on 4 po meds     Patient is checking blood sugars: twice daily.    Blood sugar testing frequency justification: None  Results are as follows:         am - 140-150         bedtime - >200    Diabetic concerns: None and blood sugar frequently over 200     Symptoms of hypoglycemia (low blood sugar): none     Paresthesias (numbness or burning in feet) or sores: Yes Numbness     Date of last diabetic eye exam: ?    BP Readings from Last 2 Encounters:   09/12/18 120/58   06/01/18 118/62     Hemoglobin A1C (%)   Date Value   09/12/2018 7.8 (H)   03/06/2018 7.3 (H)   The last A1C 's are 8.0 to 7.8   LDL Cholesterol Calculated (mg/dL)   Date Value   09/12/2017 74   10/31/2016 85   Diabetes Management Resources    MIxed Hyperlipidemia Follow-Up      Rate your low fat/cholesterol diet?: good    Taking statin?  Yes, no muscle aches [id number] atorvastatin    Other lipid medications/supplements?:  None    Last wnl in 9-17    Anxiety Follow-Up      Status since last visit: No change    Other associated symptoms:None    Complicating factors:   Significant life event: No   Current substance abuse: None  Depression symptoms: No  OPAL-7 SCORE 9/12/2018   Total Score 0 (minimal anxiety)   Total Score 0   OPAL-7    Asthma-Uncontrolled     Was ACT completed today?    Yes    ACT Total Scores 9/12/2018   ACT TOTAL SCORE -   ASTHMA ER VISITS -   ASTHMA HOSPITALIZATIONS -   ACT TOTAL SCORE (Goal Greater than or Equal to 20) 13   In the past 12 months, how many times did you visit the emergency room for your asthma without being admitted to the hospital? 0   In the past 12 months, how many times were you hospitalized overnight because of your asthma? 0       Recent asthma triggers that patient is dealing with: None     Worse last few weeks     meds advair bid and occas albut MDI but poor technic  "      Amount of exercise or physical activity: None    Problems taking medications regularly: No    Medication side effects: none    Diet: regular (no restrictions)      NONMORBID OBESITY    -BMI = 36  --sedentary life style   -comorbid HTN, mnicroalbuminuria , hi LDL     Hypertension Follow-up      Outpatient blood pressures are not being checked.   Here < 140/80    Low Salt Diet: not monitoring salt      Problem list and histories reviewed & adjusted, as indicated.  Additional history: as documented    Labs reviewed in EPIC    Reviewed and updated as needed this visit by clinical staff  Tobacco  Allergies  Meds  Problems  Med Hx  Surg Hx  Fam Hx  Soc Hx        Reviewed and updated as needed this visit by Provider         ROS:  CONSTITUTIONAL: NEGATIVE for fever, chills, change in weight  INTEGUMENTARY/SKIN: NEGATIVE for worrisome rashes, moles or lesions  EYES: NEGATIVE for vision changes or irritation  ENT/MOUTH: NEGATIVE for ear, mouth and throat problems  RESP: NEGATIVE for significant cough or SOB  BREAST: NEGATIVE for masses, tenderness or discharge  CV: NEGATIVE for chest pain, palpitations or peripheral edema  GI: NEGATIVE for nausea, abdominal pain, heartburn, or change in bowel habits  : NEGATIVE for frequency, dysuria, or hematuria  MUSCULOSKELETAL: NEGATIVE for significant arthralgias or myalgia  NEURO: NEGATIVE for weakness, dizziness or paresthesias  ENDOCRINE: NEGATIVE for temperature intolerance, skin/hair changes  HEME: NEGATIVE for bleeding problems  PSYCHIATRIC: NEGATIVE for changes in mood or affect    OBJECTIVE:     /60  Pulse 74  Temp 98.3  F (36.8  C) (Tympanic)  Resp 14  Ht 5' 10\" (1.778 m)  Wt 248 lb (112.5 kg)  SpO2 98%  BMI 35.58 kg/m2  Body mass index is 35.58 kg/(m^2).  GENERAL: healthy, alert, no distress, obese, frail and elderly  EYES: Eyes grossly normal to inspection, PERRL and conjunctivae and sclerae normal  RESP: lungs clear to auscultation - no rales, " rhonchi or wheezes  CV: regular rate and rhythm, normal S1 S2, no S3 or S4, no murmur, click or rub, no peripheral edema and peripheral pulses strong  MS: no gross musculoskeletal defects noted, no edema  SKIN: no suspicious lesions or rashes  NEURO: Normal strength and tone, mentation intact and speech normal  PSYCH: mentation appears normal, affect normal/bright    Diagnostic Test Results:  Results for orders placed or performed in visit on 09/12/18   HEMOGLOBIN A1C   Result Value Ref Range    Hemoglobin A1C 7.8 (H) 0 - 5.6 %   Albumin Random Urine Quantitative with Creat Ratio   Result Value Ref Range    Creatinine Urine 168 mg/dL    Albumin Urine mg/L 50 mg/L    Albumin Urine mg/g Cr 30.00 (H) 0 - 17 mg/g Cr   Comprehensive metabolic panel   Result Value Ref Range    Sodium 139 133 - 144 mmol/L    Potassium 3.8 3.4 - 5.3 mmol/L    Chloride 105 94 - 109 mmol/L    Carbon Dioxide 26 20 - 32 mmol/L    Anion Gap 8 3 - 14 mmol/L    Glucose 216 (H) 70 - 99 mg/dL    Urea Nitrogen 15 7 - 30 mg/dL    Creatinine 0.95 0.66 - 1.25 mg/dL    GFR Estimate 77 >60 mL/min/1.7m2    GFR Estimate If Black >90 >60 mL/min/1.7m2    Calcium 9.4 8.5 - 10.1 mg/dL    Bilirubin Total 0.3 0.2 - 1.3 mg/dL    Albumin 3.3 (L) 3.4 - 5.0 g/dL    Protein Total 7.3 6.8 - 8.8 g/dL    Alkaline Phosphatase 89 40 - 150 U/L    ALT 28 0 - 70 U/L    AST 13 0 - 45 U/L   CBC with platelets differential   Result Value Ref Range    WBC 4.5 4.0 - 11.0 10e9/L    RBC Count 4.73 4.4 - 5.9 10e12/L    Hemoglobin 13.9 13.3 - 17.7 g/dL    Hematocrit 41.3 40.0 - 53.0 %    MCV 87 78 - 100 fl    MCH 29.4 26.5 - 33.0 pg    MCHC 33.7 31.5 - 36.5 g/dL    RDW 13.4 10.0 - 15.0 %    Platelet Count 174 150 - 450 10e9/L    Diff Method Automated Method     % Neutrophils 42.1 %    % Lymphocytes 36.2 %    % Monocytes 15.1 %    % Eosinophils 6.2 %    % Basophils 0.4 %    Absolute Neutrophil 1.9 1.6 - 8.3 10e9/L    Absolute Lymphocytes 1.6 0.8 - 5.3 10e9/L    Absolute Monocytes 0.7  0.0 - 1.3 10e9/L    Absolute Eosinophils 0.3 0.0 - 0.7 10e9/L    Absolute Basophils 0.0 0.0 - 0.2 10e9/L       ASSESSMENT/PLAN:               ICD-10-CM    1. Severe persistent asthma with exacerbation J45.51 ipratropium - albuterol 0.5 mg/2.5 mg/3 mL (DUONEB) 0.5-2.5 (3) MG/3ML neb solution   2. Screen for colon cancer Z12.11    3. Need for prophylactic vaccination and inoculation against influenza Z23 Each additional admin.  (Right click and add QUANTITY)  [00664]     FLU VACCINE, INCREASED ANTIGEN, PRESV FREE, AGE 65+ [40002]   4. Need for prophylactic vaccination against Streptococcus pneumoniae (pneumococcus) Z23 Pneumococcal vaccine 13 valent PCV13 IM (Prevnar) [47271]     1st  Administration  [59136]   5. Benign essential hypertension I10    6. Mixed hyperlipidemia E78.2 Lipid panel reflex to direct LDL Fasting     ALT   7. Class 2 severe obesity due to excess calories with serious comorbidity and body mass index (BMI) of 36.0 to 36.9 in adult (H) E66.01     Z68.36    8. Mild persistent asthma without complication J45.30 order for DME   9. Screening for prostate cancer Z12.5 Prostate spec antigen screen   10. Need for vaccination Z23        Patient Instructions   1.  Weight Loss Tips  1. Do not eat after 6 hrs before your expected bedtime  2. Have your heaviest meal for breakfast, a slightly lighter meal at lunch and a snack 6 hrs before bed  3. No sugar/calorie drinks except milk ie no fruit juice, pop, alcohol.  4. Drink milk 30min before meals to decrease your hunger. Also it is excellent as part of your last meal of the day snack  5. Drink lots of water  6. Increase fiber in diet: all bran cereal, salads, popcorn etc  7. Have only one small serving of fruit a day about 1/2 cup (as this is high in sugar)  8. EXERCISE is the bottom line. Without it, you will gain weight even on a low calorie diet. Best if done 2-3X a day as can    Being overweight contributes to high blood pressure and high cholesterol, both  "of which cause heart attacks, strokes and kidney failure, prediabetes and diabetes, arthritis, and liver disease     3. Use the duoneb nebulizer with the machine at least morning and night and 1- times in between .  It's the best     4. Please do the Advair  Purple disc after you do the nebulizer     5. See your eye doctor --soon !!     6. Your diabetes is still too high on 4 pills for the diabetes.. You need to start### insulin ####  This diabetes will cause complications , including strokes, heart attacks , kidney failure, loss of toes ,       Koki Hudson MD  [facility]    Time spent with the patient 45mins, more than 50% in counseling and coordinating care, Re above medical problems.:    1. DM --continues out of control on 4 po meds  Pt should be on insulin and then only metformin  Insulin would control the DM as the 4 po meds have been unable to do  He's afraid of \"low blood sugars\" which is not a likely possibility with his hi sugars and A1C around 8   Discussed all with pt  And son.  the patient is recalcitrant and needs to consider this     2. ASTHMA     advair bid but algut MDI is difficult for him   \-would do better with nebs =--ordered today     3. Low Income Housing   The land lady won't let the relatives stay there at nite in his 1 bedroom apt   They state they need to be there to be sure he takes his meds  So a son or dotter or grandchild is there almost q nite   Wrote a letter to see if they will be allowed to stay --as is easier for them and medically helps the pt     Weight management plan: Discussed healthy diet and exercise guidelines and patient will follow up in 1 month in clinic to re-evaluate.    Koki Hudson MD    "

## 2018-10-25 NOTE — LETTER
My Asthma Action Plan  Name: Suze Devlin   YOB: 1945  Date: 10/25/2018   My doctor: Koki Hudson MD   My clinic: [facility]        My Control Medicine: Fluticasone + salmeterol (Advair) -  Diskus 250/50 mcg bid  My Rescue Medicine: Albuterol (Proair/Ventolin/Proventil) inhaler prn   My Asthma Severity: mild persistent  Avoid your asthma triggers: Patient is unaware of triggers  None            GREEN ZONE   Good Control    I feel good    No cough or wheeze    Can work, sleep and play without asthma symptoms       Take your asthma control medicine every day.     1. If exercise triggers your asthma, take your rescue medication    15 minutes before exercise or sports, and    During exercise if you have asthma symptoms  2. Spacer to use with inhaler: If you have a spacer, make sure to use it with your inhaler             YELLOW ZONE Getting Worse  I have ANY of these:    I do not feel good    Cough or wheeze    Chest feels tight    Wake up at night   1. Keep taking your Green Zone medications  2. Start taking your rescue medicine:    every 20 minutes for up to 1 hour. Then every 4 hours for 24-48 hours.  3. If you stay in the Yellow Zone for more than 12-24 hours, contact your doctor.  4. If you do not return to the Green Zone in 12-24 hours or you get worse, start taking your oral steroid medicine if prescribed by your provider.           RED ZONE Medical Alert - Get Help  I have ANY of these:    I feel awful    Medicine is not helping    Breathing getting harder    Trouble walking or talking    Nose opens wide to breathe       1. Take your rescue medicine NOW  2. If your provider has prescribed an oral steroid medicine, start taking it NOW  3. Call your doctor NOW  4. If you are still in the Red Zone after 20 minutes and you have not reached your doctor:    Take your rescue medicine again and    Call 911 or go to the emergency room right away    See your regular  doctor within 2 weeks of an Emergency Room or Urgent Care visit for follow-up treatment.          Annual Reminders:  Meet with Asthma Educator,  Flu Shot in the Fall, consider Pneumonia Vaccination for patients with asthma (aged 19 and older).    Pharmacy: [facility] - [place], [place] - [address]                      Asthma Triggers  How To Control Things That Make Your Asthma Worse    Triggers are things that make your asthma worse.  Look at the list below to help you find your triggers and what you can do about them.  You can help prevent asthma flare-ups by staying away from your triggers.      Trigger                                                          What you can do   Cigarette Smoke  Tobacco smoke can make asthma worse. Do not allow smoking in your home, car or around you.  Be sure no one smokes at a child s day care or school.  If you smoke, ask your health care provider for ways to help you quit.  Ask family members to quit too.  Ask your health care provider for a referral to Quit Plan to help you quit smoking, or call 8-907-966-PLAN.     Colds, Flu, Bronchitis  These are common triggers of asthma. Wash your hands often.  Don t touch your eyes, nose or mouth.  Get a flu shot every year.     Dust Mites  These are tiny bugs that live in cloth or carpet. They are too small to see. Wash sheets and blankets in hot water every week.   Encase pillows and mattress in dust mite proof covers.  Avoid having carpet if you can. If you have carpet, vacuum weekly.   Use a dust mask and HEPA vacuum.   Pollen and Outdoor Mold  Some people are allergic to trees, grass, or weed pollen, or molds. Try to keep your windows closed.  Limit time out doors when pollen count is high.   Ask you health care provider about taking medicine during allergy season.     Animal Dander  Some people are allergic to skin flakes, urine or saliva from pets with fur or feathers. Keep pets with fur or feathers out of  your home.    If you can t keep the pet outdoors, then keep the pet out of your bedroom.  Keep the bedroom door closed.  Keep pets off cloth furniture and away from stuffed toys.     Mice, Rats, and Cockroaches  Some people are allergic to the waste from these pests.   Cover food and garbage.  Clean up spills and food crumbs.  Store grease in the refrigerator.   Keep food out of the bedroom.   Indoor Mold  This can be a trigger if your home has high moisture. Fix leaking faucets, pipes, or other sources of water.   Clean moldy surfaces.  Dehumidify basement if it is damp and smelly.   Smoke, Strong Odors, and Sprays  These can reduce air quality. Stay away from strong odors and sprays, such as perfume, powder, hair spray, paints, smoke incense, paint, cleaning products, candles and new carpet.   Exercise or Sports  Some people with asthma have this trigger. Be active!  Ask your doctor about taking medicine before sports or exercise to prevent symptoms.    Warm up for 5-10 minutes before and after sports or exercise.     Other Triggers of Asthma  Cold air:  Cover your nose and mouth with a scarf.  Sometimes laughing or crying can be a trigger.  Some medicines and food can trigger asthma.

## 2018-10-25 NOTE — PROGRESS NOTES
Injectable Influenza Immunization Documentation    1.  Are you sick today? (Fever of 100.5 or higher on the day of the clinic)   No    2.  Have you ever had Guillain-Belding Syndrome within 6 weeks of an influenza vaccionation?  No    3. Do you have a life-threatening allergy to eggs?  No    4. Do you have a life-threatening allergy to a component of the vaccine? May include antibiotics, gelatin or latex.  No     5. Have you ever had a reaction to a dose of flu vaccine that needed immediate medical attention?  No     Form completed by Princess SUE Andersen CMA

## 2018-10-25 NOTE — MR AVS SNAPSHOT
After Visit Summary   10/25/2018    Suze Devlin    MRN: [id number]           Patient Information     Date Of Birth          1945        Visit Information        Provider Department      10/25/2018 11:00 AM Koki Hudson MD; [place] [employer] [facility]        Today's Diagnoses     Severe persistent asthma with exacerbation    -  1    Screen for colon cancer        Need for prophylactic vaccination and inoculation against influenza        Need for prophylactic vaccination against Streptococcus pneumoniae (pneumococcus)        Benign essential hypertension        Mixed hyperlipidemia        Class 2 severe obesity due to excess calories with serious comorbidity and body mass index (BMI) of 36.0 to 36.9 in adult (H)        Mild persistent asthma without complication        Screening for prostate cancer          Care Instructions    1.  Weight Loss Tips  1. Do not eat after 6 hrs before your expected bedtime  2. Have your heaviest meal for breakfast, a slightly lighter meal at lunch and a snack 6 hrs before bed  3. No sugar/calorie drinks except milk ie no fruit juice, pop, alcohol.  4. Drink milk 30min before meals to decrease your hunger. Also it is excellent as part of your last meal of the day snack  5. Drink lots of water  6. Increase fiber in diet: all bran cereal, salads, popcorn etc  7. Have only one small serving of fruit a day about 1/2 cup (as this is high in sugar)  8. EXERCISE is the bottom line. Without it, you will gain weight even on a low calorie diet. Best if done 2-3X a day as can    Being overweight contributes to high blood pressure and high cholesterol, both of which cause heart attacks, strokes and kidney failure, prediabetes and diabetes, arthritis, and liver disease     3. Use the duoneb nebulizer with the machine at least morning and night and 1- times in between .  It's the best     4. Please do the Advair   Purple disc after you do the nebulizer     5. See your eye doctor --soon !!     6. Your diabetes is still too high on 4 pills for the diabetes.. You need to start### insulin ####  This diabetes will cause complications , including strokes, heart attacks , kidney failure, loss of toes ,           Follow-ups after your visit        Follow-up notes from your care team     Return in about 3 weeks (around 11/15/2018) for diabetes, asthma.      Who to contact     If you have questions or need follow up information about today's clinic visit or your schedule please contact [facility] directly at [phone].  Normal or non-critical lab and imaging results will be communicated to you by MyChart, letter or phone within 4 business days after the clinic has received the results. If you do not hear from us within 7 days, please contact the clinic through [employer] or phone. If you have a critical or abnormal lab result, we will notify you by phone as soon as possible.  Submit refill requests through [employer] or call your pharmacy and they will forward the refill request to us. Please allow 3 business days for your refill to be completed.          Additional Information About Your Visit        MyChart Information     [employer] gives you secure access to your electronic health record. If you see a primary care provider, you can also send messages to your care team and make appointments. If you have questions, please call your primary care clinic.  If you do not have a primary care provider, please call [phone] and they will assist you.        Care EveryWhere ID     This is your Care EveryWhere ID. This could be used by other organizations to access your [place] medical records  [id number]         Blood Pressure from Last 3 Encounters:   09/12/18 120/58   06/01/18 118/62   03/16/18 116/60    Weight from Last 3 Encounters:   09/12/18 250 lb (113.4 kg)   06/01/18 245 lb 8 oz (111.4 kg)   03/16/18  250 lb (113.4 kg)              We Performed the Following     ALT     Asthma Action Plan (AAP)     Lipid panel reflex to direct LDL Fasting     Prostate spec antigen screen          Today's Medication Changes          These changes are accurate as of 10/25/18 12:50 PM.  If you have any questions, ask your nurse or doctor.               Start taking these medicines.        Dose/Directions    order for DME   Used for:  Mild persistent asthma without complication   Started by:  Koki Hudson MD        Equipment being ordered: Nebulizer   Quantity:  1 each   Refills:  0         These medicines have changed or have updated prescriptions.        Dose/Directions    * ipratropium - albuterol 0.5 mg/2.5 mg/3 mL 0.5-2.5 (3) MG/3ML neb solution   Commonly known as:  DUONEB   This may have changed:  Another medication with the same name was added. Make sure you understand how and when to take each.   Used for:  Acute bronchitis due to other specified organisms, Wheezing, Mild intermittent asthma with acute exacerbation   Changed by:  Koki Hudson MD        Dose:  1 vial   Take 1 vial (3 mLs) by nebulization every 6 hours as needed for shortness of breath / dyspnea or wheezing   Quantity:  2.5 mL   Refills:  o       * ipratropium - albuterol 0.5 mg/2.5 mg/3 mL 0.5-2.5 (3) MG/3ML neb solution   Commonly known as:  DUONEB   This may have changed:  You were already taking a medication with the same name, and this prescription was added. Make sure you understand how and when to take each.   Used for:  Severe persistent asthma with exacerbation   Changed by:  Koki Hudson MD        Dose:  1 vial   Take 1 vial (3 mLs) by nebulization every 6 hours as needed for shortness of breath / dyspnea or wheezing   Quantity:  90 vial   Refills:  0       * Notice:  This list has 2 medication(s) that are the same as other medications prescribed for you. Read the directions carefully, and ask your  doctor or other care provider to review them with you.         Where to get your medicines      These medications were sent to [facility] - [place], [place] - [address]  [address], [facility] [address]     Phone:  [phone]     ipratropium - albuterol 0.5 mg/2.5 mg/3 mL 0.5-2.5 (3) MG/3ML neb solution         Some of these will need a paper prescription and others can be bought over the counter.  Ask your nurse if you have questions.     Bring a paper prescription for each of these medications     order for DME                Primary Care Provider Office Phone # Fax #    Zack Hudson [phone] [address] [facility] [facility] [address]        Equal Access to Services     CHRISTIANO FRANCIS : Hadii aad ku hadasho Sobrian, waaxda luqadaha, qaybta kaalmada adeegyada, lisa garvin . So [facility] [phone].    ATENCIÓN: Si habla español, tiene a wells disposición servicios gratuitos de asistencia lingüística. Llame al [phone].    We comply with applicable federal civil rights laws and [place] laws. We do not discriminate on the basis of race, color, national origin, age, disability, sex, sexual orientation, or gender identity.            Thank you!     Thank you for choosing [facility]  for your care. Our goal is always to provide you with excellent care. Hearing back from our patients is one way we can continue to improve our services. Please take a few minutes to complete the written survey that you may receive in the mail after your visit with us. Thank you!             Your Updated Medication List - Protect others around you: Learn how to safely use, store and throw away your medicines at www.disposemymeds.org.          This list is accurate as of 10/25/18 12:50 PM.  Always use your most recent med list.                   Brand Name Dispense Instructions for use Diagnosis    acetaminophen  500 MG Caps     120 capsule    Take 1 tablet by mouth 4 times daily    Primary osteoarthritis of both knees       albuterol 108 (90 Base) MCG/ACT inhaler    PROAIR HFA/PROVENTIL HFA/VENTOLIN HFA    1 Inhaler    Inhale 2 puffs into the lungs 4 times daily as needed    Mild persistent asthma with acute exacerbation       aspirin 81 MG EC tablet     90 tablet    Take 1 tablet (81 mg) by mouth daily    Hyperlipidemia LDL goal <100       atorvastatin 20 MG tablet    LIPITOR    90 tablet    Take 1 tablet (20 mg) by mouth daily    Hyperlipidemia LDL goal <100, Flatulence, eructation, and gas pain       B-12 1000 MCG Tbcr     90 tablet    Take 1,000 mcg by mouth daily    Vitamin B 12 deficiency       blood glucose monitoring lancets     3 Box    Use to test blood sugars  TWICE DAILY    or as directed.    Type 2 diabetes mellitus without complication (H)       * blood glucose monitoring meter device kit    no brand specified    1 kit    Use to test blood sugar two times daily or as directed.    Type 2 diabetes mellitus with diabetic nephropathy (H)       * blood glucose monitoring meter device kit     1 kit    Use to test blood sugars TWICE DAILY PRN  times daily or as directed.    Type 2 diabetes mellitus without complication, without long-term current use of insulin (H)       blood glucose monitoring test strip    no brand specified    100 each    Use to test blood sugars two times daily or as directed    Type 2 diabetes mellitus without complication, without long-term current use of insulin (H)       diclofenac 1 % Gel topical gel    VOLTAREN    100 g    Apply 4 grams to knees or 2 grams to hands four times daily using enclosed dosing card.    Primary osteoarthritis of left knee       finasteride 5 MG tablet    PROSCAR    90 tablet    Take 1 tablet (5 mg) by mouth daily    BPH associated with nocturia       fluticasone-salmeterol 100-50 MCG/DOSE diskus inhaler    ADVAIR    1 Inhaler    Inhale 1 puff into the lungs 2 times  daily    Mild persistent asthma with acute exacerbation       glipiZIDE 10 MG 24 hr tablet    GLUCOTROL XL    30 tablet    Take 1 tablet (10 mg) by mouth daily    Type 2 diabetes mellitus with diabetic nephropathy, without long-term current use of insulin (H)       * ipratropium - albuterol 0.5 mg/2.5 mg/3 mL 0.5-2.5 (3) MG/3ML neb solution    DUONEB    2.5 mL    Take 1 vial (3 mLs) by nebulization every 6 hours as needed for shortness of breath / dyspnea or wheezing    Acute bronchitis due to other specified organisms, Wheezing, Mild intermittent asthma with acute exacerbation       * ipratropium - albuterol 0.5 mg/2.5 mg/3 mL 0.5-2.5 (3) MG/3ML neb solution    DUONEB    90 vial    Take 1 vial (3 mLs) by nebulization every 6 hours as needed for shortness of breath / dyspnea or wheezing    Severe persistent asthma with exacerbation       lidocaine 5 % ointment    XYLOCAINE    300 g    One application 4 x daily to affected areas lower back and knees if necessary    Midline low back pain with sciatica, sciatica laterality unspecified       losartan 25 MG tablet    COZAAR    90 tablet    Take 1 tablet (25 mg) by mouth daily    Hypertension goal BP (blood pressure) < 140/80       metFORMIN modified 1000 MG 24 hr tablet    GLUMETZA    90 tablet    Take 1 tablet (1,000 mg) by mouth daily (with dinner)    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 due to underlying condition (H)       polyethylene glycol powder    MIRALAX    119 g    Take 17 g (1 capful) by mouth daily as needed for constipation    Constipation, unspecified constipation type       senna-docusate 8.6-50 MG per tablet    SENOKOT-S;PERICOLACE    120 tablet    Take 2 tablets by mouth 2 times daily    Slow transit constipation       sitagliptin 50 MG tablet    JANUVIA    90 tablet    Take 1 tablet (50 mg) by mouth daily    Type 2 diabetes mellitus with diabetic nephropathy, without long-term current use of insulin (H)       tadalafil 5 MG tablet    CIALIS    90 tablet    Take 1 tablet (5 mg) by mouth daily Never use with nitroglycerin, terazosin or doxazosin.    Sexual dysfunction       tamsulosin 0.4 MG capsule    FLOMAX    90 capsule    Take 1 capsule (0.4 mg) by mouth daily    Nocturia       triamcinolone 0.1 % cream    KENALOG    80 g    Apply sparingly to affected area three times daily as needed    Neurodermatitis, localized       vitamin D 2000 units tablet     100 tablet    Take 2,000 Units by mouth daily    Vitamin D insufficiency       * Notice:  This list has 4 medication(s) that are the same as other medications prescribed for you. Read the directions carefully, and ask your doctor or other care provider to review them with you.

## 2018-10-25 NOTE — PATIENT INSTRUCTIONS
1.  Weight Loss Tips  1. Do not eat after 6 hrs before your expected bedtime  2. Have your heaviest meal for breakfast, a slightly lighter meal at lunch and a snack 6 hrs before bed  3. No sugar/calorie drinks except milk ie no fruit juice, pop, alcohol.  4. Drink milk 30min before meals to decrease your hunger. Also it is excellent as part of your last meal of the day snack  5. Drink lots of water  6. Increase fiber in diet: all bran cereal, salads, popcorn etc  7. Have only one small serving of fruit a day about 1/2 cup (as this is high in sugar)  8. EXERCISE is the bottom line. Without it, you will gain weight even on a low calorie diet. Best if done 2-3X a day as can    Being overweight contributes to high blood pressure and high cholesterol, both of which cause heart attacks, strokes and kidney failure, prediabetes and diabetes, arthritis, and liver disease     3. Use the duoneb nebulizer with the machine at least morning and night and 1- times in between .  It's the best     4. Please do the Advair  Purple disc after you do the nebulizer     5. See your eye doctor --soon !!     6. Your diabetes is still too high on 4 pills for the diabetes.. You need to start### insulin ####  This diabetes will cause complications , including strokes, heart attacks , kidney failure, loss of toes ,

## 2018-10-25 NOTE — LETTER
[facility]  [address]  [facility] [address]  [phone]                                                                                                           Suze Devlin  [address] [address]    October 25, 2018      Dear Suze,    Seen by me today.    You may have visitors who stay overnight     Thank you for choosing [facility].  We appreciate the opportunity to serve you and look forward to supporting your healthcare needs in the future.    If you have any questions or concerns, please call me or my staff at [phone].      Sincerely,    Koki Hudson MD

## 2018-10-25 NOTE — NURSING NOTE
"Chief Complaint   Patient presents with     Recheck Medication     /60  Pulse 74  Temp 98.3  F (36.8  C) (Tympanic)  Resp 14  Ht 5' 10\" (1.778 m)  Wt 248 lb (112.5 kg)  SpO2 98%  BMI 35.58 kg/m2 Estimated body mass index is 35.58 kg/(m^2) as calculated from the following:    Height as of this encounter: 5' 10\" (1.778 m).    Weight as of this encounter: 248 lb (112.5 kg).  BP completed using cuff size: alex Martin CMA    Health Maintenance Due   Topic Date Due     AORTIC ANEURYSM SCREENING (SYSTEM ASSIGNED)  01/01/2009     EYE EXAM Q1 YEAR  06/26/2018     FIT Q1 YR  09/14/2018     Health Maintenance reviewed at today's visit patient asked to schedule/complete:   Diabetes:  Completed at today's visit.    "

## 2018-10-25 NOTE — PROGRESS NOTES
"  SUBJECTIVE:   Suze Devlin is a 73 year old male who presents to clinic today for the following health issues:    {Superlists:008480}    {additional problems for provider to add:777570}    Problem list and histories reviewed & adjusted, as indicated.  Additional history: {NONE - AS DOCUMENTED:592525::\"as documented\"}    {HIST REVIEW/ LINKS 2:130239}    Reviewed and updated as needed this visit by clinical staff       Reviewed and updated as needed this visit by Provider         {PROVIDER CHARTING PREFERENCE:487009}    "

## 2018-10-26 ENCOUNTER — TELEPHONE (OUTPATIENT)
Dept: FAMILY MEDICINE | Facility: CLINIC | Age: 73
End: 2018-10-26

## 2018-10-26 PROBLEM — E11.49 TYPE II OR UNSPECIFIED TYPE DIABETES MELLITUS WITH NEUROLOGICAL MANIFESTATIONS, UNCONTROLLED(250.62) (H): Status: ACTIVE | Noted: 2018-10-26

## 2018-10-26 PROBLEM — E11.65 TYPE II OR UNSPECIFIED TYPE DIABETES MELLITUS WITH NEUROLOGICAL MANIFESTATIONS, UNCONTROLLED(250.62) (H): Status: ACTIVE | Noted: 2018-10-26

## 2018-10-26 LAB
ALT SERPL W P-5'-P-CCNC: 26 U/L (ref 0–70)
CHOLEST SERPL-MCNC: 146 MG/DL
HDLC SERPL-MCNC: 52 MG/DL
LDLC SERPL CALC-MCNC: 65 MG/DL
NONHDLC SERPL-MCNC: 94 MG/DL
PSA SERPL-ACNC: 2.37 UG/L (ref 0–4)
TRIGL SERPL-MCNC: 147 MG/DL

## 2018-10-26 ASSESSMENT — ANXIETY QUESTIONNAIRES: GAD7 TOTAL SCORE: 10

## 2018-10-26 ASSESSMENT — ASTHMA QUESTIONNAIRES: ACT_TOTALSCORE: 11

## 2018-10-26 NOTE — TELEPHONE ENCOUNTER
Gina Rosas  calling to help Suze.  He came in the office yesterday. Family needs access to his apartment.  HUD needs Dr to sign form that says ok for family to have a hernandez and fob.       Dr Hudson signed and I faxed to     Krissy Bernal RN- Triage [employer]

## 2018-11-16 ENCOUNTER — TELEPHONE (OUTPATIENT)
Dept: FAMILY MEDICINE | Facility: CLINIC | Age: 73
End: 2018-11-16

## 2018-11-16 NOTE — TELEPHONE ENCOUNTER
Reason for Call: Request for an order or referral:    Order or referral being requested: Nebulizer     Date needed: as soon as possible    Has the patient been seen by the PCP for this problem? YES    Additional comments: Patient was prescribed nebulizer medication but does not have a nebulizer machine. Please order for patient.     Phone number Patient can be reached at:  Home number on file [phone] (home)  Tierra Flippin care nurse [phone]    Best Time:  Anytime     Can we leave a detailed message on this number?  YES    Call taken on 11/16/2018 at 9:56 AM by Tiera Bacon

## 2018-11-23 ENCOUNTER — TELEPHONE (OUTPATIENT)
Dept: FAMILY MEDICINE | Facility: CLINIC | Age: 73
End: 2018-11-23

## 2018-11-23 DIAGNOSIS — Z53.9 DIAGNOSIS NOT YET DEFINED: Primary | ICD-10-CM

## 2018-11-23 PROCEDURE — G0179 MD RECERTIFICATION HHA PT: HCPCS | Performed by: FAMILY MEDICINE

## 2018-11-23 NOTE — TELEPHONE ENCOUNTER
Our goal is to have forms completed within 72 hours, however some forms may require a visit or additional information.    What clinic location was the form placed at [facility]     Who is the form from? Home Care  Where did the form come from? Faxed to clinic   The form was placed in the inbox of Zack Hudson Jr MD      Please fax to [phone]    Additional comments: Cert period 10/20/2018-12/27/2018    Call take on 11/23/2018 at 2:43 PM by Katina Marie

## 2018-12-01 DIAGNOSIS — E78.5 HYPERLIPIDEMIA LDL GOAL <100: Chronic | ICD-10-CM

## 2018-12-01 DIAGNOSIS — R14.2 FLATULENCE, ERUCTATION, AND GAS PAIN: ICD-10-CM

## 2018-12-01 DIAGNOSIS — R35.1 NOCTURIA: ICD-10-CM

## 2018-12-01 DIAGNOSIS — R14.3 FLATULENCE, ERUCTATION, AND GAS PAIN: ICD-10-CM

## 2018-12-01 DIAGNOSIS — R14.1 FLATULENCE, ERUCTATION, AND GAS PAIN: ICD-10-CM

## 2018-12-01 NOTE — TELEPHONE ENCOUNTER
"Requested Prescriptions   Pending Prescriptions Disp Refills     tamsulosin (FLOMAX) 0.4 MG capsule  Last Written Prescription Date:  05/22/2017  Last Fill Quantity: 90,  # refills: 3   Last office visit: 10/25/2018 with prescribing provider:  BELÉN MORA    Future Office Visit:     90 capsule 3     Sig: Take 1 capsule (0.4 mg) by mouth daily    Alpha Blockers Failed    12/1/2018  9:37 AM       Failed - Patient does not have Tadalafil, Vardenafil, or Sildenafil on their medication list       Passed - Blood pressure under 140/90 in past 12 months    BP Readings from Last 3 Encounters:   10/25/18 130/60   09/12/18 120/58   06/01/18 118/62                Passed - Recent (12 mo) or future (30 days) visit within the authorizing provider's specialty    Patient had office visit in the last 12 months or has a visit in the next 30 days with authorizing provider or within the authorizing provider's specialty.  See \"Patient Info\" tab in inbasket, or \"Choose Columns\" in Meds & Orders section of the refill encounter.             Passed - Patient is 18 years of age or older        atorvastatin (LIPITOR) 20 MG tablet  Last Written Prescription Date:  10/04/2017  Last Fill Quantity: 90,  # refills: 3   Last office visit: 10/25/2018 with prescribing provider:  BELÉN MORA    Future Office Visit:     90 tablet 3     Sig: Take 1 tablet (20 mg) by mouth daily    Statins Protocol Passed    12/1/2018  9:37 AM       Passed - LDL on file in past 12 months    Recent Labs   Lab Test  10/25/18   1254   LDL  65            Passed - No abnormal creatine kinase in past 12 months    No lab results found.            Passed - Recent (12 mo) or future (30 days) visit within the authorizing provider's specialty    Patient had office visit in the last 12 months or has a visit in the next 30 days with authorizing provider or within the authorizing provider's specialty.  See \"Patient Info\" tab in inbasket, or \"Choose Columns\" in Meds & Orders section of " the refill encounter.             Passed - Patient is age 18 or older

## 2018-12-03 RX ORDER — ATORVASTATIN CALCIUM 20 MG/1
20 TABLET, FILM COATED ORAL DAILY
Qty: 90 TABLET | Refills: 3 | Status: SHIPPED | OUTPATIENT
Start: 2018-12-03 | End: 2020-02-10

## 2018-12-03 RX ORDER — TAMSULOSIN HYDROCHLORIDE 0.4 MG/1
0.4 CAPSULE ORAL DAILY
Qty: 90 CAPSULE | Refills: 3 | Status: SHIPPED | OUTPATIENT
Start: 2018-12-03 | End: 2020-01-27

## 2018-12-03 NOTE — TELEPHONE ENCOUNTER
atorvastatin (LIPITOR) 20 MG tablet:  Prescription approved per G Refill Protocol.      tamsulosin (FLOMAX) 0.4 MG capsule:  Routing refill request to provider for review/approval because: Patient does not have Tadalafil, Vardenafil, or Sildenafil on their medication list

## 2018-12-14 ENCOUNTER — OFFICE VISIT (OUTPATIENT)
Dept: FAMILY MEDICINE | Facility: CLINIC | Age: 73
End: 2018-12-14
Payer: COMMERCIAL

## 2018-12-14 VITALS
HEART RATE: 98 BPM | TEMPERATURE: 97.4 F | BODY MASS INDEX: 36.79 KG/M2 | SYSTOLIC BLOOD PRESSURE: 130 MMHG | OXYGEN SATURATION: 98 % | WEIGHT: 257 LBS | HEIGHT: 70 IN | DIASTOLIC BLOOD PRESSURE: 50 MMHG | RESPIRATION RATE: 18 BRPM

## 2018-12-14 DIAGNOSIS — F41.9 ANXIETY: ICD-10-CM

## 2018-12-14 DIAGNOSIS — I10 BENIGN ESSENTIAL HYPERTENSION: ICD-10-CM

## 2018-12-14 DIAGNOSIS — J45.30 MILD PERSISTENT ASTHMA WITHOUT COMPLICATION: ICD-10-CM

## 2018-12-14 DIAGNOSIS — E66.812 CLASS 2 SEVERE OBESITY DUE TO EXCESS CALORIES WITH SERIOUS COMORBIDITY AND BODY MASS INDEX (BMI) OF 36.0 TO 36.9 IN ADULT (H): ICD-10-CM

## 2018-12-14 DIAGNOSIS — E78.2 MIXED HYPERLIPIDEMIA: ICD-10-CM

## 2018-12-14 DIAGNOSIS — E66.01 CLASS 2 SEVERE OBESITY DUE TO EXCESS CALORIES WITH SERIOUS COMORBIDITY AND BODY MASS INDEX (BMI) OF 36.0 TO 36.9 IN ADULT (H): ICD-10-CM

## 2018-12-14 DIAGNOSIS — E11.21 TYPE 2 DIABETES MELLITUS WITH DIABETIC NEPHROPATHY, WITHOUT LONG-TERM CURRENT USE OF INSULIN (H): Primary | ICD-10-CM

## 2018-12-14 DIAGNOSIS — R60.0 LOCALIZED EDEMA: ICD-10-CM

## 2018-12-14 DIAGNOSIS — M79.10 MYALGIA: ICD-10-CM

## 2018-12-14 LAB — HBA1C MFR BLD: 8.5 % (ref 0–5.6)

## 2018-12-14 PROCEDURE — 83036 HEMOGLOBIN GLYCOSYLATED A1C: CPT | Performed by: FAMILY MEDICINE

## 2018-12-14 PROCEDURE — 99215 OFFICE O/P EST HI 40 MIN: CPT | Performed by: FAMILY MEDICINE

## 2018-12-14 PROCEDURE — 36415 COLL VENOUS BLD VENIPUNCTURE: CPT | Performed by: FAMILY MEDICINE

## 2018-12-14 ASSESSMENT — ANXIETY QUESTIONNAIRES
3. WORRYING TOO MUCH ABOUT DIFFERENT THINGS: NEARLY EVERY DAY
7. FEELING AFRAID AS IF SOMETHING AWFUL MIGHT HAPPEN: NEARLY EVERY DAY
GAD7 TOTAL SCORE: 7
6. BECOMING EASILY ANNOYED OR IRRITABLE: SEVERAL DAYS
1. FEELING NERVOUS, ANXIOUS, OR ON EDGE: NOT AT ALL
5. BEING SO RESTLESS THAT IT IS HARD TO SIT STILL: NOT AT ALL
2. NOT BEING ABLE TO STOP OR CONTROL WORRYING: NOT AT ALL
IF YOU CHECKED OFF ANY PROBLEMS ON THIS QUESTIONNAIRE, HOW DIFFICULT HAVE THESE PROBLEMS MADE IT FOR YOU TO DO YOUR WORK, TAKE CARE OF THINGS AT HOME, OR GET ALONG WITH OTHER PEOPLE: NOT DIFFICULT AT ALL

## 2018-12-14 ASSESSMENT — MIFFLIN-ST. JEOR: SCORE: 1911.99

## 2018-12-14 ASSESSMENT — PATIENT HEALTH QUESTIONNAIRE - PHQ9: 5. POOR APPETITE OR OVEREATING: NOT AT ALL

## 2018-12-14 NOTE — NURSING NOTE
"Chief Complaint   Patient presents with     URI     /50   Pulse 98   Temp 97.4  F (36.3  C) (Tympanic)   Resp 18   Ht 1.778 m (5' 10\")   Wt 116.6 kg (257 lb)   SpO2 98%   BMI 36.88 kg/m   Estimated body mass index is 36.88 kg/m  as calculated from the following:    Height as of this encounter: 1.778 m (5' 10\").    Weight as of this encounter: 116.6 kg (257 lb).  BP completed using cuff size: alex Martin CMA    Health Maintenance Due   Topic Date Due     ZOSTER IMMUNIZATION (1 of 2) 01/01/1994     AORTIC ANEURYSM SCREENING (SYSTEM ASSIGNED)  01/01/2009     EYE EXAM Q1 YEAR  06/26/2018     FIT Q1 YR  09/14/2018     Health Maintenance reviewed at today's visit patient asked to schedule/complete:   Diabetes:  Patient agrees to schedule    "

## 2018-12-14 NOTE — PROGRESS NOTES
SUBJECTIVE:   Suze Devlin is a 73 year old male who presents to clinic today for the following health issues:    Diabetes-Uncontrolled on 3 po meds     Patient is checking blood sugars: twice daily.    Blood sugar testing frequency justification: None  Results are as follows:         am - 140-150         bedtime - >200    Diabetic concerns: None and blood sugar frequently over 200     Symptoms of hypoglycemia (low blood sugar): none     Paresthesias (numbness or burning in feet) or sores: Yes Numbness     Date of last diabetic eye exam: ?    BP Readings from Last 2 Encounters:   09/12/18 120/58   06/01/18 118/62     Hemoglobin A1C (%)   Date Value   09/12/2018 7.8 (H)   03/06/2018 7.3 (H)  8.5 on 12-14-18     The last A1C 's are 8.0 to 7.8   LDL Cholesterol Calculated (mg/dL)   Date Value   09/12/2017 74   10/31/2016 85   Diabetes Management Resources    MIxed Hyperlipidemia Follow-Up      Rate your low fat/cholesterol diet?: good    Taking statin?  Yes, Muscle Aches  On 20mgm atorvastatin     Other lipid medications/supplements?:  None    Last wnl in 9-17    EDEMA of LEs     - minimal < 1+ pitting   -spends most of day sitting watchin g TV with legs dependent       NONMORBID OBESITY    -BMI = 36  --sedentary life style   -comorbid DM , HTN , asthma,         Anxiety Follow-Up      Status since last visit: No change    Other associated symptoms:None    Complicating factors:   Significant life event: No   Current substance abuse: None  Depression symptoms: No  OPAL-7 SCORE 9/12/2018   Total Score 0 (minimal anxiety)   Total Score 0   OPAL-7=7  PHQ-9=0      Asthma-Uncontrolled     Was ACT completed today?  = 18   Yes    ACT Total Scores 9/12/2018   ACT TOTAL SCORE -   ASTHMA ER VISITS -   ASTHMA HOSPITALIZATIONS -   ACT TOTAL SCORE (Goal Greater than or Equal to 20) 13   In the past 12 months, how many times did you visit the emergency room for your asthma without being admitted to the hospital? 0   In the past  12 months, how many times were you hospitalized overnight because of your asthma? 0       Recent asthma triggers that patient is dealing with: None     Worse last few weeks     meds advair bid and occas albut MDI but poor technic     Still using the combivent nebs only q d or so when told last time to do bid and 1-2 more times / d         Hypertension Follow-up      Outpatient blood pressures are not being checked.   Here < 140/80    Low Salt Diet: not monitoring salt      Amount of exercise or physical activity: None    Problems taking medications regularly: No    Medication side effects: none    Diet: regular (no restrictions)    TOTAL BODY AIGUE     - no specific   -has chronic fatigue   --sedentary life style   -is obese           Problem list and histories reviewed & adjusted, as indicated.  Additional history: as documented    Labs reviewed in EPIC    Reviewed and updated as needed this visit by clinical staff  Tobacco  Allergies  Meds  Problems  Med Hx  Surg Hx  Fam Hx  Soc Hx        Reviewed and updated as needed this visit by Provider  Tobacco  Allergies  Meds  Problems  Med Hx  Surg Hx  Fam Hx         ROS:  CONSTITUTIONAL: NEGATIVE for fever, chills, change in weight POS ongoing wt gain & c/o aigue   INTEGUMENTARY/SKIN: NEGATIVE for worrisome rashes, moles or lesions  EYES: NEGATIVE for vision changes or irritation  ENT/MOUTH: NEGATIVE for ear, mouth and throat problems  RESP: NEGATIVE for significant cough or SOB POS wheeze   BREAST: NEGATIVE for masses, tenderness or discharge  CV: NEGATIVE for chest pain, palpitations POS  peripheral edema  GI: NEGATIVE for nausea, abdominal pain, heartburn, or change in bowel habits  : NEGATIVE for frequency, dysuria, or hematuria  MUSCULOSKELETAL: NEGATIVE for significant arthralgias or myalgia  NEURO: NEGATIVE for weakness, dizziness or paresthesias  ENDOCRINE: NEGATIVE for temperature intolerance, skin/hair changes  HEME: NEGATIVE for bleeding  "problems  PSYCHIATRIC: NEGATIVE for changes in mood or affect    OBJECTIVE:     /50   Pulse 98   Temp 97.4  F (36.3  C) (Tympanic)   Resp 18   Ht 1.778 m (5' 10\")   Wt 116.6 kg (257 lb)   SpO2 98%   BMI 36.88 kg/m    Body mass index is 36.88 kg/m .  GENERAL: healthy, alert, no distress, obese, elderly and fatigued  EYES: Eyes grossly normal to inspection, PERRL and conjunctivae and sclerae normal  RESP: lungs clear to auscultation - no rales, rhonchi or wheezes  CV: regular rate and rhythm, normal S1 S2, no S3 or S4, no murmur, click or rub, POS 1+ or less pretib pitting = peripheral edema and peripheral pulses strong  MS: no gross musculoskeletal defects noted, no edema  SKIN: no suspicious lesions or rashes  NEURO: Normal strength and tone, mentation intact and speech normal  PSYCH: mentation appears normal, affect normal/bright    Diagnostic Test Results:  Results for orders placed or performed in visit on 12/14/18   Hemoglobin A1c   Result Value Ref Range    Hemoglobin A1C 8.5 (H) 0 - 5.6 %       ASSESSMENT/PLAN:               ICD-10-CM    1. Type 2 diabetes mellitus with diabetic nephropathy, without long-term current use of insulin (H)-uncontrolled  E11.21 Hemoglobin A1c   2. Mixed hyperlipidemia E78.2    3. Localized edema-minimal of LEs  R60.0    4. Anxiety F41.9    5. Mild persistent asthma without complication-uncontrolled -issue of increasing meds--again  J45.30    6. Class 2 severe obesity due to excess calories with serious comorbidity and body mass index (BMI) of 36.0 to 36.9 in adult (H) E66.01     Z68.36    7. Benign essential hypertension I10    8. Myalgia-total body  M79.10        Patient Instructions   1. DO YOUR NEBS  3 times a day : am and pm and once in between     2/.  Weight Loss Tips  1. Do not eat after 6 hrs before your expected bedtime  2. Have your heaviest meal for breakfast, a slightly lighter meal at lunch and a snack 6 hrs before bed  3. No sugar/calorie drinks except " milk ie no fruit juice, pop, alcohol.  4. Drink milk 30min before meals to decrease your hunger. Also it is excellent as part of your last meal of the day snack  5. Drink lots of water  6. Increase fiber in diet: all bran cereal, salads, popcorn etc  7. Have only one small serving of fruit a day about 1/2 cup (as this is high in sugar)  8. EXERCISE is the bottom line. Without it, you will gain weight even on a low calorie diet. Best if done 2-3X a day as can    Being overweight contributes to high blood pressure and high cholesterol, both of which cause heart attacks, strokes and kidney failure, prediabetes and diabetes, arthritis, and liver disease     3. Keep the injured area above the level of the heart as much as possible to help decrease the pain and  the healing time . Put pillows on either side while sleeping to keep the arm or leg elevated     4,. Walk for 15-20 min at least 4 times a day     5. See y our eye doctor for a diabetic eye chek       Koki Hudson MD  [facility]    Weight management plan: Discussed healthy diet and exercise guidelines    Time spent with the patient 44mins, more than 50% in counseling and coordinating care, Re above medical problems.  1) pt with DM out of control on top of 3 po meds   Needs to start insulin as A1C conts to rise--now 8.5   2) total body aches from: lack of exercise , obesity , DM uncontrolled , asthma uncontrolled , r/o low vit D     I  Explained the treatment and the reason for it    and that pt will have to e on insulin to control the DM   Koki Hudson MD

## 2018-12-14 NOTE — LETTER
December 19, 2018      Suze Devlin  [address] [place] [id number]        Dear ,    We are writing to inform you of your test results.    Your diabetes is dangerously out of control     Please come in to work at preventing heart attacks and strokes    Resulted Orders   Hemoglobin A1c   Result Value Ref Range    Hemoglobin A1C 8.5 (H) 0 - 5.6 %      Comment:      Normal <5.7% Prediabetes 5.7-6.4%  Diabetes 6.5% or higher - adopted from ADA   consensus guidelines.         If you have any questions or concerns, please call the clinic at the number listed above.       Sincerely,        Koki Hudson MD

## 2018-12-14 NOTE — PATIENT INSTRUCTIONS
1. DO YOUR NEBS  3 times a day : am and pm and once in between     2/.  Weight Loss Tips  1. Do not eat after 6 hrs before your expected bedtime  2. Have your heaviest meal for breakfast, a slightly lighter meal at lunch and a snack 6 hrs before bed  3. No sugar/calorie drinks except milk ie no fruit juice, pop, alcohol.  4. Drink milk 30min before meals to decrease your hunger. Also it is excellent as part of your last meal of the day snack  5. Drink lots of water  6. Increase fiber in diet: all bran cereal, salads, popcorn etc  7. Have only one small serving of fruit a day about 1/2 cup (as this is high in sugar)  8. EXERCISE is the bottom line. Without it, you will gain weight even on a low calorie diet. Best if done 2-3X a day as can    Being overweight contributes to high blood pressure and high cholesterol, both of which cause heart attacks, strokes and kidney failure, prediabetes and diabetes, arthritis, and liver disease     3. Keep the injured area above the level of the heart as much as possible to help decrease the pain and  the healing time . Put pillows on either side while sleeping to keep the arm or leg elevated     4,. Walk for 15-20 min at least 4 times a day     5. See y our eye doctor for a diabetic eye chek

## 2018-12-15 ASSESSMENT — ASTHMA QUESTIONNAIRES: ACT_TOTALSCORE: 18

## 2018-12-15 ASSESSMENT — ANXIETY QUESTIONNAIRES: GAD7 TOTAL SCORE: 7

## 2018-12-27 ENCOUNTER — TELEPHONE (OUTPATIENT)
Dept: FAMILY MEDICINE | Facility: CLINIC | Age: 73
End: 2018-12-27

## 2018-12-27 NOTE — TELEPHONE ENCOUNTER
Reason for Call: Request for an order or referral:    Order or referral being requested: Home care Skilled nursing 2 times a month for 3 months and 3 PRN    Date needed: as soon as possible    Has the patient been seen by the PCP for this problem? YES    Additional comments Please call [facility] at  Home care    Phone number Patient can be reached at:  Other phone number:  [phone]    Best Time:   anytime    Can we leave a detailed message on this number?  YES    Call taken on 12/27/2018 at 11:19 AM by LUIS F BLAS

## 2019-01-23 ENCOUNTER — TELEPHONE (OUTPATIENT)
Dept: FAMILY MEDICINE | Facility: CLINIC | Age: 74
End: 2019-01-23

## 2019-01-23 NOTE — TELEPHONE ENCOUNTER
Our goal is to have forms completed within 72 hours, however some forms may require a visit or additional information.    What clinic location was the form placed at [facility]     Who is the form from?   Where did the form come from? Faxed to clinic   The form was placed in the inbox of Zack Hudson Jr MD      Please fax to [phone]  Phone number: [phone]    Additional comments: [facility] - [phone]    Call take on 1/23/2019 at 3:42 PM by Megan Lord

## 2019-01-24 DIAGNOSIS — Z53.9 DIAGNOSIS NOT YET DEFINED: Primary | ICD-10-CM

## 2019-01-24 PROCEDURE — G0179 MD RECERTIFICATION HHA PT: HCPCS | Performed by: FAMILY MEDICINE

## 2019-01-28 ENCOUNTER — TRANSFERRED RECORDS (OUTPATIENT)
Dept: HEALTH INFORMATION MANAGEMENT | Facility: CLINIC | Age: 74
End: 2019-01-28

## 2019-02-14 ENCOUNTER — OFFICE VISIT (OUTPATIENT)
Dept: FAMILY MEDICINE | Facility: CLINIC | Age: 74
End: 2019-02-14
Payer: COMMERCIAL

## 2019-02-14 VITALS
DIASTOLIC BLOOD PRESSURE: 64 MMHG | BODY MASS INDEX: 35.73 KG/M2 | HEART RATE: 74 BPM | TEMPERATURE: 97 F | WEIGHT: 249 LBS | SYSTOLIC BLOOD PRESSURE: 124 MMHG | RESPIRATION RATE: 20 BRPM | OXYGEN SATURATION: 98 %

## 2019-02-14 DIAGNOSIS — E11.9 TYPE 2 DIABETES MELLITUS WITHOUT COMPLICATION, WITHOUT LONG-TERM CURRENT USE OF INSULIN (H): Primary | ICD-10-CM

## 2019-02-14 DIAGNOSIS — E78.2 MIXED HYPERLIPIDEMIA: ICD-10-CM

## 2019-02-14 DIAGNOSIS — J45.20 MILD INTERMITTENT ASTHMA WITHOUT COMPLICATION: ICD-10-CM

## 2019-02-14 PROCEDURE — 99214 OFFICE O/P EST MOD 30 MIN: CPT | Performed by: FAMILY MEDICINE

## 2019-02-14 NOTE — PROGRESS NOTES
SUBJECTIVE:   Suze Devlin is a 74 year old male who presents to clinic today for the following health issues:      Asthma Follow-Up problem #1    Was ACT completed today?    Yes    ACT Total Scores 12/14/2018   ACT TOTAL SCORE -   ASTHMA ER VISITS -   ASTHMA HOSPITALIZATIONS -   ACT TOTAL SCORE (Goal Greater than or Equal to 20) 18   In the past 12 months, how many times did you visit the emergency room for your asthma without being admitted to the hospital? 0   In the past 12 months, how many times were you hospitalized overnight because of your asthma? 0       Recent asthma triggers that patient is dealing with: cold air        Amount of exercise or physical activity: None    Problems taking medications regularly: No    Medication side effects: none    Diet: regular (no restrictions)    Problem #1 mild persistent asthma with exacerbation ACT score is 18 today    Subjective patient recent upper respiratory infection    Objective episodic wheezing cough and congestion    Assessment exacerbation of asthma    Plan on Singulair and DuoNeb's 4 times daily with as needed Ventolin inhaler 4 times daily            Problem #2 type 2 diabetes mellitus    This is been well controlled in the past     takes glipizide and Actos and metformin    DIET FORM FILLED IN     THREE MONTH GLUCOSE AVERAGE NEEDS TO DONE IN 6 WEEKS     WALK IN PLACE 15 MINUTES WITH EACH MEAL                  Problem #3 BPH symptoms and elevated PSA antigen test     On finasteride and tamsulisin    On tea        Problem #4 osteoarthritis involving both knees    Chronic low back pain    IMPROVED     BILATERAL KNEE PAIN PAINFUL     DISCUSSION RE INJECTION             Topic Date Due     EYE EXAM Q1 YEAR  06/26/2018     FIT Q1 YR  09/14/2018               .  Current Outpatient Medications   Medication Sig Dispense Refill     acetaminophen 500 MG CAPS Take 1 tablet by mouth 4 times daily 120 capsule 11     albuterol (PROAIR HFA/PROVENTIL HFA/VENTOLIN  HFA) 108 (90 Base) MCG/ACT inhaler Inhale 2 puffs into the lungs 4 times daily as needed 1 Inhaler 2     aspirin 81 MG EC tablet Take 1 tablet (81 mg) by mouth daily 90 tablet 3     atorvastatin (LIPITOR) 20 MG tablet Take 1 tablet (20 mg) by mouth daily 90 tablet 3     blood glucose monitoring (NO BRAND SPECIFIED) meter device kit Use to test blood sugar two times daily or as directed. 1 kit 0     blood glucose monitoring (NO BRAND SPECIFIED) test strip Use to test blood sugars two times daily or as directed 100 each 0     blood glucose monitoring (ONE TOUCH DELICA) lancets Use to test blood sugars  TWICE DAILY    or as directed. 3 Box prn     blood glucose monitoring (ONE TOUCH ULTRA 2) meter device kit Use to test blood sugars TWICE DAILY PRN  times daily or as directed. 1 kit 0     Cholecalciferol (VITAMIN D) 2000 UNITS tablet Take 2,000 Units by mouth daily 100 tablet 3     Cyanocobalamin (B-12) 1000 MCG TBCR Take 1,000 mcg by mouth daily 90 tablet 2     diclofenac (VOLTAREN) 1 % GEL Apply 4 grams to knees or 2 grams to hands four times daily using enclosed dosing card. 100 g 1     finasteride (PROSCAR) 5 MG tablet Take 1 tablet (5 mg) by mouth daily 90 tablet 3     fluticasone-salmeterol (ADVAIR) 100-50 MCG/DOSE diskus inhaler Inhale 1 puff into the lungs 2 times daily 1 Inhaler 5     glipiZIDE (GLUCOTROL XL) 10 MG 24 hr tablet Take 1 tablet (10 mg) by mouth daily 30 tablet 11     ipratropium - albuterol 0.5 mg/2.5 mg/3 mL (DUONEB) 0.5-2.5 (3) MG/3ML neb solution Take 1 vial (3 mLs) by nebulization every 6 hours as needed for shortness of breath / dyspnea or wheezing 90 vial 0     lidocaine (XYLOCAINE) 5 % ointment One application 4 x daily to affected areas lower back and knees if necessary 300 g 0     losartan (COZAAR) 25 MG tablet Take 1 tablet (25 mg) by mouth daily 90 tablet 3     metFORMIN modified (GLUMETZA) 1000 MG 24 hr tablet Take 1 tablet (1,000 mg) by mouth daily (with dinner) 90 tablet 1      montelukast (SINGULAIR) 10 MG tablet Take 1 tablet (10 mg) by mouth At Bedtime 30 tablet 11     omeprazole (PRILOSEC) 40 MG capsule Take 1 capsule (40 mg) by mouth daily Take 30-60 minutes before a meal. 91 capsule 3     order for DME Equipment being ordered: Nebulizer 1 each 0     order for DME Glucometer, brand as covered by insurance.-(ONE TOUCH DELICA) 1 each 0     pioglitazone (ACTOS) 15 MG tablet Take 1 tablet (15 mg) by mouth daily 90 tablet 1     polyethylene glycol (MIRALAX) powder Take 17 g (1 capful) by mouth daily as needed for constipation 119 g 0     senna-docusate (SENOKOT-S;PERICOLACE) 8.6-50 MG per tablet Take 2 tablets by mouth 2 times daily 120 tablet 11     sitagliptin (JANUVIA) 50 MG tablet Take 1 tablet (50 mg) by mouth daily 90 tablet 3     tadalafil (CIALIS) 5 MG tablet Take 1 tablet (5 mg) by mouth daily Never use with nitroglycerin, terazosin or doxazosin. 90 tablet 0     tamsulosin (FLOMAX) 0.4 MG capsule Take 1 capsule (0.4 mg) by mouth daily 90 capsule 3     triamcinolone (KENALOG) 0.1 % cream Apply sparingly to affected area three times daily as needed 80 g 11              No Known Allergies      Immunization History   Administered Date(s) Administered     Influenza (High Dose) 3 valent vaccine 10/03/2014, 10/13/2015, 11/03/2017, 10/25/2018     Pneumo Conj 13-V (2010&after) 10/25/2018     Pneumococcal 23 valent 10/17/2014     TD (ADULT, 7+) 04/04/2005, 02/03/2006, 08/08/2006     TDAP Vaccine (Adacel) 10/13/2015     Td (Adult), Adsorbed 02/03/2006     Varicella 04/04/2005, 02/03/2006               reports that he does not drink alcohol.          reports that he does not use drugs.        family history includes Unknown/Adopted in his father and mother.        indicated that the status of his mother is unknown. He indicated that the status of his father is unknown. He indicated that all of his four daughters are alive. He indicated that all of his five sons are alive.             has a  past surgical history that includes hernia repair, inguinal rt/lt.         reports that he does not engage in sexual activity.    .  Pediatric History   Patient Guardian Status     Not on file     Other Topics Concern     Parent/sibling w/ CABG, MI or angioplasty before 65F 55M? No   Social History Narrative     Not on file               reports that  has never smoked. he has never used smokeless tobacco.        Medical, social, surgical, and family histories reviewed.        Labs reviewed in EPIC  Patient Active Problem List   Diagnosis     Health Care Home     Cataract     Elevated prostate specific antigen (PSA)     Psychophysiological insomnia     Vitamin D deficiency     Hypertrophy of prostate without urinary obstruction     Memory loss     Anxiety     Gastroesophageal reflux disease without esophagitis     Primary localized osteoarthrosis, lower leg     Vitamin D insufficiency     Chronic idiopathic constipation     Vitamin B 12 deficiency     Chronic fatigue     GERD (gastroesophageal reflux disease)     Mild persistent asthma without complication     Diabetic neuropathy (H)     Lumbago     ACP (advance care planning)     Type 2 diabetes mellitus without complication, without long-term current use of insulin (H)     Mild intermittent asthma without complication     Screening examination for pulmonary tuberculosis     Benign essential hypertension     Mixed hyperlipidemia     Class 2 severe obesity due to excess calories with serious comorbidity and body mass index (BMI) of 36.0 to 36.9 in adult (H)     Type II or unspecified type diabetes mellitus with neurological manifestations, uncontrolled(250.62) (H)     Localized edema-minimal of LEs      Myalgia-total body        Past Surgical History:   Procedure Laterality Date     HERNIA REPAIR, INGUINAL RT/LT           Social History     Tobacco Use     Smoking status: Never Smoker     Smokeless tobacco: Never Used   Substance Use Topics     Alcohol use: No        Family History   Problem Relation Age of Onset     Unknown/Adopted Mother         medical history of family is unknown.     Unknown/Adopted Father              Current Outpatient Medications   Medication Sig Dispense Refill     acetaminophen 500 MG CAPS Take 1 tablet by mouth 4 times daily 120 capsule 11     albuterol (PROAIR HFA/PROVENTIL HFA/VENTOLIN HFA) 108 (90 Base) MCG/ACT inhaler Inhale 2 puffs into the lungs 4 times daily as needed 1 Inhaler 2     aspirin 81 MG EC tablet Take 1 tablet (81 mg) by mouth daily 90 tablet 3     atorvastatin (LIPITOR) 20 MG tablet Take 1 tablet (20 mg) by mouth daily 90 tablet 3     blood glucose monitoring (NO BRAND SPECIFIED) meter device kit Use to test blood sugar two times daily or as directed. 1 kit 0     blood glucose monitoring (NO BRAND SPECIFIED) test strip Use to test blood sugars two times daily or as directed 100 each 0     blood glucose monitoring (ONE TOUCH DELICA) lancets Use to test blood sugars  TWICE DAILY    or as directed. 3 Box prn     blood glucose monitoring (ONE TOUCH ULTRA 2) meter device kit Use to test blood sugars TWICE DAILY PRN  times daily or as directed. 1 kit 0     Cholecalciferol (VITAMIN D) 2000 UNITS tablet Take 2,000 Units by mouth daily 100 tablet 3     Cyanocobalamin (B-12) 1000 MCG TBCR Take 1,000 mcg by mouth daily 90 tablet 2     diclofenac (VOLTAREN) 1 % GEL Apply 4 grams to knees or 2 grams to hands four times daily using enclosed dosing card. 100 g 1     finasteride (PROSCAR) 5 MG tablet Take 1 tablet (5 mg) by mouth daily 90 tablet 3     fluticasone-salmeterol (ADVAIR) 100-50 MCG/DOSE diskus inhaler Inhale 1 puff into the lungs 2 times daily 1 Inhaler 5     glipiZIDE (GLUCOTROL XL) 10 MG 24 hr tablet Take 1 tablet (10 mg) by mouth daily 30 tablet 11     ipratropium - albuterol 0.5 mg/2.5 mg/3 mL (DUONEB) 0.5-2.5 (3) MG/3ML neb solution Take 1 vial (3 mLs) by nebulization every 6 hours as needed for shortness of breath / dyspnea or  wheezing 90 vial 0     lidocaine (XYLOCAINE) 5 % ointment One application 4 x daily to affected areas lower back and knees if necessary 300 g 0     losartan (COZAAR) 25 MG tablet Take 1 tablet (25 mg) by mouth daily 90 tablet 3     metFORMIN modified (GLUMETZA) 1000 MG 24 hr tablet Take 1 tablet (1,000 mg) by mouth daily (with dinner) 90 tablet 1     montelukast (SINGULAIR) 10 MG tablet Take 1 tablet (10 mg) by mouth At Bedtime 30 tablet 11     omeprazole (PRILOSEC) 40 MG capsule Take 1 capsule (40 mg) by mouth daily Take 30-60 minutes before a meal. 91 capsule 3     order for DME Equipment being ordered: Nebulizer 1 each 0     order for DME Glucometer, brand as covered by insurance.-(ONE TOUCH DELICA) 1 each 0     pioglitazone (ACTOS) 15 MG tablet Take 1 tablet (15 mg) by mouth daily 90 tablet 1     polyethylene glycol (MIRALAX) powder Take 17 g (1 capful) by mouth daily as needed for constipation 119 g 0     senna-docusate (SENOKOT-S;PERICOLACE) 8.6-50 MG per tablet Take 2 tablets by mouth 2 times daily 120 tablet 11     sitagliptin (JANUVIA) 50 MG tablet Take 1 tablet (50 mg) by mouth daily 90 tablet 3     tadalafil (CIALIS) 5 MG tablet Take 1 tablet (5 mg) by mouth daily Never use with nitroglycerin, terazosin or doxazosin. 90 tablet 0     tamsulosin (FLOMAX) 0.4 MG capsule Take 1 capsule (0.4 mg) by mouth daily 90 capsule 3     triamcinolone (KENALOG) 0.1 % cream Apply sparingly to affected area three times daily as needed 80 g 11           Recent Labs   Lab Test 12/14/18  1109 10/25/18  1254 09/12/18  1048 03/06/18  1608  09/12/17  1227  10/31/16  1459 03/11/16  1000   A1C 8.5*  --  7.8* 7.3*   < > 8.0*   < > 6.7* 9.7*   LDL  --  65  --   --   --  74  --  85 135*   HDL  --  52  --   --   --  44  --  47 55   TRIG  --  147  --   --   --  158*  --  146 122   ALT  --  26 28  --   --  30  --  28  --    CR  --   --  0.95  --   --  1.09  --  1.04 1.00   GFRESTIMATED  --   --  77  --   --  66  --  70 73   GFRESTBLACK   --   --  >90  --   --  80  --  85 89   POTASSIUM  --   --  3.8  --   --  4.1  --  4.4 5.3   TSH  --   --   --   --   --  3.75  --   --  4.36    < > = values in this interval not displayed.            BP Readings from Last 6 Encounters:   02/14/19 124/64   12/14/18 130/50   10/25/18 130/60   09/12/18 120/58   06/01/18 118/62   03/16/18 116/60           Wt Readings from Last 3 Encounters:   02/14/19 112.9 kg (249 lb)   12/14/18 116.6 kg (257 lb)   10/25/18 112.5 kg (248 lb)                 Positive symptoms or findings indicated by bold designation:         ROS: 10 point ROS neg other than the symptoms noted above in the HPI.except  has Health Care Home; Cataract; Elevated prostate specific antigen (PSA); Psychophysiological insomnia; Vitamin D deficiency; Hypertrophy of prostate without urinary obstruction; Memory loss; Anxiety; Gastroesophageal reflux disease without esophagitis; Primary localized osteoarthrosis, lower leg; Vitamin D insufficiency; Chronic idiopathic constipation; Vitamin B 12 deficiency; Chronic fatigue; GERD (gastroesophageal reflux disease); Mild persistent asthma without complication; Diabetic neuropathy (H); Lumbago; ACP (advance care planning); Type 2 diabetes mellitus without complication, without long-term current use of insulin (H); Mild intermittent asthma without complication; Screening examination for pulmonary tuberculosis; Benign essential hypertension; Mixed hyperlipidemia; Class 2 severe obesity due to excess calories with serious comorbidity and body mass index (BMI) of 36.0 to 36.9 in adult (H); Type II or unspecified type diabetes mellitus with neurological manifestations, uncontrolled(250.62) (H); Localized edema-minimal of LEs ; and Myalgia-total body  on their problem list.  Review Of Systems    Skin: negative    Eyes: negative    Ears/Nose/Throat: negative    Respiratory: No shortness of breath, dyspnea on exertion, cough, or hemoptysis    Asthma improved     Cardiovascular:  negative    Gastrointestinal: heartburn    Genitourinary: negative    Musculoskeletal: arthritis and joint pain     Low back pain    Neurologic: negative    Psychiatric: negative    Hematologic/Lymphatic/Immunologic: negative    Endocrine: negative                PE:  /64   Pulse 74   Temp 97  F (36.1  C) (Tympanic)   Resp 20   Wt 112.9 kg (249 lb)   SpO2 98%   BMI 35.73 kg/m   Body mass index is 35.73 kg/m .        Constitutional: general appearance, well nourished, well developed, in no acute distress, well developed, appears stated age, normal body habitus, moderate obesity        Eyes:; The patient has normal eyelids sclerae and conjunctivae :          Ears/Nose/Throat: external ear, overall: normal appearance; external nose, overall: benign appearance, normal moujth gums and lips           Neck: thyroid, overall: normal size, normal consistency, nontender,          Respiratory:  palpation of chest, overall: normal excursion,    Clear to percussion and auscultation     NO Tachypnea    NORMAL  Color          Cardiovascular:  Good color with no peripheral edema    Regular sinus rhythm without murmur.  Physiologic heart sounds   Heart is unelarged    .     Chest/Breast: normal shape           Abdominal exam,  Liver and spleen are  unenlarged        Tenderness    Scars              Urogenital; no renal, flank or bladder  tenderness;          Lymphatic: neck nodes,     Other nodes         Musculoskeletal:  Brief ortho exam normal except:           Integument: inspection of skin, no rash, lesions; and, palpation, no induration, no tenderness.          Neurologic mental status, overall: alert and oriented; gait, no ataxia, no unsteadiness; coordination, no tremors; cranial nerves, overall: normal motor, overall: normal bulk, tone.          Psychiatric: orientation/consciousness, overall: oriented to person, place and time; behavior/psychomotor activity, no tics, normal psychomotor activity; mood and affect,  overall: normal mood and affect; appearance, overall: well-groomed, good eye contact; speech, overall: normal quality, no aphasia and normal quality, quantity, intact.        Diagnostic Test Results:  Results for orders placed or performed in visit on 12/14/18   Hemoglobin A1c   Result Value Ref Range    Hemoglobin A1C 8.5 (H) 0 - 5.6 %           ICD-10-CM    1. Type 2 diabetes mellitus without complication, without long-term current use of insulin (H) E11.9    2. Mixed hyperlipidemia E78.2    3. Mild intermittent asthma without complication J45.20               .    Side effects benefits and risks thoroughly discussed. .he may come in early if unimproved or getting worse          Please drink 2 glasses of water prior to meals and walk 15-30 minutes after meals        I spent 25 minutes with patient discussing the following issues   The primary encounter diagnosis was Type 2 diabetes mellitus without complication, without long-term current use of insulin (H). Diagnoses of Mixed hyperlipidemia and Mild intermittent asthma without complication were also pertinent to this visit. over half of which involved counseling and coordination of care.      Patient Instructions     Problem #1 mild persistent asthma with exacerbation ACT score is 18 today    Subjective patient recent upper respiratory infection    Objective episodic wheezing cough and congestion    Assessment exacerbation of asthma    Plan on Singulair and DuoNeb's 4 times daily with as needed Ventolin inhaler 4 times daily            Problem #2 type 2 diabetes mellitus    This is been well controlled in the past     takes glipizide and Actos and metformin    DIET FORM FILLED IN     THREE MONTH GLUCOSE AVERAGE NEEDS TO DONE IN 6 WEEKS     WALK IN PLACE 15 MINUTES WITH EACH MEAL                  Problem #3 BPH symptoms and elevated PSA antigen test     On finasteride and tamsulisin    On tea        Problem #4 osteoarthritis involving both knees    Chronic low back  pain    IMPROVED     BILATERAL KNEE PAIN PAINFUL     DISCUSSION RE INJECTION                   ALL THE ABOVE PROBLEMS ARE STABLE AND MED CHANGES AS NOTED        Diet: Mediterranean diet ADA 1600-calorie diet 20 exchanges per day WALK IN PLACE 15 MINUTES WITH EACH MEAL          Exercise: Knee knee and walk in place and weight loss diet exercises Range of motion, balance, isometric, and strengthening exercises 30 repetitions twice daily of involved joints            .MAKI MORA MD 2/14/2019 9:46 AM  February 14, 2019

## 2019-02-15 ASSESSMENT — ASTHMA QUESTIONNAIRES: ACT_TOTALSCORE: 16

## 2019-02-22 ENCOUNTER — TELEPHONE (OUTPATIENT)
Dept: FAMILY MEDICINE | Facility: CLINIC | Age: 74
End: 2019-02-22

## 2019-02-22 NOTE — TELEPHONE ENCOUNTER
Reason for Call:  Home Health Care    Nury with [facility] called regarding (reason for call): verbal orders    Orders are needed for this patient.     Skilled Nursin x month for 1 month; 3 x month for 1 month; 3 prn visits    Pt Provider: BELÉN Hudson    Phone Number Homecare Nurse can be reached at: [phone]    Verbal approval given for re-certification.

## 2019-02-28 ENCOUNTER — PATIENT OUTREACH (OUTPATIENT)
Dept: GERIATRIC MEDICINE | Facility: CLINIC | Age: 74
End: 2019-02-28

## 2019-02-28 NOTE — PROGRESS NOTES
[facility] Care Coordination Contact      [facility] Six-Month Telephone Assessment    6 month telephone assessment completed on 02/28/2019.    ER visits: No  Hospitalizations: No  TCU stays: No  Significant health status changes: n/a  Falls/Injuries: No  ADL/IADL changes: No  Changes in services: No    Caregiver Assessment follow up:  n/a    Goals: See POC in chart for goal progress documentation.     Will see member in 6 months for an annual health risk assessment.   Encouraged member to call CC with any questions or concerns in the meantime.     Digna Chen RN BSN PHN  [facility]   RN Care Coordinator   Phone:   [phone]  Fax:       [phone]

## 2019-03-08 ENCOUNTER — TELEPHONE (OUTPATIENT)
Dept: FAMILY MEDICINE | Facility: CLINIC | Age: 74
End: 2019-03-08

## 2019-03-08 DIAGNOSIS — M17.10 PRIMARY LOCALIZED OSTEOARTHROSIS OF LOWER LEG, UNSPECIFIED LATERALITY: Primary | ICD-10-CM

## 2019-03-08 RX ORDER — OMEGA-3 FATTY ACIDS/FISH OIL 300-1000MG
400 CAPSULE ORAL EVERY 8 HOURS PRN
Qty: 120 CAPSULE | Refills: 0 | Status: SHIPPED | OUTPATIENT
Start: 2019-03-08 | End: 2019-07-22

## 2019-03-08 NOTE — TELEPHONE ENCOUNTER
Pt was called using language line. States he takes ibuprofen for body aches at night. Reports PCP had send this rx in the past. He prefers ibuprofen over tylenol. Writer is unable to find Rx on current medication list. Routing message to providers for approval.    Pt takes ibuprofen 200 mg two tablets HS.

## 2019-03-08 NOTE — PATIENT INSTRUCTIONS
Please note that this can cause hypertension strokes and heart attacks  I want to get a give him one prescription he needs to come in for further discussions  MAKI MORA JR., MD

## 2019-03-08 NOTE — TELEPHONE ENCOUNTER
Reason for Call:  Medication or medication refill:    Do you use a [facility]?  Name of the pharmacy and phone number for the current request:  [facility] pharmacy    Name of the medication requested: ibuprofin    Other request: not on med list     Can we leave a detailed message on this number? YES    Phone number patient can be reached at: Home number on file [phone] (home)    Best Time: any    Call taken on 3/8/2019 at 2:33 PM by KARIS PETERS

## 2019-03-19 ENCOUNTER — TELEPHONE (OUTPATIENT)
Dept: FAMILY MEDICINE | Facility: CLINIC | Age: 74
End: 2019-03-19

## 2019-03-19 DIAGNOSIS — Z53.9 DIAGNOSIS NOT YET DEFINED: Primary | ICD-10-CM

## 2019-03-19 PROCEDURE — G0179 MD RECERTIFICATION HHA PT: HCPCS | Performed by: FAMILY MEDICINE

## 2019-03-19 NOTE — TELEPHONE ENCOUNTER
Our goal is to have forms completed within 72 hours, however some forms may require a visit or additional information.    What clinic location was the form placed at [facility] or [place]     Who is the form from?   Where did the form come from? Faxed to clinic   The form was placed in the inbox of Zack Hudson Jr MD      Please fax to [phone]  Phone number: [phone]    Additional comments:  Home Care - [facility] 2/26/2019 - 4/26/2019    Call take on 3/19/2019 at 4:03 PM by Megan Lord

## 2019-03-27 DIAGNOSIS — E55.9 VITAMIN D INSUFFICIENCY: ICD-10-CM

## 2019-03-27 RX ORDER — CHOLECALCIFEROL (VITAMIN D3) 50 MCG
2000 TABLET ORAL DAILY
Qty: 90 TABLET | Refills: 3 | Status: SHIPPED | OUTPATIENT
Start: 2019-03-27 | End: 2019-05-09

## 2019-03-27 NOTE — TELEPHONE ENCOUNTER
"Requested Prescriptions   Pending Prescriptions Disp Refills     vitamin D3 (CHOLECALCIFEROL) 2000 units tablet  Last Written Prescription Date:  2/26/2018  Last Fill Quantity: 100 tablet,  # refills: 3   Last office visit: 2/14/2019 with prescribing provider:  BELÉN Hudson   Future Office Visit:   Next 5 appointments (look out 90 days)    Apr 04, 2019  8:30 AM CDT  [facility] [address] [address]  [phone]          100 tablet 3     Sig: Take 1 tablet by mouth daily    Vitamin Supplements (Adult) Protocol Passed - 3/27/2019 10:52 AM       Passed - High dose Vitamin D not ordered       Passed - Recent (12 mo) or future (30 days) visit within the authorizing provider's specialty    Patient had office visit in the last 12 months or has a visit in the next 30 days with authorizing provider or within the authorizing provider's specialty.  See \"Patient Info\" tab in inbasket, or \"Choose Columns\" in Meds & Orders section of the refill encounter.             Passed - Medication is active on med list           "

## 2019-03-28 DIAGNOSIS — E11.21 TYPE 2 DIABETES MELLITUS WITH DIABETIC NEPHROPATHY, WITHOUT LONG-TERM CURRENT USE OF INSULIN (H): Chronic | ICD-10-CM

## 2019-03-28 DIAGNOSIS — E53.8 VITAMIN B 12 DEFICIENCY: ICD-10-CM

## 2019-03-28 RX ORDER — GLIPIZIDE 10 MG/1
10 TABLET, FILM COATED, EXTENDED RELEASE ORAL DAILY
Qty: 30 TABLET | Refills: 0 | Status: SHIPPED | OUTPATIENT
Start: 2019-03-28 | End: 2019-07-22

## 2019-03-28 NOTE — TELEPHONE ENCOUNTER
Reason for Call:  Medication or medication refill:    Do you use a [facility]?  Name of the pharmacy and phone number for the current request:  [facility] - [place], [place] - [address] [place] [address] AVE    Name of the medication requested: Cyanocobalamin (B-12) 1000 MCG TBCR,  glipiZIDE (GLUCOTROL XL) 10 MG 24 hr tablet    Other request: Please send to pharmacy. Pharmacy called stating they sent 3 medications for refill requests yesterday and only got one back. Only seen the 1 refill request. Please call if any questions    Can we leave a detailed message on this number? YES    Phone number patient can be reached at: Other phone number:  [phone]    Best Time: any    Call taken on 3/28/2019 at 3:11 PM by YANETH LAY

## 2019-03-28 NOTE — TELEPHONE ENCOUNTER
"Medication is being filled for 1 time refill only due to:  Future labs ordered A1C.      Last Written Prescription Date:  2/26/2018  Last Fill Quantity: 90,  # refills: 2  Last office visit: 2/14/2019 with prescribing provider:  YUVAL Hudson   Future Office Visit:   Next 5 appointments (look out 90 days)    Apr 04, 2019  8:30 AM CDT  Office Visit with Zack Hudson MD  [facility] ([facility] [address] [address] [phone]         Requested Prescriptions   Pending Prescriptions Disp Refills     Cyanocobalamin (B-12) 1000 MCG TBCR 90 tablet 2     Sig: Take 1,000 mcg by mouth daily    Vitamin Supplements (Adult) Protocol Passed - 3/28/2019  3:13 PM       Passed - High dose Vitamin D not ordered       Passed - Recent (12 mo) or future (30 days) visit within the authorizing provider's specialty    Patient had office visit in the last 12 months or has a visit in the next 30 days with authorizing provider or within the authorizing provider's specialty.  See \"Patient Info\" tab in inbasket, or \"Choose Columns\" in Meds & Orders section of the refill encounter.             Passed - Medication is active on med list        glipiZIDE (GLUCOTROL XL) 10 MG 24 hr tablet 30 tablet 11     Sig: Take 1 tablet (10 mg) by mouth daily    Sulfonylurea Agents Failed - 3/28/2019  3:13 PM       Failed - Patient has documented A1c within the specified period of time.    If HgbA1C is 8 or greater, it needs to be on file within the past 3 months.  If less than 8, must be on file within the past 6 months.     Recent Labs   Lab Test 12/14/18  1109   A1C 8.5*            Passed - Blood pressure less than 140/90 in past 6 months    BP Readings from Last 3 Encounters:   02/14/19 124/64   12/14/18 130/50   10/25/18 130/60                Passed - Patient has documented LDL within the past 12 mos.    Recent Labs   Lab Test " "10/25/18  1254   LDL 65            Passed - Patient has had a Microalbumin in the past 12 mos.    Recent Labs   Lab Test 09/12/18  1614  09/20/12  1505   [id number]  --   --  16.4   [id number]  --   --  9.4   MICROL 50   < >  --    UMALCR 30.00*   < >  --     < > = values in this interval not displayed.            Passed - Medication is active on med list       Passed - Patient is age 18 or older       Passed - Patient has a recent creatinine (normal) within the past 12 mos.    Recent Labs   Lab Test 09/12/18  1048   CR 0.95            Passed - Recent (6 mo) or future (30 days) visit within the authorizing provider's specialty    Patient had office visit in the last 6 months or has a visit in the next 30 days with authorizing provider or within the authorizing provider's specialty.  See \"Patient Info\" tab in inbasket, or \"Choose Columns\" in Meds & Orders section of the refill encounter.              "

## 2019-04-24 ENCOUNTER — TELEPHONE (OUTPATIENT)
Dept: FAMILY MEDICINE | Facility: CLINIC | Age: 74
End: 2019-04-24

## 2019-04-29 ENCOUNTER — TELEPHONE (OUTPATIENT)
Dept: FAMILY MEDICINE | Facility: CLINIC | Age: 74
End: 2019-04-29

## 2019-04-29 NOTE — TELEPHONE ENCOUNTER
Our goal is to have forms completed within 72 hours, however some forms may require a visit or additional information.    What clinic location was the form placed at [facility] or [place].?     Who is the form from?   Where did the form come from? Faxed to clinic   The form was placed in the inbox of Zack Hudson Jr MD      Please fax to [phone]  Phone number: [phone]    Additional comments:  Home Care - [facility] 4/27/2019 - 6/25/2019    Call take on 4/29/2019 at 4:08 PM by Megan Lord

## 2019-04-30 DIAGNOSIS — Z53.9 DIAGNOSIS NOT YET DEFINED: Primary | ICD-10-CM

## 2019-04-30 PROCEDURE — 99207 C MD RECERTIFICATION HHA PT: CPT | Performed by: FAMILY MEDICINE

## 2019-05-09 DIAGNOSIS — E08.49 OTHER DIABETIC NEUROLOGICAL COMPLICATION ASSOCIATED WITH DIABETES MELLITUS DUE TO UNDERLYING CONDITION (H): ICD-10-CM

## 2019-05-09 DIAGNOSIS — L28.0 NEURODERMATITIS, LOCALIZED: ICD-10-CM

## 2019-05-09 DIAGNOSIS — E11.21 TYPE 2 DIABETES MELLITUS WITH DIABETIC NEPHROPATHY, WITHOUT LONG-TERM CURRENT USE OF INSULIN (H): Chronic | ICD-10-CM

## 2019-05-09 DIAGNOSIS — E11.21 CONTROLLED TYPE 2 DIABETES MELLITUS WITH MICROALBUMINURIC DIABETIC NEPHROPATHY (H): ICD-10-CM

## 2019-05-09 DIAGNOSIS — E55.9 VITAMIN D INSUFFICIENCY: ICD-10-CM

## 2019-05-09 RX ORDER — CHOLECALCIFEROL (VITAMIN D3) 50 MCG
2000 TABLET ORAL DAILY
Qty: 90 TABLET | Refills: 3 | Status: SHIPPED | OUTPATIENT
Start: 2019-05-09 | End: 2020-02-10

## 2019-05-09 RX ORDER — TRIAMCINOLONE ACETONIDE 1 MG/G
CREAM TOPICAL
Qty: 80 G | Refills: 11 | Status: SHIPPED | OUTPATIENT
Start: 2019-05-09 | End: 2019-07-22

## 2019-05-09 RX ORDER — PIOGLITAZONEHYDROCHLORIDE 15 MG/1
15 TABLET ORAL DAILY
Qty: 90 TABLET | Refills: 1 | Status: SHIPPED | OUTPATIENT
Start: 2019-05-09 | End: 2020-02-10

## 2019-07-08 ENCOUNTER — OFFICE VISIT (OUTPATIENT)
Dept: FAMILY MEDICINE | Facility: CLINIC | Age: 74
End: 2019-07-08
Payer: COMMERCIAL

## 2019-07-08 VITALS
WEIGHT: 250 LBS | TEMPERATURE: 97.5 F | HEART RATE: 90 BPM | OXYGEN SATURATION: 98 % | RESPIRATION RATE: 20 BRPM | BODY MASS INDEX: 35.79 KG/M2 | DIASTOLIC BLOOD PRESSURE: 60 MMHG | SYSTOLIC BLOOD PRESSURE: 120 MMHG | HEIGHT: 70 IN

## 2019-07-08 DIAGNOSIS — K21.9 GASTROESOPHAGEAL REFLUX DISEASE WITHOUT ESOPHAGITIS: Chronic | ICD-10-CM

## 2019-07-08 DIAGNOSIS — G62.9 PERIPHERAL POLYNEUROPATHY: ICD-10-CM

## 2019-07-08 DIAGNOSIS — J45.30 MILD PERSISTENT ASTHMA WITHOUT COMPLICATION: Chronic | ICD-10-CM

## 2019-07-08 DIAGNOSIS — E11.9 TYPE 2 DIABETES MELLITUS WITHOUT COMPLICATION, WITHOUT LONG-TERM CURRENT USE OF INSULIN (H): Primary | Chronic | ICD-10-CM

## 2019-07-08 LAB — HBA1C MFR BLD: 7.6 % (ref 0–5.6)

## 2019-07-08 PROCEDURE — 83036 HEMOGLOBIN GLYCOSYLATED A1C: CPT | Performed by: INTERNAL MEDICINE

## 2019-07-08 PROCEDURE — 80053 COMPREHEN METABOLIC PANEL: CPT | Performed by: INTERNAL MEDICINE

## 2019-07-08 PROCEDURE — 99214 OFFICE O/P EST MOD 30 MIN: CPT | Performed by: INTERNAL MEDICINE

## 2019-07-08 PROCEDURE — 36415 COLL VENOUS BLD VENIPUNCTURE: CPT | Performed by: INTERNAL MEDICINE

## 2019-07-08 PROCEDURE — 82607 VITAMIN B-12: CPT | Performed by: INTERNAL MEDICINE

## 2019-07-08 RX ORDER — GABAPENTIN 300 MG/1
300 CAPSULE ORAL AT BEDTIME
Qty: 30 CAPSULE | Refills: 5 | Status: SHIPPED | OUTPATIENT
Start: 2019-07-08 | End: 2020-02-10

## 2019-07-08 ASSESSMENT — MIFFLIN-ST. JEOR: SCORE: 1875.24

## 2019-07-08 NOTE — PATIENT INSTRUCTIONS
Please bring in all of your pill bottles next time, and every time.           I have sent an Rx for gabapentin to take at bedtime.         This will help relieve the burning and pain in your feet at night.

## 2019-07-08 NOTE — PROGRESS NOTES
Subjective     Suze Devlin is a 74 year old male who presents to clinic today for the following health issues:    HPI   Diabetes Follow-up      How often are you checking your blood sugar? One time daily    What time of day are you checking your blood sugars (select all that apply)?  Before meals    Have you had any blood sugars above 200?  Yes     Have you had any blood sugars below 70?  Yes     What symptoms do you notice when your blood sugar is low?  Weak    What concerns do you have today about your diabetes? Blood sugar is often over 200     Do you have any of these symptoms? (Select all that apply)  Numbness in feet and Burning in feet     Have you had a diabetic eye exam in the last 12 months? Yes- Date of last eye exam: unknown    Diabetes Management Resources    Hyperlipidemia Follow-Up      Are you having any of the following symptoms? (Select all that apply)  No complaints of shortness of breath, chest pain or pressure.  No increased sweating or nausea with activity.  No left-sided neck or arm pain.  No complaints of pain in calves when walking 1-2 blocks.    Are you regularly taking any medication or supplement to lower your cholesterol?   Yes- Atorvastatin    Are you having muscle aches or other side effects that you think could be caused by your cholesterol lowering medication?  No    Hypertension Follow-up      Do you check your blood pressure regularly outside of the clinic? Yes     Are you following a low salt diet? Yes    Are your blood pressures ever more than 140 on the top number (systolic) OR more   than 90 on the bottom number (diastolic), for example 140/90? No    BP Readings from Last 2 Encounters:   07/08/19 120/60   02/14/19 124/64     Hemoglobin A1C (%)   Date Value   12/14/2018 8.5 (H)   09/12/2018 7.8 (H)     LDL Cholesterol Calculated (mg/dL)   Date Value   10/25/2018 65   09/12/2017 74       Amount of exercise or physical activity: None    Problems taking medications  "regularly: No    Medication side effects: none    Diet: low salt, low fat/cholesterol and diabetic     here with an .              C/o burning discomfort both feet.          Also at night.               Wants an Rx for \"diabetic shoes\" to take to a [facility] (orthotics lab?).               States that in the past when he wore these shoes, his foot sx were much reduced.              > 15 yr hx of diabetes.               States taking all meds; however, he does not know these by name.                 Takes PPI and metformin; needs a B12 level.             Current Outpatient Medications   Medication Sig Dispense Refill     acetaminophen 500 MG CAPS Take 1 tablet by mouth 4 times daily 120 capsule 11     albuterol (PROAIR HFA/PROVENTIL HFA/VENTOLIN HFA) 108 (90 Base) MCG/ACT inhaler Inhale 2 puffs into the lungs 4 times daily as needed 1 Inhaler 2     aspirin 81 MG EC tablet Take 1 tablet (81 mg) by mouth daily 90 tablet 3     blood glucose monitoring (NO BRAND SPECIFIED) test strip Use to test blood sugars two times daily or as directed 100 each 0     blood glucose monitoring (ONE TOUCH DELICA) lancets Use to test blood sugars  TWICE DAILY    or as directed. 3 Box prn     blood glucose monitoring (ONE TOUCH ULTRA 2) meter device kit Use to test blood sugars TWICE DAILY PRN  times daily or as directed. 1 kit 0     Cyanocobalamin (B-12) 1000 MCG TBCR Take 1,000 mcg by mouth daily 90 tablet 2     diclofenac (VOLTAREN) 1 % GEL Apply 4 grams to knees or 2 grams to hands four times daily using enclosed dosing card. 100 g 1     finasteride (PROSCAR) 5 MG tablet Take 1 tablet (5 mg) by mouth daily 90 tablet 3     fluticasone-salmeterol (ADVAIR) 100-50 MCG/DOSE diskus inhaler Inhale 1 puff into the lungs 2 times daily 1 Inhaler 5     glipiZIDE (GLUCOTROL XL) 10 MG 24 hr tablet Take 1 tablet (10 mg) by mouth daily 30 tablet 0     ibuprofen (ADVIL/MOTRIN) 200 MG capsule Take 2 capsules (400 mg) by mouth every 8 hours " as needed for fever 120 capsule 0     lidocaine (XYLOCAINE) 5 % ointment One application 4 x daily to affected areas lower back and knees if necessary 300 g 0     losartan (COZAAR) 25 MG tablet Take 1 tablet (25 mg) by mouth daily 90 tablet 3     metFORMIN modified (GLUMETZA) 1000 MG 24 hr tablet Take 1 tablet (1,000 mg) by mouth daily (with dinner) 90 tablet 1     montelukast (SINGULAIR) 10 MG tablet Take 1 tablet (10 mg) by mouth At Bedtime 30 tablet 11     omeprazole (PRILOSEC) 40 MG capsule Take 1 capsule (40 mg) by mouth daily Take 30-60 minutes before a meal. 91 capsule 3     order for DME Equipment being ordered: Nebulizer 1 each 0     order for DME Glucometer, brand as covered by insurance.-(ONE TOUCH DELICA) 1 each 0     pioglitazone (ACTOS) 15 MG tablet Take 1 tablet (15 mg) by mouth daily 90 tablet 1     polyethylene glycol (MIRALAX) powder Take 17 g (1 capful) by mouth daily as needed for constipation 119 g 0     senna-docusate (SENOKOT-S;PERICOLACE) 8.6-50 MG per tablet Take 2 tablets by mouth 2 times daily 120 tablet 11     sitagliptin (JANUVIA) 50 MG tablet Take 1 tablet (50 mg) by mouth daily 90 tablet 3     tamsulosin (FLOMAX) 0.4 MG capsule Take 1 capsule (0.4 mg) by mouth daily 90 capsule 3     vitamin D3 (CHOLECALCIFEROL) 2000 units tablet Take 1 tablet by mouth daily 90 tablet 3     atorvastatin (LIPITOR) 20 MG tablet Take 1 tablet (20 mg) by mouth daily 90 tablet 3     ipratropium - albuterol 0.5 mg/2.5 mg/3 mL (DUONEB) 0.5-2.5 (3) MG/3ML neb solution Take 1 vial (3 mLs) by nebulization every 6 hours as needed for shortness of breath / dyspnea or wheezing (Patient not taking: Reported on 7/8/2019) 90 vial 0     tadalafil (CIALIS) 5 MG tablet Take 1 tablet (5 mg) by mouth daily Never use with nitroglycerin, terazosin or doxazosin. (Patient not taking: Reported on 7/8/2019) 90 tablet 0     triamcinolone (KENALOG) 0.1 % external cream Apply sparingly to affected area three times daily as needed  "(Patient not taking: Reported on 7/8/2019) 80 g 11     BP Readings from Last 3 Encounters:   07/08/19 120/60   02/14/19 124/64   12/14/18 130/50    Wt Readings from Last 3 Encounters:   07/08/19 113.4 kg (250 lb)   02/14/19 112.9 kg (249 lb)   12/14/18 116.6 kg (257 lb)                      Reviewed and updated as needed this visit by Provider         Review of Systems   ROS COMP: CONSTITUTIONAL: NEGATIVE for fever, chills, change in weight  ENT/MOUTH: NEGATIVE for ear, mouth and throat problems  RESP:POSITIVE for Hx asthma and occasional PACHECO and NEGATIVE for cough-productive and hemoptysis  CV: NEGATIVE for chest pain, palpitations or peripheral edema      Objective    /60 (BP Location: Left arm, Patient Position: Chair, Cuff Size: Adult Large)   Pulse 90   Temp 97.5  F (36.4  C)   Resp 20   Ht 1.778 m (5' 10\")   Wt 113.4 kg (250 lb)   SpO2 98%   BMI 35.87 kg/m    Body mass index is 35.87 kg/m .  Physical Exam   GENERAL: alert, no distress and over weight  NECK: no adenopathy, no asymmetry, masses, or scars and thyroid normal to palpation  RESP: no rales  and no rhonchi  CV: regular rates and rhythm, normal S1 S2, no S3 or S4, no murmur, click or rub, peripheral pulses strong and trace+ bilateral lower extremity pitting edema     Diabetic foot exam: normal DP and PT pulses and no trophic changes or ulcerative lesions; decreased light touch sensation    Diagnostic Test Results:  PENDING        Assessment & Plan     Suze was seen today for lipids, diabetes and hypertension.    Diagnoses and all orders for this visit:    Type 2 diabetes mellitus without complication, without long-term current use of insulin (H)  -     Comprehensive metabolic panel (BMP + Alb, Alk Phos, ALT, AST, Total. Bili, TP)  -     Hemoglobin A1c  -     Vitamin B12    Peripheral polyneuropathy  -     gabapentin (NEURONTIN) 300 MG capsule; Take 1 capsule (300 mg) by mouth At Bedtime    Mild persistent asthma without " "complication    Gastroesophageal reflux disease without esophagitis  -     Vitamin B12         BMI:   Estimated body mass index is 35.87 kg/m  as calculated from the following:    Height as of this encounter: 1.778 m (5' 10\").    Weight as of this encounter: 113.4 kg (250 lb).   Weight management plan: Discussed healthy diet and exercise guidelines        Summary and implications:  We reviewed multiple issues.           We reviewed all of the issues on the diagnoses list.     Check labs and adjust medications as indicated.                    He has no  at home to read his lab results to him.   Therefore, he will make a f/u appt with [facility] or me.                                 He agrees to bring in his pill bottles next time.            Rx written for \" diabetic shoes\".  Patient Instructions           Please bring in all of your pill bottles next time, and every time.           I have sent an Rx for gabapentin to take at bedtime.         This will help relieve the burning and pain in your feet at night.                        Return in about 2 weeks (around 7/22/2019) for lab results.    Jayjay Connor MD  [facility]    Results for orders placed or performed in visit on 07/08/19   Comprehensive metabolic panel (BMP + Alb, Alk Phos, ALT, AST, Total. Bili, TP)   Result Value Ref Range    Sodium 139 133 - 144 mmol/L    Potassium 4.2 3.4 - 5.3 mmol/L    Chloride 107 94 - 109 mmol/L    Carbon Dioxide 22 20 - 32 mmol/L    Anion Gap 10 3 - 14 mmol/L    Glucose 287 (H) 70 - 99 mg/dL    Urea Nitrogen 12 7 - 30 mg/dL    Creatinine 1.04 0.66 - 1.25 mg/dL    GFR Estimate 70 >60 mL/min/[1.73_m2]    GFR Estimate If Black 81 >60 mL/min/[1.73_m2]    Calcium 9.6 8.5 - 10.1 mg/dL    Bilirubin Total 0.4 0.2 - 1.3 mg/dL    Albumin 3.4 3.4 - 5.0 g/dL    Protein Total 7.2 6.8 - 8.8 g/dL    Alkaline Phosphatase 92 40 - 150 U/L    ALT 25 0 - 70 U/L    AST 14 0 - 45 U/L   Hemoglobin A1c   Result " Value Ref Range    Hemoglobin A1C 7.6 (H) 0 - 5.6 %   Vitamin B12   Result Value Ref Range    Vitamin B12 525 193 - 986 pg/mL     Note:         A1C down from 8.5.                   He has an appt for f/u on July 22.

## 2019-07-08 NOTE — LETTER
My Asthma Action Plan  Name: Suze Devlin   YOB: 1945  Date: 7/8/2019   My doctor: Jayjay Connor MD   My clinic: [facility]        My Control Medicine: { :705545}  My Rescue Medicine: { :055884}  {AAP include Oral Steroid:843899} My Asthma Severity: { :877382}  Avoid your asthma triggers: { :824383}  cold air     {Is patient a child or adult?:237820}       GREEN ZONE   Good Control    I feel good    No cough or wheeze    Can work, sleep and play without asthma symptoms       Take your asthma control medicine every day.     1. If exercise triggers your asthma, take your rescue medication    15 minutes before exercise or sports, and    During exercise if you have asthma symptoms  2. Spacer to use with inhaler: If you have a spacer, make sure to use it with your inhaler             YELLOW ZONE Getting Worse  I have ANY of these:    I do not feel good    Cough or wheeze    Chest feels tight    Wake up at night   1. Keep taking your Green Zone medications  2. Start taking your rescue medicine:    every 20 minutes for up to 1 hour. Then every 4 hours for 24-48 hours.  3. If you stay in the Yellow Zone for more than 12-24 hours, contact your doctor.  4. If you do not return to the Green Zone in 12-24 hours or you get worse, start taking your oral steroid medicine if prescribed by your provider.           RED ZONE Medical Alert - Get Help  I have ANY of these:    I feel awful    Medicine is not helping    Breathing getting harder    Trouble walking or talking    Nose opens wide to breathe       1. Take your rescue medicine NOW  2. If your provider has prescribed an oral steroid medicine, start taking it NOW  3. Call your doctor NOW  4. If you are still in the Red Zone after 20 minutes and you have not reached your doctor:    Take your rescue medicine again and    Call 911 or go to the emergency room right away    See your regular doctor within 2 weeks of an  Emergency Room or Urgent Care visit for follow-up treatment.          Annual Reminders:  Meet with Asthma Educator,  Flu Shot in the Fall, consider Pneumonia Vaccination for patients with asthma (aged 19 and older).    Pharmacy: [facility] - [place], [place] - [facility]                      Asthma Triggers  How To Control Things That Make Your Asthma Worse    Triggers are things that make your asthma worse.  Look at the list below to help you find your triggers and what you can do about them.  You can help prevent asthma flare-ups by staying away from your triggers.      Trigger                                                          What you can do   Cigarette Smoke  Tobacco smoke can make asthma worse. Do not allow smoking in your home, car or around you.  Be sure no one smokes at a child s day care or school.  If you smoke, ask your health care provider for ways to help you quit.  Ask family members to quit too.  Ask your health care provider for a referral to Quit Plan to help you quit smoking, or call 6-012-003-PLAN.     Colds, Flu, Bronchitis  These are common triggers of asthma. Wash your hands often.  Don t touch your eyes, nose or mouth.  Get a flu shot every year.     Dust Mites  These are tiny bugs that live in cloth or carpet. They are too small to see. Wash sheets and blankets in hot water every week.   Encase pillows and mattress in dust mite proof covers.  Avoid having carpet if you can. If you have carpet, vacuum weekly.   Use a dust mask and HEPA vacuum.   Pollen and Outdoor Mold  Some people are allergic to trees, grass, or weed pollen, or molds. Try to keep your windows closed.  Limit time out doors when pollen count is high.   Ask you health care provider about taking medicine during allergy season.     Animal Dander  Some people are allergic to skin flakes, urine or saliva from pets with fur or feathers. Keep pets with fur or feathers out of your home.    If you can t  keep the pet outdoors, then keep the pet out of your bedroom.  Keep the bedroom door closed.  Keep pets off cloth furniture and away from stuffed toys.     Mice, Rats, and Cockroaches  Some people are allergic to the waste from these pests.   Cover food and garbage.  Clean up spills and food crumbs.  Store grease in the refrigerator.   Keep food out of the bedroom.   Indoor Mold  This can be a trigger if your home has high moisture. Fix leaking faucets, pipes, or other sources of water.   Clean moldy surfaces.  Dehumidify basement if it is damp and smelly.   Smoke, Strong Odors, and Sprays  These can reduce air quality. Stay away from strong odors and sprays, such as perfume, powder, hair spray, paints, smoke incense, paint, cleaning products, candles and new carpet.   Exercise or Sports  Some people with asthma have this trigger. Be active!  Ask your doctor about taking medicine before sports or exercise to prevent symptoms.    Warm up for 5-10 minutes before and after sports or exercise.     Other Triggers of Asthma  Cold air:  Cover your nose and mouth with a scarf.  Sometimes laughing or crying can be a trigger.  Some medicines and food can trigger asthma.

## 2019-07-08 NOTE — LETTER
July 8, 2019      Riteshfareed ESPAÑA Suemk  [address]   [facility] [address]      To Whom It May Concern                 Please supply this patient with diabetic type shoes, and inserts as needed.        He has type 2 diabetes, and peripheral neuropathy.                      If you have any questions or concerns, please call the clinic at the number listed above.       Sincerely,        Jayjay Connor MD

## 2019-07-09 LAB
ALBUMIN SERPL-MCNC: 3.4 G/DL (ref 3.4–5)
ALP SERPL-CCNC: 92 U/L (ref 40–150)
ALT SERPL W P-5'-P-CCNC: 25 U/L (ref 0–70)
ANION GAP SERPL CALCULATED.3IONS-SCNC: 10 MMOL/L (ref 3–14)
AST SERPL W P-5'-P-CCNC: 14 U/L (ref 0–45)
BILIRUB SERPL-MCNC: 0.4 MG/DL (ref 0.2–1.3)
BUN SERPL-MCNC: 12 MG/DL (ref 7–30)
CALCIUM SERPL-MCNC: 9.6 MG/DL (ref 8.5–10.1)
CHLORIDE SERPL-SCNC: 107 MMOL/L (ref 94–109)
CO2 SERPL-SCNC: 22 MMOL/L (ref 20–32)
CREAT SERPL-MCNC: 1.04 MG/DL (ref 0.66–1.25)
GFR SERPL CREATININE-BSD FRML MDRD: 70 ML/MIN/{1.73_M2}
GLUCOSE SERPL-MCNC: 287 MG/DL (ref 70–99)
POTASSIUM SERPL-SCNC: 4.2 MMOL/L (ref 3.4–5.3)
PROT SERPL-MCNC: 7.2 G/DL (ref 6.8–8.8)
SODIUM SERPL-SCNC: 139 MMOL/L (ref 133–144)
VIT B12 SERPL-MCNC: 525 PG/ML (ref 193–986)

## 2019-07-09 ASSESSMENT — ASTHMA QUESTIONNAIRES: ACT_TOTALSCORE: 20

## 2019-07-16 DIAGNOSIS — E53.8 VITAMIN B 12 DEFICIENCY: ICD-10-CM

## 2019-07-16 DIAGNOSIS — N40.1 BPH ASSOCIATED WITH NOCTURIA: ICD-10-CM

## 2019-07-16 DIAGNOSIS — J30.1 CHRONIC SEASONAL ALLERGIC RHINITIS DUE TO POLLEN: ICD-10-CM

## 2019-07-16 DIAGNOSIS — J45.30 MILD PERSISTENT ASTHMA WITHOUT COMPLICATION: Chronic | ICD-10-CM

## 2019-07-16 DIAGNOSIS — R14.2 FLATULENCE, ERUCTATION AND GAS PAIN: ICD-10-CM

## 2019-07-16 DIAGNOSIS — R14.1 FLATULENCE, ERUCTATION AND GAS PAIN: ICD-10-CM

## 2019-07-16 DIAGNOSIS — R14.3 FLATULENCE, ERUCTATION AND GAS PAIN: ICD-10-CM

## 2019-07-16 DIAGNOSIS — R35.1 BPH ASSOCIATED WITH NOCTURIA: ICD-10-CM

## 2019-07-16 DIAGNOSIS — K59.01 SLOW TRANSIT CONSTIPATION: ICD-10-CM

## 2019-07-16 DIAGNOSIS — K21.9 GASTROESOPHAGEAL REFLUX DISEASE WITHOUT ESOPHAGITIS: ICD-10-CM

## 2019-07-17 NOTE — TELEPHONE ENCOUNTER
"Requested Prescriptions   Pending Prescriptions Disp Refills     Cyanocobalamin (B-12) 1000 MCG TBCR  Last Written Prescription Date:  3/28/19  Last Fill Quantity: 90,  # refills: 2   Last office visit: 7/8/2019 with prescribing provider:  sid   Future Office Visit:   Next 5 appointments (look out 90 days)    Jul 22, 2019  3:00 PM CDT  Office Visit with Jayjay Connor MD, [facility] [employer] [facility]  [facility] ([facility]) [address] [place] [id number]  [phone]          90 tablet 2     Sig: Take 1,000 mcg by mouth daily       Vitamin Supplements (Adult) Protocol Passed - 7/16/2019  4:44 PM        Passed - High dose Vitamin D not ordered        Passed - Recent (12 mo) or future (30 days) visit within the authorizing provider's specialty     Patient had office visit in the last 12 months or has a visit in the next 30 days with authorizing provider or within the authorizing provider's specialty.  See \"Patient Info\" tab in inbasket, or \"Choose Columns\" in Meds & Orders section of the refill encounter.              Passed - Medication is active on med list        senna-docusate (SENOKOT-S/PERICOLACE) 8.6-50 MG tablet      Last Written Prescription Date:  6/1/18  Last Fill Quantity: 120,   # refills: 11  Last Office Visit: 7/8/19 [place]  Future Office visit:    Next 5 appointments (look out 90 days)    Jul 22, 2019  3:00 PM CDT  Office Visit with Jayjay Connor MD, [facility] [employer]  [facility] ([facility]) [address] [place] [address]  [phone]           Routing refill request to provider for review/approval because:  Drug not on the G, P or  Health refill protocol or controlled substance   120 tablet 11     Sig: Take 2 tablets by mouth 2 times daily       Laxatives Protocol Passed - 7/16/2019  " "4:44 PM        Passed - Patient is age 6 or older        Passed - Recent (12 mo) or future (30 days) visit within the authorizing provider's specialty     Patient had office visit in the last 12 months or has a visit in the next 30 days with authorizing provider or within the authorizing provider's specialty.  See \"Patient Info\" tab in inbasket, or \"Choose Columns\" in Meds & Orders section of the refill encounter.              Passed - Medication is active on med list        montelukast (SINGULAIR) 10 MG tablet  Last Written Prescription Date:  3/6/18  Last Fill Quantity: 30,  # refills: 11   Last office visit: 7/8/2019 with prescribing provider:  isd   Future Office Visit:   Next 5 appointments (look out 90 days)    Jul 22, 2019  3:00 PM CDT  Office Visit with Jayjay Connor MD, [facility] [employer] [facility]  [facility] [address] [place] [id number] [phone]          30 tablet 11     Sig: Take 1 tablet (10 mg) by mouth At Bedtime       Leukotriene Inhibitors Protocol Passed - 7/16/2019  4:44 PM        Passed - Patient is age 12 or older     If patient is under 16, ok to refill using age based dosing.           Passed - Asthma control assessment score within normal limits in last 6 months     Please review ACT score.           Passed - Medication is active on med list        Passed - Recent (6 mo) or future (30 days) visit within the authorizing provider's specialty     Patient had office visit in the last 6 months or has a visit in the next 30 days with authorizing provider or within the authorizing provider's specialty.  See \"Patient Info\" tab in inbasket, or \"Choose Columns\" in Meds & Orders section of the refill encounter.            omeprazole (PRILOSEC) 40 MG DR capsule  Last Written Prescription Date:  6/1/18  Last Fill Quantity: 91,  # refills: 3   Last office visit: 7/8/2019 with " "prescribing provider:  sid   Future Office Visit:   Next 5 appointments (look out 90 days)    Jul 22, 2019  3:00 PM CDT  Office Visit with Jayjay Connor MD, [facility] [employer] [facility]  [facility] [address] [place] [phone]  [phone]          91 capsule 3     Sig: Take 1 capsule (40 mg) by mouth daily Take 30-60 minutes before a meal.       PPI Protocol Passed - 7/16/2019  4:44 PM        Passed - Not on Clopidogrel (unless Pantoprazole ordered)        Passed - No diagnosis of osteoporosis on record        Passed - Recent (12 mo) or future (30 days) visit within the authorizing provider's specialty     Patient had office visit in the last 12 months or has a visit in the next 30 days with authorizing provider or within the authorizing provider's specialty.  See \"Patient Info\" tab in inbasket, or \"Choose Columns\" in Meds & Orders section of the refill encounter.              Passed - Medication is active on med list        Passed - Patient is age 18 or older        finasteride (PROSCAR) 5 MG tablet  Last Written Prescription Date:  6/1/18  Last Fill Quantity: 90,  # refills: 3   Last office visit: 7/8/2019 with prescribing provider:  sid   Future Office Visit:   Next 5 appointments (look out 90 days)    Jul 22, 2019  3:00 PM CDT  Office Visit with Jayjay Connor MD, [facility] [employer] [facility]  [facility] [address] [place] [id number]  [phone]          90 tablet 3     Sig: Take 1 tablet (5 mg) by mouth daily       Alpha Blockers Failed - 7/16/2019  4:44 PM        Failed - Patient does not have Tadalafil, Vardenafil, or Sildenafil on their medication list        Passed - Blood pressure under 140/90 in past 12 months     BP Readings from Last 3 Encounters:   07/08/19 120/60 " "  02/14/19 124/64   12/14/18 130/50                 Passed - Recent (12 mo) or future (30 days) visit within the authorizing provider's specialty     Patient had office visit in the last 12 months or has a visit in the next 30 days with authorizing provider or within the authorizing provider's specialty.  See \"Patient Info\" tab in inbasket, or \"Choose Columns\" in Meds & Orders section of the refill encounter.              Passed - Medication is active on med list        Passed - Patient is 18 years of age or older          "

## 2019-07-18 ENCOUNTER — TELEPHONE (OUTPATIENT)
Dept: FAMILY MEDICINE | Facility: CLINIC | Age: 74
End: 2019-07-18

## 2019-07-18 NOTE — TELEPHONE ENCOUNTER
Our goal is to have forms completed within 72 hours, however some forms may require a visit or additional information.    What clinic location was the form placed at [facility] or [place].?     Who is the form from?   Where did the form come from? Faxed to clinic   The form was placed in the inbox of Zack Hudson Jr MD      Please fax to [phone]  Phone number: [phone]    Additional comments: [facility] [employer] - cert of med necessity    Call take on 7/18/2019 at 10:20 AM by Megan Lord

## 2019-07-19 ENCOUNTER — TELEPHONE (OUTPATIENT)
Dept: FAMILY MEDICINE | Facility: CLINIC | Age: 74
End: 2019-07-19

## 2019-07-19 RX ORDER — FINASTERIDE 5 MG/1
5 TABLET, FILM COATED ORAL DAILY
Qty: 90 TABLET | Refills: 3 | Status: SHIPPED | OUTPATIENT
Start: 2019-07-19 | End: 2020-02-10

## 2019-07-19 RX ORDER — MONTELUKAST SODIUM 10 MG/1
10 TABLET ORAL AT BEDTIME
Qty: 90 TABLET | Refills: 1 | Status: SHIPPED | OUTPATIENT
Start: 2019-07-19 | End: 2020-01-27

## 2019-07-19 RX ORDER — OMEPRAZOLE 40 MG/1
40 CAPSULE, DELAYED RELEASE ORAL DAILY
Qty: 90 CAPSULE | Refills: 2 | Status: SHIPPED | OUTPATIENT
Start: 2019-07-19 | End: 2020-02-10

## 2019-07-19 RX ORDER — AMOXICILLIN 250 MG
2 CAPSULE ORAL 2 TIMES DAILY
Qty: 360 TABLET | Refills: 2 | Status: SHIPPED | OUTPATIENT
Start: 2019-07-19 | End: 2020-01-27

## 2019-07-19 NOTE — TELEPHONE ENCOUNTER
Our goal is to have forms completed within 72 hours, however some forms may require a visit or additional information.    What clinic location was the form placed at [facility] or [place].?     Who is the form from?   Where did the form come from? Faxed to clinic   The form was placed in the inbox of Zack Hudson Jr MD      Please fax to [phone]  Phone number: [phone]    Additional comments:  Home Care - [facility] 6/26/2019 - 8/24/2019    Call take on 7/19/2019 at 2:46 PM by Megan Lord

## 2019-07-19 NOTE — TELEPHONE ENCOUNTER
Routing refill request to provider for review/approval because:  Patient is taking taladafil (CIALIS)    Prescription approved for Senna, Omeprazole, and Montelukast (SINGULAIR) per [facility] Refill Protocol.    Rx denied for B-12 due to pharmacy has refills on file.

## 2019-07-22 ENCOUNTER — OFFICE VISIT (OUTPATIENT)
Dept: FAMILY MEDICINE | Facility: CLINIC | Age: 74
End: 2019-07-22
Payer: COMMERCIAL

## 2019-07-22 ENCOUNTER — MEDICAL CORRESPONDENCE (OUTPATIENT)
Dept: HEALTH INFORMATION MANAGEMENT | Facility: CLINIC | Age: 74
End: 2019-07-22

## 2019-07-22 VITALS
BODY MASS INDEX: 35.93 KG/M2 | WEIGHT: 251 LBS | HEART RATE: 95 BPM | RESPIRATION RATE: 20 BRPM | OXYGEN SATURATION: 99 % | HEIGHT: 70 IN | DIASTOLIC BLOOD PRESSURE: 76 MMHG | SYSTOLIC BLOOD PRESSURE: 136 MMHG | TEMPERATURE: 97.5 F

## 2019-07-22 DIAGNOSIS — E11.21 TYPE 2 DIABETES MELLITUS WITH DIABETIC NEPHROPATHY, WITHOUT LONG-TERM CURRENT USE OF INSULIN (H): Primary | Chronic | ICD-10-CM

## 2019-07-22 DIAGNOSIS — Z53.9 DIAGNOSIS NOT YET DEFINED: Primary | ICD-10-CM

## 2019-07-22 DIAGNOSIS — K59.04 CHRONIC IDIOPATHIC CONSTIPATION: Chronic | ICD-10-CM

## 2019-07-22 DIAGNOSIS — I10 BENIGN ESSENTIAL HYPERTENSION: ICD-10-CM

## 2019-07-22 DIAGNOSIS — E08.49 OTHER DIABETIC NEUROLOGICAL COMPLICATION ASSOCIATED WITH DIABETES MELLITUS DUE TO UNDERLYING CONDITION (H): ICD-10-CM

## 2019-07-22 DIAGNOSIS — J45.30 MILD PERSISTENT ASTHMA WITHOUT COMPLICATION: Chronic | ICD-10-CM

## 2019-07-22 DIAGNOSIS — N40.0 HYPERTROPHY OF PROSTATE WITHOUT URINARY OBSTRUCTION: ICD-10-CM

## 2019-07-22 DIAGNOSIS — K21.9 GASTROESOPHAGEAL REFLUX DISEASE WITHOUT ESOPHAGITIS: ICD-10-CM

## 2019-07-22 PROCEDURE — 99214 OFFICE O/P EST MOD 30 MIN: CPT | Performed by: INTERNAL MEDICINE

## 2019-07-22 RX ORDER — GLIPIZIDE 10 MG/1
10 TABLET, FILM COATED, EXTENDED RELEASE ORAL DAILY
Qty: 30 TABLET | Refills: 11 | Status: SHIPPED | OUTPATIENT
Start: 2019-07-22 | End: 2020-02-10

## 2019-07-22 RX ORDER — METFORMIN HYDROCHLORIDE 1000 MG/1
1000 TABLET, FILM COATED, EXTENDED RELEASE ORAL
Qty: 60 TABLET | Refills: 11 | Status: SHIPPED | OUTPATIENT
Start: 2019-07-22 | End: 2020-02-10

## 2019-07-22 ASSESSMENT — MIFFLIN-ST. JEOR: SCORE: 1879.78

## 2019-07-22 NOTE — PATIENT INSTRUCTIONS
The medication list that you brought in is accurate.        Keep taking everything.        Cut back on carbohydrates, in order to improve your diabetes control.

## 2019-07-22 NOTE — PROGRESS NOTES
Subjective     Suze Devlin is a 74 year old male who presents to clinic today for the following health issues:    HPI   Discuss recent lab results from 7/8/2019. And multiple concerns re: meds(unknown which, but states he is taking all of them?)    Patient is here with an .           He brought in a large sack of pill bottles today.               He also brought in the medication list that his visiting nurse is following when she sets out his medications for him. He states that he does not know his medications by name, but he takes everything that is set out for him.              He wants me to explain today what his medications are for.                                   2 weeks ago, I added gabapentin 300 mg at bedtime. He reports this has been very helpful for him.                         He recently started taking omeprazole again. This has been very helpful in treatment of acid reflux symptoms.    Current Outpatient Medications   Medication Sig Dispense Refill     acetaminophen 500 MG CAPS Take 1 tablet by mouth 4 times daily 120 capsule 11     albuterol (PROAIR HFA/PROVENTIL HFA/VENTOLIN HFA) 108 (90 Base) MCG/ACT inhaler Inhale 2 puffs into the lungs 4 times daily as needed 1 Inhaler 2     aspirin 81 MG EC tablet Take 1 tablet (81 mg) by mouth daily 90 tablet 3     atorvastatin (LIPITOR) 20 MG tablet Take 1 tablet (20 mg) by mouth daily 90 tablet 3     blood glucose monitoring (NO BRAND SPECIFIED) test strip Use to test blood sugars two times daily or as directed 100 each 0     blood glucose monitoring (ONE TOUCH DELICA) lancets Use to test blood sugars  TWICE DAILY    or as directed. 3 Box prn     blood glucose monitoring (ONE TOUCH ULTRA 2) meter device kit Use to test blood sugars TWICE DAILY PRN  times daily or as directed. 1 kit 0     Cyanocobalamin (B-12) 1000 MCG TBCR Take 1,000 mcg by mouth daily 90 tablet 2     diclofenac (VOLTAREN) 1 % GEL Apply 4 grams to knees or 2 grams to hands  four times daily using enclosed dosing card. 100 g 1     finasteride (PROSCAR) 5 MG tablet Take 1 tablet (5 mg) by mouth daily 90 tablet 3     fluticasone-salmeterol (ADVAIR) 100-50 MCG/DOSE diskus inhaler Inhale 1 puff into the lungs 2 times daily 1 Inhaler 5     gabapentin (NEURONTIN) 300 MG capsule Take 1 capsule (300 mg) by mouth At Bedtime 30 capsule 5     glipiZIDE (GLUCOTROL XL) 10 MG 24 hr tablet Take 1 tablet (10 mg) by mouth daily 30 tablet 0     ibuprofen (ADVIL/MOTRIN) 200 MG capsule Take 2 capsules (400 mg) by mouth every 8 hours as needed for fever 120 capsule 0     ipratropium - albuterol 0.5 mg/2.5 mg/3 mL (DUONEB) 0.5-2.5 (3) MG/3ML neb solution Take 1 vial (3 mLs) by nebulization every 6 hours as needed for shortness of breath / dyspnea or wheezing 90 vial 0     lidocaine (XYLOCAINE) 5 % ointment One application 4 x daily to affected areas lower back and knees if necessary 300 g 0     losartan (COZAAR) 25 MG tablet Take 1 tablet (25 mg) by mouth daily 90 tablet 3     metFORMIN modified (GLUMETZA) 1000 MG 24 hr tablet Take 1 tablet (1,000 mg) by mouth daily (with dinner) 90 tablet 1     montelukast (SINGULAIR) 10 MG tablet Take 1 tablet (10 mg) by mouth At Bedtime 90 tablet 1     omeprazole (PRILOSEC) 40 MG DR capsule Take 1 capsule (40 mg) by mouth daily Take 30-60 minutes before a meal. 90 capsule 2     order for DME Equipment being ordered: Nebulizer 1 each 0     order for DME Glucometer, brand as covered by insurance.-(ONE TOUCH DELICA) 1 each 0     pioglitazone (ACTOS) 15 MG tablet Take 1 tablet (15 mg) by mouth daily 90 tablet 1     polyethylene glycol (MIRALAX) powder Take 17 g (1 capful) by mouth daily as needed for constipation 119 g 0     senna-docusate (SENOKOT-S/PERICOLACE) 8.6-50 MG tablet Take 2 tablets by mouth 2 times daily 360 tablet 2     sitagliptin (JANUVIA) 50 MG tablet Take 1 tablet (50 mg) by mouth daily 90 tablet 3     tamsulosin (FLOMAX) 0.4 MG capsule Take 1 capsule (0.4  mg) by mouth daily 90 capsule 3     vitamin D3 (CHOLECALCIFEROL) 2000 units tablet Take 1 tablet by mouth daily 90 tablet 3     tadalafil (CIALIS) 5 MG tablet Take 1 tablet (5 mg) by mouth daily Never use with nitroglycerin, terazosin or doxazosin. (Patient not taking: Reported on 7/8/2019) 90 tablet 0     triamcinolone (KENALOG) 0.1 % external cream Apply sparingly to affected area three times daily as needed (Patient not taking: Reported on 7/8/2019) 80 g 11     No Known Allergies  Recent Labs   Lab Test 07/08/19  1530 12/14/18  1109 10/25/18  1254 09/12/18  1048  09/12/17  1227  10/31/16  1459 03/11/16  1000   A1C 7.6* 8.5*  --  7.8*   < > 8.0*   < > 6.7* 9.7*   LDL  --   --  65  --   --  74  --  85 135*   HDL  --   --  52  --   --  44  --  47 55   TRIG  --   --  147  --   --  158*  --  146 122   ALT 25  --  26 28  --  30  --  28  --    CR 1.04  --   --  0.95  --  1.09  --  1.04 1.00   GFRESTIMATED 70  --   --  77  --  66  --  70 73   GFRESTBLACK 81  --   --  >90  --  80  --  85 89   POTASSIUM 4.2  --   --  3.8  --  4.1  --  4.4 5.3   TSH  --   --   --   --   --  3.75  --   --  4.36    < > = values in this interval not displayed.      BP Readings from Last 3 Encounters:   07/22/19 136/76   07/08/19 120/60   02/14/19 124/64    Wt Readings from Last 3 Encounters:   07/22/19 113.9 kg (251 lb)   07/08/19 113.4 kg (250 lb)   02/14/19 112.9 kg (249 lb)                      Reviewed and updated as needed this visit by Provider         Review of Systems   ROS COMP: CONSTITUTIONAL:NEGATIVE for fever, chills, change in weight  RESP:POSITIVE for Hx asthma and He uses his inhalers on an occasional basis.         He is still taking montelukast. This was started nearly 3 years ago, when it was felt his asthma control was inadequate.  CV: NEGATIVE for chest pain, palpitations or peripheral edema  PSYCHIATRIC: NEGATIVE for changes in mood or affect      Objective    /76 (BP Location: Left arm, Patient Position: Chair, Cuff  "Size: Adult Large)   Pulse 95   Temp 97.5  F (36.4  C)   Resp 20   Ht 1.778 m (5' 10\")   Wt 113.9 kg (251 lb)   SpO2 99%   BMI 36.01 kg/m    Body mass index is 36.01 kg/m .  Physical Exam   GENERAL APPEARANCE: alert, no distress and over weight  CV: regular rates and rhythm, normal S1 S2, no S3 or S4 and no murmur, click or rub  PSYCH: affect normal/bright    Diagnostic Test Results:  None today        Assessment & Plan     Suze was seen today for results.    Diagnoses and all orders for this visit:    Type 2 diabetes mellitus without complication, without long-term current use of insulin (H)         BMI:   Estimated body mass index is 36.01 kg/m  as calculated from the following:    Height as of this encounter: 1.778 m (5' 10\").    Weight as of this encounter: 113.9 kg (251 lb).   Weight management plan: Discussed healthy diet and exercise guidelines        Summary and implications:  We reviewed multiple issues.           We reviewed all of the issues on the diagnoses list.                    We reviewed all of the medications. He has all his pill bottles, though he has duplicate bottles for several of his medications.                       We discarded extra bottles .                        Thirty minute visit,over half counseling and coordination of care    Patient Instructions   The medication list that you brought in is accurate.        Keep taking everything.        Cut back on carbohydrates, in order to improve your diabetes control.      Return in about 3 months (around 10/22/2019) for diabetes follow up, labs will be needed.    Jayjay Connor MD  [facility]      "

## 2019-08-07 ENCOUNTER — PATIENT OUTREACH (OUTPATIENT)
Dept: GERIATRIC MEDICINE | Facility: CLINIC | Age: 74
End: 2019-08-07

## 2019-08-07 NOTE — PROGRESS NOTES
[facility] Care Coordination Contact    Called member to schedule annual HRA home visit. HRA has been scheduled for 08/08/2019.   Digna Chen RN BSN PHN  [facility]   RN Care Coordinator   Phone:   [phone]  Fax:       [phone]

## 2019-08-07 NOTE — PROGRESS NOTES
[facility] Care Coordination Contact    Called member to schedule annual HRA home visit. Left a message requesting a return call to schedule HRA.   Digna Chen RN BSN PHN  [facility]   RN Care Coordinator   Phone:   [phone]  Fax:       [phone]

## 2019-08-08 ENCOUNTER — PATIENT OUTREACH (OUTPATIENT)
Dept: GERIATRIC MEDICINE | Facility: CLINIC | Age: 74
End: 2019-08-08

## 2019-08-08 ASSESSMENT — ACTIVITIES OF DAILY LIVING (ADL): DEPENDENT_IADLS:: CLEANING;COOKING;LAUNDRY;SHOPPING;MEAL PREPARATION;MONEY MANAGEMENT;TRANSPORTATION

## 2019-08-08 ASSESSMENT — PATIENT HEALTH QUESTIONNAIRE - PHQ9: SUM OF ALL RESPONSES TO PHQ QUESTIONS 1-9: 4

## 2019-08-08 NOTE — PROGRESS NOTES
[facility] Care Coordination Contact    [facility] Home Visit Assessment     Home visit for Health Risk Assessment with Suze Devlin completed on August 8, 2019    Type of residence:: Apartment  Current living arrangement:: I live alone     Assessment completed with:: Patient    Current Care Plan  Member currently receiving the following home care services: Skilled Nursing   Member currently receiving the following community resources: Day Care, Transportation Services, PCA and Homemaking.      Medication Review  Medication reconciliation completed in Epic: Yes  Medication set-up & administration: RN set up every two weeks.  Self-administers medications.  Medication Risk Assessment Medication (1 or more, place referral to MTM): Taking 1 or more high-risk medications for adults >65 years  MTM Referral Placed: No: Declined at this time    Mental/Behavioral Health   Depression Screening: See PHQ assessment flowsheet.   Mental health DX:: Yes(Anxiety)      Mental Health Diagnosis: Yes: Anxiety  Mental Health Services: None: No further intervention needed at this time.    Falls Assessment:   Fallen 2 or more times in the past year?: No   Any fall with injury in the past year?: No    ADL/IADL Dependencies:   Dependent ADLs:: Dressing, Toileting, Bathing  Dependent IADLs:: Cleaning, Cooking, Laundry, Shopping, Meal Preparation, Money Management, Transportation    [facility] Health Plan sponsored benefits: Shared information re: Silver Sneakers/gym memberships, ASA, Calcium +D.    PCA Assessment completed at visit: Yes     Elderly Waiver Eligibility: Yes-will continue on EW    Care Plan & Recommendations: Member declined any recent ED visit or hospitalization.  This CC to review current POC and will continue PCA services.  This CC to review ADC, ADC transporation and homemaking.  Member will continue to received SNV to assist with medication.      See LTCC for detailed assessment information.    Follow-Up  Plan: Member informed of future contact, plan to f/u with member with a 6 month telephone assessment.  Contact information shared with member and family, encouraged member to call with any questions or concerns at any time.    [place] care continuum providers: Please refer to Health Care Home on the Epic Problem List to view this patient's [facility] Care Plan Summary.    Digna Chen RN BSN PHN  [facility]   RN Care Coordinator   Phone:   [phone]  Fax:       [phone]

## 2019-08-21 ENCOUNTER — PATIENT OUTREACH (OUTPATIENT)
Dept: GERIATRIC MEDICINE | Facility: CLINIC | Age: 74
End: 2019-08-21

## 2019-08-21 ENCOUNTER — TELEPHONE (OUTPATIENT)
Dept: FAMILY MEDICINE | Facility: CLINIC | Age: 74
End: 2019-08-21

## 2019-08-21 NOTE — TELEPHONE ENCOUNTER
Home care nurse, [place]    Skilled Nursin every other week for 9 weeks.     Diabetic management    Last OV: 2019    Verbal okay was given for this patient. Home care will send order for a signature from provider.

## 2019-08-21 NOTE — PROGRESS NOTES
[facility] Care Coordination Contact    Zack Coello      I am the [facility] care coordinator for Suze Devlin, and I am writing to notify you of a change in services.   Homemaking services have been reduced from six hours per week to four hours per week.  This change has occurred because four hour is sufficient for current plan of care.    I am required by the health plan to notify you of this change. No action is required on your part. Please do not hesitate to contact me with any questions or concerns. Thank you.    Digna Chen, RN BSN PHN  [facility]   RN Care Coordinator   Phone:   [phone]  Fax:       [phone]

## 2019-08-21 NOTE — PROGRESS NOTES
[facility] Care Coordination Contact    Received after visit chart from care coordinator.  Completed following tasks: Mailed copy of care plan to client, Updated services in access and Submitted referrals/auths for [facility]: ADC with Transportation; [facility]: Homemaking; [employer]: Go to Card  Chart was returned to CC.    Provider Signature - No POC Shared:  Member indicates that they do not want their POC shared with any EW providers.  [facility]:  Emailed completed PCA assessment to [facility].  Faxed copy of PCA assessment to PCA Agency and mailed copy to member.  Faxed MD Communication to PCP.     Carla Loving  Care Management Specialist  [facility]  [phone]

## 2019-08-21 NOTE — LETTER
August 21, 2019      SUZE VILLALTA  [address]     [place] [address]      Dear Suze:    At [facility], we are dedicated to improving your health and well-being. Enclosed is the Comprehensive Care Plan that we developed with you on 08/08/2019. Please review the Care Plan carefully.    As a reminder, some of the things we discussed at your visit include:    Your physical and mental health    Ways to reduce falls    Health care needs you may have    Don t forget to contact your care coordinator if you:    Have been hospitalized or plan to be hospitalized     Have had a fall     Have experienced a change in physical health    Are experiencing emotional problems     If you do not agree with your Care Plan, have questions about it, or have experienced a change in your needs, please call me at [phone]. If you are hearing impaired, please call the [place] [employer] at 415 or [phone] (vegmue-ys-vrwbrt relay service).    Sincerely,    PATRICIA Navas, RN, PHN    E-mail: [email or web address]  Phone: [phone]      [facility] ([facility]) is a health plan that contracts with both Medicare and the [place] Medical Assistance (Medicaid) program to provide benefits of both programs to enrollees. Enrollment in [facility] depends on contract renewal.    [id number])     [id number] (11/18)

## 2019-08-23 ENCOUNTER — PATIENT OUTREACH (OUTPATIENT)
Dept: GERIATRIC MEDICINE | Facility: CLINIC | Age: 74
End: 2019-08-23

## 2019-08-23 NOTE — PROGRESS NOTES
Received a request to submit a DTR for the reduction of Homemaking. Documentation completed and faxed to the health plan. Care Coordinator aware.    Angela Riley RN  Utilization   [facility]  [phone]

## 2019-08-26 ENCOUNTER — TELEPHONE (OUTPATIENT)
Dept: FAMILY MEDICINE | Facility: CLINIC | Age: 74
End: 2019-08-26

## 2019-08-26 DIAGNOSIS — Z53.9 DIAGNOSIS NOT YET DEFINED: Primary | ICD-10-CM

## 2019-08-26 PROCEDURE — 99207 C MD RECERTIFICATION HHA PT: CPT | Performed by: FAMILY MEDICINE

## 2019-08-26 NOTE — TELEPHONE ENCOUNTER
Our goal is to have forms completed within 72 hours, however some forms may require a visit or additional information.    What clinic location was the form placed at [facility] or [place].?     Who is the form from?   Where did the form come from? Faxed to clinic   The form was placed in the inbox of Zack Hudson Jr MD      Please fax to [phone]  Phone number: [phone]    Additional comments: [facility] 8/25/19 - 10/23/19    Call take on 8/26/2019 at 3:05 PM by Megan Lord

## 2019-09-03 ENCOUNTER — PATIENT OUTREACH (OUTPATIENT)
Dept: GERIATRIC MEDICINE | Facility: CLINIC | Age: 74
End: 2019-09-03

## 2019-09-03 NOTE — PROGRESS NOTES
[facility] Care Coordination Contact  CC received notification of Emergency Room visit today:    Brenda Domingo Halimo S RN   Suze Claus had an ER visit on 8/31/19 at [facility] due to acute kidney injury.    Member also had SOB and chest pain secondary to asthma exacerbation.    CC contacted member and left a message requesting a return call.  Member has a follow-up appointment with PCP: Yes: scheduled on 09/03/2019   Care plan reviewed  PCP notified of ED visit via EMR.  Digna Chen RN BSN PHN  [facility]   RN Care Coordinator   Phone:   [phone]  Fax:       [phone]

## 2019-09-11 ENCOUNTER — OFFICE VISIT (OUTPATIENT)
Dept: FAMILY MEDICINE | Facility: CLINIC | Age: 74
End: 2019-09-11
Payer: COMMERCIAL

## 2019-09-11 ENCOUNTER — ANCILLARY PROCEDURE (OUTPATIENT)
Dept: GENERAL RADIOLOGY | Facility: CLINIC | Age: 74
End: 2019-09-11
Attending: FAMILY MEDICINE
Payer: COMMERCIAL

## 2019-09-11 VITALS
WEIGHT: 254 LBS | DIASTOLIC BLOOD PRESSURE: 62 MMHG | RESPIRATION RATE: 12 BRPM | BODY MASS INDEX: 36.45 KG/M2 | HEART RATE: 90 BPM | SYSTOLIC BLOOD PRESSURE: 108 MMHG | OXYGEN SATURATION: 95 % | TEMPERATURE: 97.9 F

## 2019-09-11 DIAGNOSIS — J45.41 MODERATE PERSISTENT ASTHMA WITH EXACERBATION: ICD-10-CM

## 2019-09-11 DIAGNOSIS — J45.30 MILD PERSISTENT ASTHMA WITHOUT COMPLICATION: Primary | Chronic | ICD-10-CM

## 2019-09-11 DIAGNOSIS — M54.50 CHRONIC MIDLINE LOW BACK PAIN WITHOUT SCIATICA: ICD-10-CM

## 2019-09-11 DIAGNOSIS — G89.29 CHRONIC MIDLINE LOW BACK PAIN WITHOUT SCIATICA: ICD-10-CM

## 2019-09-11 DIAGNOSIS — M79.652 PAIN OF LEFT THIGH: ICD-10-CM

## 2019-09-11 PROCEDURE — 71046 X-RAY EXAM CHEST 2 VIEWS: CPT | Mod: FY

## 2019-09-11 PROCEDURE — 99214 OFFICE O/P EST MOD 30 MIN: CPT | Performed by: FAMILY MEDICINE

## 2019-09-11 RX ORDER — PREDNISONE 20 MG/1
TABLET ORAL
COMMUNITY
Start: 2019-09-01 | End: 2020-02-10

## 2019-09-11 RX ORDER — PREDNISONE 20 MG/1
20 TABLET ORAL 2 TIMES DAILY
Qty: 10 TABLET | Refills: 0 | Status: SHIPPED | OUTPATIENT
Start: 2019-09-11 | End: 2020-02-10

## 2019-09-11 NOTE — PROGRESS NOTES
Subjective     Suze Devlin is a 74 year old male who presents to clinic today for the following health issues:    HPI     Due to language barrier, an  was present during the history-taking and subsequent discussion (and for part of the physical exam) with this patient.    Patient mentions he fell a week ago and hurt left leg.    Hospital Follow-up Visit:    Hospital/Nursing Home/ Rehab Facility: [facility]  Date of Admission: 08/31/2019  Date of Discharge: 09/01/2019  Reason(s) for Admission: SKYLER            Problems taking medications regularly:  None       Medication changes since discharge: Added prednisone for 3 days. Completed       Problems adhering to non-medication therapy:  None    Summary of hospitalization:  CareEverywhere information obtained and reviewed  Diagnostic Tests/Treatments reviewed.  Follow up needed: here  Other Healthcare Providers Involved in Patient s Care:         Homecare  Update since discharge: stable.     Post Discharge Medication Reconciliation: discharge medications reconciled and changed, per note/orders (see AVS).  Plan of care communicated with patient     Coding guidelines for this visit:  Type of Medical   Decision Making Face-to-Face Visit       within 7 Days of discharge Face-to-Face Visit        within 14 days of discharge   Moderate Complexity [id number]   High Complexity [id number]            Musculoskeletal problem/pain      Duration: 1 week    Description  Location: Lt hip and thigh    Intensity:  moderate    Accompanying signs and symptoms: none    History  Previous similar problem: no   Previous evaluation:  none    Precipitating or alleviating factors:  Trauma or overuse: YES  Aggravating factors include: standing and walking    Therapies tried and outcome: nothing      BP Readings from Last 3 Encounters:   09/11/19 108/62   07/22/19 136/76   07/08/19 120/60    Wt Readings from Last 3 Encounters:   09/11/19 115.2 kg (254 lb)   07/22/19 113.9 kg (251  lb)   07/08/19 113.4 kg (250 lb)                      Reviewed and updated as needed this visit by Provider         Review of Systems   ROS COMP: CONSTITUTIONAL: NEGATIVE for fever, chills, change in weight  ENT/MOUTH: NEGATIVE for ear, mouth and throat problems  RESP:POSITIVE for Hx asthma and wheezing  CV: NEGATIVE for chest pain, palpitations or peripheral edema  MUSCULOSKELETAL: POSITIVE  for arthralgias Lt hip and back pain low back  ENDOCRINE: NEGATIVE for temperature intolerance, skin/hair changes and POSITIVE  for HX diabetes      Objective    /62 (BP Location: Left arm, Patient Position: Sitting, Cuff Size: Adult Large)   Pulse 90   Temp 97.9  F (36.6  C) (Tympanic)   Resp 12   Wt 115.2 kg (254 lb)   SpO2 95%   BMI 36.45 kg/m    Body mass index is 36.45 kg/m .  Physical Exam   GENERAL: healthy, alert and no distress  NECK: no adenopathy, no asymmetry, masses, or scars and thyroid normal to palpation  RESP: rhonchi R mid posterior and L mid posterior, expiratory wheezes R mid posterior and L mid posterior and prolonged expiratory phase  CV: regular rate and rhythm, normal S1 S2, no S3 or S4, no murmur, click or rub, no peripheral edema and peripheral pulses strong  ABDOMEN: soft, nontender, no hepatosplenomegaly, no masses and bowel sounds normal  MS: LLE exam shows no deformities, ROM of all joints is normal and no evidence of joint instability    Diagnostic Test Results:  Labs reviewed in Epic  CXR - unremarkable, no active disease.  I viewed xray myself, radiology report pending           Assessment & Plan     Suze was seen today for uri and asthma.    Diagnoses and all orders for this visit:    Mild persistent asthma without complication    Moderate persistent asthma with exacerbation  -     XR Chest 2 Views; Future  -     predniSONE (DELTASONE) 20 MG tablet; Take 1 tablet (20 mg) by mouth 2 times daily    Pain of left thigh    Chronic midline low back pain without sciatica         BMI:  "  Estimated body mass index is 36.45 kg/m  as calculated from the following:    Height as of 7/22/19: 1.778 m (5' 10\").    Weight as of this encounter: 115.2 kg (254 lb).   Weight management plan: Discussed healthy diet and exercise guidelines    Pt produces form for adult day care that needs to be completed  Advised that I cannot do that today    FUTURE APPOINTMENTS:       - Follow-up visit in 1 week with Dr Hudson to complete form for Adult Day Care  Patient Instructions   Alternate ice & heat.  Use Ice massage on trigger point areas.  Do gentle Range of motion exercises      Return in about 1 week (around 9/18/2019) for form for adult day care.    Domingo Rodriguez MD  [facility]        "

## 2019-10-22 ENCOUNTER — TELEPHONE (OUTPATIENT)
Dept: FAMILY MEDICINE | Facility: CLINIC | Age: 74
End: 2019-10-22

## 2019-10-22 NOTE — TELEPHONE ENCOUNTER
Reason for Call: Request for an order or referral:    Order or referral being requested: home care orders    Date needed: as soon as possible    Has the patient been seen by the PCP for this problem? YES    Additional comments: Judith garcía nurse with [facility] care called to get verbal orders from Dr. Zack Hudson for skilled nursing visits one time a week every other week for nine weeks with two PRN's.    Phone number Patient can be reached at:  Other phone number: [phone]    Best Time:  Any    Can we leave a detailed message on this number?  YES    Call taken on 10/22/2019 at 11:02 AM by Nella Fajardo

## 2019-10-22 NOTE — TELEPHONE ENCOUNTER
Verbal orders given to Judith for:   Skilled nursing visits one time a week every other week for nine weeks with two PRN's for medication management.

## 2019-11-04 ENCOUNTER — TELEPHONE (OUTPATIENT)
Dept: FAMILY MEDICINE | Facility: CLINIC | Age: 74
End: 2019-11-04

## 2019-11-04 DIAGNOSIS — Z53.9 DIAGNOSIS NOT YET DEFINED: Primary | ICD-10-CM

## 2019-11-04 PROCEDURE — 99207 C MD RECERTIFICATION HHA PT: CPT | Performed by: FAMILY MEDICINE

## 2019-11-04 NOTE — TELEPHONE ENCOUNTER
Our goal is to have forms completed within 72 hours, however some forms may require a visit or additional information.    What clinic location was the form placed at [facility] or [place].?     Who is the form from?   Where did the form come from? Faxed to clinic   The form was placed in the inbox of Zack Hudson Jr MD      Please fax to [phone]  Phone number: [phone]    Additional comments: [facility] 10/24/2019 - 12/22/2019    Call take on 11/4/2019 at 2:33 PM by Megan Lord

## 2019-12-20 ENCOUNTER — DOCUMENTATION ONLY (OUTPATIENT)
Dept: CARE COORDINATION | Facility: CLINIC | Age: 74
End: 2019-12-20

## 2019-12-20 NOTE — PROGRESS NOTES
[facility] now requests orders and shares plan of care/discharge summaries for some patients through [employer].  Please REPLY TO THIS MESSAGE OR ROUTE BACK TO THE AUTHOR in order to give authorization for orders when needed.  This is considered a verbal order, you will still receive a faxed copy of orders for signature.  Thank you for your assistance in improving collaboration for our patients.    ORDER    SN 1 every other week 9, 2 as needed for medication management

## 2020-01-13 ENCOUNTER — TELEPHONE (OUTPATIENT)
Dept: FAMILY MEDICINE | Facility: CLINIC | Age: 75
End: 2020-01-13

## 2020-01-13 NOTE — TELEPHONE ENCOUNTER
Our goal is to have forms completed within 72 hours, however some forms may require a visit or additional information.    What clinic location was the form placed at [facility] or [place].?     Who is the form from?   Where did the form come from? Faxed to clinic   The form was placed in the inbox of Zack Hudson Jr MD      Please fax to [phone]  Phone number: [phone]    Additional comments: [facility] 12/23/2019 - 2/20/2020    Call take on 1/13/2020 at 2:14 PM by Megan Lord

## 2020-01-14 DIAGNOSIS — Z53.9 DIAGNOSIS NOT YET DEFINED: Primary | ICD-10-CM

## 2020-01-14 PROCEDURE — G0179 MD RECERTIFICATION HHA PT: HCPCS | Performed by: FAMILY MEDICINE

## 2020-01-17 DIAGNOSIS — I10 HYPERTENSION GOAL BP (BLOOD PRESSURE) < 140/80: Chronic | ICD-10-CM

## 2020-01-17 RX ORDER — LOSARTAN POTASSIUM 25 MG/1
25 TABLET ORAL DAILY
Qty: 90 TABLET | Refills: 1 | Status: SHIPPED | OUTPATIENT
Start: 2020-01-17 | End: 2020-02-10

## 2020-01-17 NOTE — TELEPHONE ENCOUNTER
"Requested Prescriptions   Pending Prescriptions Disp Refills     losartan (COZAAR) 25 MG tablet  Last Written Prescription Date:  12/3/2018  Last Fill Quantity: 90 tablet,  # refills: 3   Last office visit: 9/11/2019 with prescribing provider:  Jennifer   Future Office Visit:     90 tablet 3     Sig: Take 1 tablet (25 mg) by mouth daily       Angiotensin-II Receptors Passed - 1/17/2020 11:45 AM        Passed - Last blood pressure under 140/90 in past 12 months     BP Readings from Last 3 Encounters:   09/11/19 108/62   07/22/19 136/76   07/08/19 120/60                 Passed - Recent (12 mo) or future (30 days) visit within the authorizing provider's specialty     Patient has had an office visit with the authorizing provider or a provider within the authorizing providers department within the previous 12 mos or has a future within next 30 days. See \"Patient Info\" tab in inbasket, or \"Choose Columns\" in Meds & Orders section of the refill encounter.              Passed - Medication is active on med list        Passed - Patient is age 18 or older        Passed - Normal serum creatinine on file in past 12 months     Recent Labs   Lab Test 07/08/19  1530   CR 1.04             Passed - Normal serum potassium on file in past 12 months     Recent Labs   Lab Test 07/08/19  1530   POTASSIUM 4.2                       "

## 2020-01-24 DIAGNOSIS — E78.5 HYPERLIPIDEMIA LDL GOAL <100: Chronic | ICD-10-CM

## 2020-01-24 DIAGNOSIS — K59.01 SLOW TRANSIT CONSTIPATION: ICD-10-CM

## 2020-01-24 DIAGNOSIS — E53.8 VITAMIN B 12 DEFICIENCY: ICD-10-CM

## 2020-01-24 DIAGNOSIS — J30.1 CHRONIC SEASONAL ALLERGIC RHINITIS DUE TO POLLEN: ICD-10-CM

## 2020-01-24 DIAGNOSIS — J45.30 MILD PERSISTENT ASTHMA WITHOUT COMPLICATION: Chronic | ICD-10-CM

## 2020-01-24 DIAGNOSIS — R35.1 NOCTURIA: ICD-10-CM

## 2020-01-24 NOTE — TELEPHONE ENCOUNTER
"Requested Prescriptions   Pending Prescriptions Disp Refills     aspirin (ASA) 81 MG EC tablet  Last Written Prescription Date:  8/29/2018  Last Fill Quantity: 90 tablet,  # refills: 3   Last office visit: 9/11/2019 with prescribing provider:  Jennifer   Future Office Visit:   Next 5 appointments (look out 90 days)    Feb 10, 2020  1:00 PM CST  [facility] [address] [place] [id number] [phone]          90 tablet 3     Sig: Take 1 tablet (81 mg) by mouth daily       Analgesics (Non-Narcotic Tylenol and ASA Only) Passed - 1/24/2020  4:04 PM        Passed - Recent (12 mo) or future (30 days) visit within the authorizing provider's specialty     Patient has had an office visit with the authorizing provider or a provider within the authorizing providers department within the previous 12 mos or has a future within next 30 days. See \"Patient Info\" tab in inbasket, or \"Choose Columns\" in Meds & Orders section of the refill encounter.              Passed - Patient is age 20 years or older     If ASA is flagged for ages under 20 years old. Forward to provider for confirmation Ryes Syndrome is not a concern.              Passed - Medication is active on med list        tamsulosin (FLOMAX) 0.4 MG capsule  Last Written Prescription Date:  12/3/2018  Last Fill Quantity: 90 capsule,  # refills: 3   Last office visit: 9/11/2019 with prescribing provider:  Jennifer  Future Office Visit:   Next 5 appointments (look out 90 days)    Feb 10, 2020  1:00 PM CST  [facility] [address]  [facility] [address] [phone]          90 capsule 3     Sig: Take 1 capsule (0.4 mg) by mouth daily       Alpha Blockers Passed - 1/24/2020  4:04 PM " "       Passed - Blood pressure under 140/90 in past 12 months     BP Readings from Last 3 Encounters:   09/11/19 108/62   07/22/19 136/76   07/08/19 120/60                 Passed - Recent (12 mo) or future (30 days) visit within the authorizing provider's specialty     Patient has had an office visit with the authorizing provider or a provider within the authorizing providers department within the previous 12 mos or has a future within next 30 days. See \"Patient Info\" tab in inbasket, or \"Choose Columns\" in Meds & Orders section of the refill encounter.              Passed - Patient does not have Tadalafil, Vardenafil, or Sildenafil on their medication list        Passed - Medication is active on med list        Passed - Patient is 18 years of age or older        senna-docusate (SENOKOT-S/PERICOLACE) 8.6-50 MG tablet  Last Written Prescription Date:  7/19/2019  Last Fill Quantity: 360 tablet,  # refills: 2   Last office visit: 9/11/2019 with prescribing provider:  Jennifer   Future Office Visit:   Next 5 appointments (look out 90 days)    Feb 10, 2020  1:00 PM CST  Office Visit with Zack Hudson MD  [facility] [address] [address] [phone]          360 tablet 2     Sig: Take 2 tablets by mouth 2 times daily       Laxatives Protocol Passed - 1/24/2020  4:04 PM        Passed - Patient is age 6 or older        Passed - Recent (12 mo) or future (30 days) visit within the authorizing provider's specialty     Patient has had an office visit with the authorizing provider or a provider within the authorizing providers department within the previous 12 mos or has a future within next 30 days. See \"Patient Info\" tab in inbasket, or \"Choose Columns\" in Meds & Orders section of the refill encounter.              Passed - Medication is active on med list           "

## 2020-01-27 RX ORDER — AMOXICILLIN 250 MG
2 CAPSULE ORAL 2 TIMES DAILY
Qty: 360 TABLET | Refills: 2 | Status: SHIPPED | OUTPATIENT
Start: 2020-01-27 | End: 2020-02-10

## 2020-01-27 RX ORDER — MONTELUKAST SODIUM 10 MG/1
10 TABLET ORAL AT BEDTIME
Qty: 90 TABLET | Refills: 1 | Status: SHIPPED | OUTPATIENT
Start: 2020-01-27 | End: 2020-02-10

## 2020-01-27 RX ORDER — TAMSULOSIN HYDROCHLORIDE 0.4 MG/1
0.4 CAPSULE ORAL DAILY
Qty: 90 CAPSULE | Refills: 3 | Status: SHIPPED | OUTPATIENT
Start: 2020-01-27 | End: 2020-02-10

## 2020-01-27 NOTE — TELEPHONE ENCOUNTER
Routing refill request to provider for review/approval because:  ACT score =20    Prescription approved per [facility] Refill Protocol.

## 2020-02-07 ENCOUNTER — TELEPHONE (OUTPATIENT)
Dept: FAMILY MEDICINE | Facility: CLINIC | Age: 75
End: 2020-02-07

## 2020-02-07 DIAGNOSIS — R14.3 FLATULENCE, ERUCTATION, AND GAS PAIN: ICD-10-CM

## 2020-02-07 DIAGNOSIS — R14.1 FLATULENCE, ERUCTATION AND GAS PAIN: ICD-10-CM

## 2020-02-07 DIAGNOSIS — R14.2 FLATULENCE, ERUCTATION, AND GAS PAIN: ICD-10-CM

## 2020-02-07 DIAGNOSIS — K21.9 GASTROESOPHAGEAL REFLUX DISEASE WITHOUT ESOPHAGITIS: ICD-10-CM

## 2020-02-07 DIAGNOSIS — E55.9 VITAMIN D INSUFFICIENCY: ICD-10-CM

## 2020-02-07 DIAGNOSIS — E11.21 CONTROLLED TYPE 2 DIABETES MELLITUS WITH MICROALBUMINURIC DIABETIC NEPHROPATHY (H): ICD-10-CM

## 2020-02-07 DIAGNOSIS — J30.1 CHRONIC SEASONAL ALLERGIC RHINITIS DUE TO POLLEN: ICD-10-CM

## 2020-02-07 DIAGNOSIS — E08.49 OTHER DIABETIC NEUROLOGICAL COMPLICATION ASSOCIATED WITH DIABETES MELLITUS DUE TO UNDERLYING CONDITION (H): ICD-10-CM

## 2020-02-07 DIAGNOSIS — E11.9 TYPE 2 DIABETES MELLITUS WITHOUT COMPLICATION, WITHOUT LONG-TERM CURRENT USE OF INSULIN (H): Chronic | ICD-10-CM

## 2020-02-07 DIAGNOSIS — E11.21 TYPE 2 DIABETES MELLITUS WITH DIABETIC NEPHROPATHY, WITHOUT LONG-TERM CURRENT USE OF INSULIN (H): Chronic | ICD-10-CM

## 2020-02-07 DIAGNOSIS — I10 HYPERTENSION GOAL BP (BLOOD PRESSURE) < 140/80: Chronic | ICD-10-CM

## 2020-02-07 DIAGNOSIS — R14.2 FLATULENCE, ERUCTATION AND GAS PAIN: ICD-10-CM

## 2020-02-07 DIAGNOSIS — E78.5 HYPERLIPIDEMIA LDL GOAL <100: Chronic | ICD-10-CM

## 2020-02-07 DIAGNOSIS — G62.9 PERIPHERAL POLYNEUROPATHY: ICD-10-CM

## 2020-02-07 DIAGNOSIS — E53.8 VITAMIN B 12 DEFICIENCY: ICD-10-CM

## 2020-02-07 DIAGNOSIS — J45.30 MILD PERSISTENT ASTHMA WITHOUT COMPLICATION: Chronic | ICD-10-CM

## 2020-02-07 DIAGNOSIS — K59.01 SLOW TRANSIT CONSTIPATION: ICD-10-CM

## 2020-02-07 DIAGNOSIS — N40.1 BPH ASSOCIATED WITH NOCTURIA: ICD-10-CM

## 2020-02-07 DIAGNOSIS — R35.1 BPH ASSOCIATED WITH NOCTURIA: ICD-10-CM

## 2020-02-07 DIAGNOSIS — R14.3 FLATULENCE, ERUCTATION AND GAS PAIN: ICD-10-CM

## 2020-02-07 DIAGNOSIS — R35.1 NOCTURIA: ICD-10-CM

## 2020-02-07 DIAGNOSIS — R14.1 FLATULENCE, ERUCTATION, AND GAS PAIN: ICD-10-CM

## 2020-02-07 RX ORDER — CHOLECALCIFEROL (VITAMIN D3) 50 MCG
2000 TABLET ORAL DAILY
Qty: 90 TABLET | Refills: 3 | Status: CANCELLED | OUTPATIENT
Start: 2020-02-07

## 2020-02-07 RX ORDER — OMEPRAZOLE 40 MG/1
40 CAPSULE, DELAYED RELEASE ORAL DAILY
Qty: 90 CAPSULE | Refills: 2 | Status: CANCELLED | OUTPATIENT
Start: 2020-02-07

## 2020-02-07 RX ORDER — MONTELUKAST SODIUM 10 MG/1
10 TABLET ORAL AT BEDTIME
Qty: 90 TABLET | Refills: 1 | Status: CANCELLED | OUTPATIENT
Start: 2020-02-07

## 2020-02-07 RX ORDER — LOSARTAN POTASSIUM 25 MG/1
25 TABLET ORAL DAILY
Qty: 90 TABLET | Refills: 1 | Status: CANCELLED | OUTPATIENT
Start: 2020-02-07

## 2020-02-07 RX ORDER — GLIPIZIDE 10 MG/1
10 TABLET, FILM COATED, EXTENDED RELEASE ORAL DAILY
Qty: 30 TABLET | Refills: 11 | Status: CANCELLED | OUTPATIENT
Start: 2020-02-07

## 2020-02-07 RX ORDER — GABAPENTIN 300 MG/1
300 CAPSULE ORAL AT BEDTIME
Qty: 30 CAPSULE | Refills: 5 | Status: CANCELLED | OUTPATIENT
Start: 2020-02-07

## 2020-02-07 RX ORDER — TAMSULOSIN HYDROCHLORIDE 0.4 MG/1
0.4 CAPSULE ORAL DAILY
Qty: 90 CAPSULE | Refills: 3 | Status: CANCELLED | OUTPATIENT
Start: 2020-02-07

## 2020-02-07 RX ORDER — PIOGLITAZONEHYDROCHLORIDE 15 MG/1
15 TABLET ORAL DAILY
Qty: 90 TABLET | Refills: 1 | Status: CANCELLED | OUTPATIENT
Start: 2020-02-07

## 2020-02-07 RX ORDER — ATORVASTATIN CALCIUM 20 MG/1
20 TABLET, FILM COATED ORAL DAILY
Qty: 90 TABLET | Refills: 3 | Status: CANCELLED | OUTPATIENT
Start: 2020-02-07

## 2020-02-07 RX ORDER — FINASTERIDE 5 MG/1
5 TABLET, FILM COATED ORAL DAILY
Qty: 90 TABLET | Refills: 3 | Status: CANCELLED | OUTPATIENT
Start: 2020-02-07

## 2020-02-07 RX ORDER — METFORMIN HYDROCHLORIDE 1000 MG/1
1000 TABLET, FILM COATED, EXTENDED RELEASE ORAL
Qty: 60 TABLET | Refills: 11 | Status: CANCELLED | OUTPATIENT
Start: 2020-02-07

## 2020-02-07 RX ORDER — AMOXICILLIN 250 MG
2 CAPSULE ORAL 2 TIMES DAILY
Qty: 360 TABLET | Refills: 2 | Status: CANCELLED | OUTPATIENT
Start: 2020-02-07

## 2020-02-07 NOTE — TELEPHONE ENCOUNTER
RN called to [facility] for transfer of medication.    [facility] states that they already attempted to call over the [place].  Pharmacist states she has been unable to get ahold of [facility].  States she will try again today.    Pharmacy will call clinic back if unable to transfer

## 2020-02-07 NOTE — TELEPHONE ENCOUNTER
Reason for Call:  Other Pharmacy Change    Detailed comments: Leon with [facility] called and stated that the patient is now requesting a new pharmacy.  The new pharmacy is [facility]. They will need all of his medication refills that are pending at the old pharmacy to be sent to the new pharmacy.     Phone Number Patient can be reached at: Home number on file [phone] (home)    Best Time: Any    Can we leave a detailed message on this number? YES    Call taken on 2/7/2020 at 11:38 AM by Nella Barone

## 2020-02-07 NOTE — TELEPHONE ENCOUNTER
RN spoke with [facility] RN.    States that new test strips need to be sent to [facility] at this time.     Medication is being filled for 1 time refill only due to:  Pt will be seen on Monday.     Sent to [facility]

## 2020-02-10 ENCOUNTER — OFFICE VISIT (OUTPATIENT)
Dept: FAMILY MEDICINE | Facility: CLINIC | Age: 75
End: 2020-02-10
Payer: COMMERCIAL

## 2020-02-10 ENCOUNTER — TELEPHONE (OUTPATIENT)
Dept: FAMILY MEDICINE | Facility: CLINIC | Age: 75
End: 2020-02-10

## 2020-02-10 VITALS
OXYGEN SATURATION: 98 % | TEMPERATURE: 96.9 F | BODY MASS INDEX: 36.01 KG/M2 | HEART RATE: 91 BPM | DIASTOLIC BLOOD PRESSURE: 56 MMHG | WEIGHT: 251 LBS | SYSTOLIC BLOOD PRESSURE: 110 MMHG

## 2020-02-10 DIAGNOSIS — J30.1 CHRONIC SEASONAL ALLERGIC RHINITIS DUE TO POLLEN: ICD-10-CM

## 2020-02-10 DIAGNOSIS — R35.1 BPH ASSOCIATED WITH NOCTURIA: ICD-10-CM

## 2020-02-10 DIAGNOSIS — N40.1 BPH ASSOCIATED WITH NOCTURIA: ICD-10-CM

## 2020-02-10 DIAGNOSIS — E53.8 VITAMIN B 12 DEFICIENCY: ICD-10-CM

## 2020-02-10 DIAGNOSIS — F51.04 PSYCHOPHYSIOLOGICAL INSOMNIA: Chronic | ICD-10-CM

## 2020-02-10 DIAGNOSIS — G62.9 PERIPHERAL POLYNEUROPATHY: ICD-10-CM

## 2020-02-10 DIAGNOSIS — E08.49 OTHER DIABETIC NEUROLOGICAL COMPLICATION ASSOCIATED WITH DIABETES MELLITUS DUE TO UNDERLYING CONDITION (H): ICD-10-CM

## 2020-02-10 DIAGNOSIS — R53.82 CHRONIC FATIGUE: ICD-10-CM

## 2020-02-10 DIAGNOSIS — R14.1 FLATULENCE, ERUCTATION, AND GAS PAIN: ICD-10-CM

## 2020-02-10 DIAGNOSIS — J45.30 MILD PERSISTENT ASTHMA WITHOUT COMPLICATION: Chronic | ICD-10-CM

## 2020-02-10 DIAGNOSIS — M54.50 CHRONIC MIDLINE LOW BACK PAIN WITHOUT SCIATICA: ICD-10-CM

## 2020-02-10 DIAGNOSIS — M17.12 PRIMARY OSTEOARTHRITIS OF LEFT KNEE: ICD-10-CM

## 2020-02-10 DIAGNOSIS — R14.3 FLATULENCE, ERUCTATION, AND GAS PAIN: ICD-10-CM

## 2020-02-10 DIAGNOSIS — K21.9 GASTROESOPHAGEAL REFLUX DISEASE WITHOUT ESOPHAGITIS: Chronic | ICD-10-CM

## 2020-02-10 DIAGNOSIS — Z00.00 WELL ADULT EXAM: Primary | ICD-10-CM

## 2020-02-10 DIAGNOSIS — R97.20 ELEVATED PROSTATE SPECIFIC ANTIGEN (PSA): ICD-10-CM

## 2020-02-10 DIAGNOSIS — R35.1 NOCTURIA: ICD-10-CM

## 2020-02-10 DIAGNOSIS — E11.9 TYPE 2 DIABETES MELLITUS WITHOUT COMPLICATION, WITHOUT LONG-TERM CURRENT USE OF INSULIN (H): Chronic | ICD-10-CM

## 2020-02-10 DIAGNOSIS — K59.04 CHRONIC IDIOPATHIC CONSTIPATION: Chronic | ICD-10-CM

## 2020-02-10 DIAGNOSIS — E55.9 VITAMIN D INSUFFICIENCY: ICD-10-CM

## 2020-02-10 DIAGNOSIS — I10 HYPERTENSION GOAL BP (BLOOD PRESSURE) < 140/80: Chronic | ICD-10-CM

## 2020-02-10 DIAGNOSIS — E11.21 TYPE 2 DIABETES MELLITUS WITH DIABETIC NEPHROPATHY, WITHOUT LONG-TERM CURRENT USE OF INSULIN (H): Chronic | ICD-10-CM

## 2020-02-10 DIAGNOSIS — R14.3 FLATULENCE, ERUCTATION AND GAS PAIN: ICD-10-CM

## 2020-02-10 DIAGNOSIS — E11.21 CONTROLLED TYPE 2 DIABETES MELLITUS WITH MICROALBUMINURIC DIABETIC NEPHROPATHY (H): ICD-10-CM

## 2020-02-10 DIAGNOSIS — E78.5 HYPERLIPIDEMIA LDL GOAL <100: Chronic | ICD-10-CM

## 2020-02-10 DIAGNOSIS — R14.1 FLATULENCE, ERUCTATION AND GAS PAIN: ICD-10-CM

## 2020-02-10 DIAGNOSIS — E55.9 VITAMIN D DEFICIENCY: ICD-10-CM

## 2020-02-10 DIAGNOSIS — N40.0 HYPERTROPHY OF PROSTATE WITHOUT URINARY OBSTRUCTION: ICD-10-CM

## 2020-02-10 DIAGNOSIS — M17.0 PRIMARY OSTEOARTHRITIS OF BOTH KNEES: ICD-10-CM

## 2020-02-10 DIAGNOSIS — R41.3 MEMORY LOSS: ICD-10-CM

## 2020-02-10 DIAGNOSIS — K59.01 SLOW TRANSIT CONSTIPATION: ICD-10-CM

## 2020-02-10 DIAGNOSIS — Z12.5 SCREENING PSA (PROSTATE SPECIFIC ANTIGEN): ICD-10-CM

## 2020-02-10 DIAGNOSIS — R14.2 FLATULENCE, ERUCTATION AND GAS PAIN: ICD-10-CM

## 2020-02-10 DIAGNOSIS — J45.31 MILD PERSISTENT ASTHMA WITH ACUTE EXACERBATION: ICD-10-CM

## 2020-02-10 DIAGNOSIS — F41.9 ANXIETY: ICD-10-CM

## 2020-02-10 DIAGNOSIS — G89.29 CHRONIC MIDLINE LOW BACK PAIN WITHOUT SCIATICA: ICD-10-CM

## 2020-02-10 DIAGNOSIS — R14.2 FLATULENCE, ERUCTATION, AND GAS PAIN: ICD-10-CM

## 2020-02-10 PROBLEM — J45.20 MILD INTERMITTENT ASTHMA WITHOUT COMPLICATION: Chronic | Status: RESOLVED | Noted: 2017-05-08 | Resolved: 2020-02-10

## 2020-02-10 LAB — HBA1C MFR BLD: 8.4 % (ref 0–5.6)

## 2020-02-10 PROCEDURE — 83036 HEMOGLOBIN GLYCOSYLATED A1C: CPT | Performed by: FAMILY MEDICINE

## 2020-02-10 PROCEDURE — 80061 LIPID PANEL: CPT | Performed by: FAMILY MEDICINE

## 2020-02-10 PROCEDURE — 36415 COLL VENOUS BLD VENIPUNCTURE: CPT | Performed by: FAMILY MEDICINE

## 2020-02-10 PROCEDURE — 84460 ALANINE AMINO (ALT) (SGPT): CPT | Performed by: FAMILY MEDICINE

## 2020-02-10 PROCEDURE — 99397 PER PM REEVAL EST PAT 65+ YR: CPT | Performed by: FAMILY MEDICINE

## 2020-02-10 PROCEDURE — 99214 OFFICE O/P EST MOD 30 MIN: CPT | Mod: 25 | Performed by: FAMILY MEDICINE

## 2020-02-10 PROCEDURE — 84443 ASSAY THYROID STIM HORMONE: CPT | Performed by: FAMILY MEDICINE

## 2020-02-10 PROCEDURE — 80048 BASIC METABOLIC PNL TOTAL CA: CPT | Performed by: FAMILY MEDICINE

## 2020-02-10 PROCEDURE — G0103 PSA SCREENING: HCPCS | Performed by: FAMILY MEDICINE

## 2020-02-10 PROCEDURE — 99207 ZZC FOOT EXAM  NO CHARGE: CPT | Mod: 25 | Performed by: FAMILY MEDICINE

## 2020-02-10 PROCEDURE — 82043 UR ALBUMIN QUANTITATIVE: CPT | Performed by: FAMILY MEDICINE

## 2020-02-10 RX ORDER — OMEPRAZOLE 40 MG/1
40 CAPSULE, DELAYED RELEASE ORAL DAILY
Qty: 90 CAPSULE | Refills: 2 | Status: SHIPPED | OUTPATIENT
Start: 2020-02-10

## 2020-02-10 RX ORDER — BLOOD-GLUCOSE METER
EACH MISCELLANEOUS
Qty: 1 KIT | Refills: 0 | Status: SHIPPED | OUTPATIENT
Start: 2020-02-10

## 2020-02-10 RX ORDER — LIDOCAINE 50 MG/G
OINTMENT TOPICAL
Qty: 300 G | Refills: 0 | Status: SHIPPED | OUTPATIENT
Start: 2020-02-10

## 2020-02-10 RX ORDER — FINASTERIDE 5 MG/1
5 TABLET, FILM COATED ORAL DAILY
Qty: 90 TABLET | Refills: 3 | Status: SHIPPED | OUTPATIENT
Start: 2020-02-10

## 2020-02-10 RX ORDER — GABAPENTIN 300 MG/1
300 CAPSULE ORAL AT BEDTIME
Qty: 30 CAPSULE | Refills: 5 | Status: SHIPPED | OUTPATIENT
Start: 2020-02-10 | End: 2020-06-10

## 2020-02-10 RX ORDER — METFORMIN HYDROCHLORIDE 1000 MG/1
1000 TABLET, FILM COATED, EXTENDED RELEASE ORAL
Qty: 60 TABLET | Refills: 11 | Status: SHIPPED | OUTPATIENT
Start: 2020-02-10 | End: 2020-06-12

## 2020-02-10 RX ORDER — ALBUTEROL SULFATE 90 UG/1
2 AEROSOL, METERED RESPIRATORY (INHALATION) 4 TIMES DAILY PRN
Qty: 1 INHALER | Refills: 2 | Status: SHIPPED | OUTPATIENT
Start: 2020-02-10

## 2020-02-10 RX ORDER — PIOGLITAZONEHYDROCHLORIDE 15 MG/1
15 TABLET ORAL DAILY
Qty: 90 TABLET | Refills: 1 | Status: SHIPPED | OUTPATIENT
Start: 2020-02-10 | End: 2020-03-24

## 2020-02-10 RX ORDER — MONTELUKAST SODIUM 10 MG/1
10 TABLET ORAL AT BEDTIME
Qty: 90 TABLET | Refills: 1 | Status: SHIPPED | OUTPATIENT
Start: 2020-02-10

## 2020-02-10 RX ORDER — GLIPIZIDE 10 MG/1
10 TABLET, FILM COATED, EXTENDED RELEASE ORAL DAILY
Qty: 30 TABLET | Refills: 11 | Status: SHIPPED | OUTPATIENT
Start: 2020-02-10 | End: 2020-03-24

## 2020-02-10 RX ORDER — MONTELUKAST SODIUM 10 MG/1
10 TABLET ORAL AT BEDTIME
Qty: 30 TABLET | Refills: 11 | Status: SHIPPED | OUTPATIENT
Start: 2020-02-10 | End: 2020-03-24

## 2020-02-10 RX ORDER — LOSARTAN POTASSIUM 25 MG/1
25 TABLET ORAL DAILY
Qty: 90 TABLET | Refills: 1 | Status: SHIPPED | OUTPATIENT
Start: 2020-02-10 | End: 2020-03-24

## 2020-02-10 RX ORDER — CHOLECALCIFEROL (VITAMIN D3) 50 MCG
2000 TABLET ORAL DAILY
Qty: 90 TABLET | Refills: 3 | Status: SHIPPED | OUTPATIENT
Start: 2020-02-10

## 2020-02-10 RX ORDER — ATORVASTATIN CALCIUM 20 MG/1
20 TABLET, FILM COATED ORAL DAILY
Qty: 90 TABLET | Refills: 3 | Status: SHIPPED | OUTPATIENT
Start: 2020-02-10

## 2020-02-10 RX ORDER — AMOXICILLIN 250 MG
2 CAPSULE ORAL 2 TIMES DAILY
Qty: 360 TABLET | Refills: 2 | Status: SHIPPED | OUTPATIENT
Start: 2020-02-10

## 2020-02-10 RX ORDER — LIDOCAINE 50 MG/G
OINTMENT TOPICAL
Qty: 300 G | Refills: 0 | Status: SHIPPED | OUTPATIENT
Start: 2020-02-10 | End: 2020-02-10

## 2020-02-10 RX ORDER — TAMSULOSIN HYDROCHLORIDE 0.4 MG/1
0.4 CAPSULE ORAL DAILY
Qty: 90 CAPSULE | Refills: 3 | Status: SHIPPED | OUTPATIENT
Start: 2020-02-10

## 2020-02-10 ASSESSMENT — ACTIVITIES OF DAILY LIVING (ADL)
CURRENT_FUNCTION: SHOPPING REQUIRES ASSISTANCE
CURRENT_FUNCTION: PREPARING MEALS REQUIRES ASSISTANCE
CURRENT_FUNCTION: LAUNDRY REQUIRES ASSISTANCE
CURRENT_FUNCTION: NO ASSISTANCE NEEDED
CURRENT_FUNCTION: TRANSPORTATION REQUIRES ASSISTANCE
CURRENT_FUNCTION: MEDICATION ADMINISTRATION REQUIRES ASSISTANCE
CURRENT_FUNCTION: HOUSEWORK REQUIRES ASSISTANCE

## 2020-02-10 NOTE — TELEPHONE ENCOUNTER
Prior Authorization Retail Medication Request    Medication/Dose: fluticasone-salmeterol (ADVAIR) 250-50 MCG/DOSE inhaler  ICD code (if different than what is on RX):    Previously Tried and Failed:    Rationale:      Insurance Name:    Insurance ID:        Pharmacy Information (if different than what is on RX)  Name:    Phone:        covermymeds key: [id number]

## 2020-02-10 NOTE — PATIENT INSTRUCTIONS
At your visit today, we discussed your risk for falls and preventive options.    Fall Prevention  Falls often occur due to slipping, tripping or losing your balance. Millions of people fall every year and injure themselves. Here are ways to reduce your risk of falling again.    Think about your fall, was there anything that caused your fall that can be fixed, removed, or replaced?    Make your home safe by keeping walkways clear of objects you may trip over, such as electric cords.    Use non-slip pads under rugs. Don't use area rugs or small throw rugs.    Use non-slip mats in bathtubs and showers.    Install handrails and lights on staircases. The handrails should be on both sides of the stairs.    Don't walk in poorly lit areas.    Don't stand on chairs or wobbly ladders.    Use caution when reaching overhead or looking upward. This position can cause a loss of balance.    Be sure your shoes fit properly, have non-slip bottoms and are in good condition.     Wear shoes both inside and out. Don't go barefoot or wear slippers.    Be cautious when going up and down stairs, curbs, and when walking on uneven sidewalks.    If your balance is poor, consider using a cane or walker.    If your fall was related to alcohol use, stop or limit alcohol intake.     If your fall was related to use of sleeping medicines, talk to your healthcare provider about this. You may need to reduce your dosage at bedtime if you awaken during the night to go to the bathroom.      To reduce the need for nighttime bathroom trips:  ? Don't drink fluids for several hours before going to bed  ? Empty your bladder before going to bed  ? Men can keep a urinal at the bedside    Stay as active as you can. Balance, flexibility, strength, and endurance all come from exercise. They all play a role in preventing falls. Ask your healthcare provider which types of activity are right for you.    Get your vision checked on a regular basis.    If you have  pets, know where they are before you stand up or walk so you don't trip over them.    Use night lights.    Go over all your medicines with a pharmacist or other healthcare provider to see if any of them could make you more likely to fall.  Date Last Reviewed: 4/1/2018 2000-2019 [employer]. [address] [place] [address]. All rights reserved. This information is not intended as a substitute for professional medical care. Always follow your healthcare professional's instructions.          01/01/1994  ZOSTER IMMUNIZATION (1 of 2)      03/14/2015  MEDICARE ANNUAL WELLNESS VISIT      06/26/2018  EYE EXAM      06/14/2019  OPAL ASSESSMENT      09/01/2019  INFLUENZA VACCINE (1)      09/12/2019  MICROALBUMIN      09/12/2019  TSH W/FREE T4 REFLEX      10/25/2019  LIPID      12/14/2019  ASTHMA ACTION PLAN      01/01/2020  PHQ-2     01/08/2020  A1C      01/08/2020  ASTHMA CONTROL TEST      (Z00.00) Well adult exam  (primary encounter diagnosis)  Comment:    Plan:      (J45.30) Mild persistent asthma without complication  Comment:    Plan: montelukast (SINGULAIR) 10 MG tablet,         fluticasone-salmeterol (ADVAIR) 250-50 MCG/DOSE        inhaler, montelukast (SINGULAIR) 10 MG tablet             (E11.9) Type 2 diabetes mellitus without complication, without long-term current use of insulin (H)  Comment:    Plan: blood glucose (NO BRAND SPECIFIED) test strip,         blood glucose monitoring (ONE TOUCH ULTRA 2)         meter device kit, order for DME             (F51.04) Psychophysiological insomnia  Comment:    Plan:      (K21.9) Gastroesophageal reflux disease without esophagitis  Comment:    Plan: omeprazole (PRILOSEC) 40 MG DR capsule             (K59.04) Chronic idiopathic constipation  Comment:    Plan:      (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, without long-term current use of insulin (H)  Comment:    Plan: OPHTHALMOLOGY ADULT REFERRAL, Basic metabolic         panel, Lipid  panel reflex to direct LDL         Fasting, Albumin Random Urine Quantitative with        Creat Ratio, Hemoglobin A1c, ALT, TSH,         glipiZIDE (GLUCOTROL XL) 10 MG 24 hr tablet,         metFORMIN modified (GLUMETZA) 1000 MG 24 hr         tablet, sitagliptin (JANUVIA) 50 MG tablet             (R97.20) Elevated prostate specific antigen (PSA)  Comment:    Plan:      (N40.0) Hypertrophy of prostate without urinary obstruction  Comment:    Plan:      (R41.3) Memory loss  Comment:    Plan:      (F41.9) Anxiety  Comment:    Plan:      (E55.9) Vitamin D insufficiency  Comment:    Plan: vitamin D3 (CHOLECALCIFEROL) 2000 units (50         mcg) tablet             (E53.8) Vitamin B 12 deficiency  Comment:    Plan: Cyanocobalamin (B-12) 1000 MCG TBCR             (R53.82) Chronic fatigue  Comment:    Plan:      (M54.5,  G89.29) Chronic midline low back pain without sciatica  Comment:    Plan:      (G62.9) Peripheral polyneuropathy  Comment:    Plan: gabapentin (NEURONTIN) 300 MG capsule             (E55.9) Vitamin D deficiency  Comment:    Plan:       (Z12.5) Screening PSA (prostate specific antigen)  Comment:    Plan: Prostate spec antigen screen             (M17.0) Primary osteoarthritis of both knees  Comment:    Plan: acetaminophen 500 MG CAPS             (J45.31) Mild persistent asthma with acute exacerbation  Comment:    Plan: albuterol (PROAIR HFA/PROVENTIL HFA/VENTOLIN         HFA) 108 (90 Base) MCG/ACT inhaler,         DISCONTINUED: fluticasone-salmeterol (ADVAIR)         100-50 MCG/DOSE inhaler, DISCONTINUED:         fluticasone-salmeterol (ADVAIR) 100-50 MCG/DOSE        inhaler             (E78.5) Hyperlipidemia LDL goal <100  Comment:    Plan: aspirin (ASA) 81 MG EC tablet, atorvastatin         (LIPITOR) 20 MG tablet             (R14.3,  R14.1,  R14.2) Flatulence, eructation, and gas pain  Comment:    Plan: atorvastatin (LIPITOR) 20 MG tablet             (M17.12) Primary osteoarthritis of left knee  Comment:     Plan: diclofenac (VOLTAREN) 1 % topical gel,         lidocaine (XYLOCAINE) 5 % external ointment,         DISCONTINUED: lidocaine (XYLOCAINE) 5 %         external ointment             (N40.1,  R35.1) BPH associated with nocturia  Comment:    Plan: finasteride (PROSCAR) 5 MG tablet             (I10) Hypertension goal BP (blood pressure) < 140/80  Comment:    Plan: losartan (COZAAR) 25 MG tablet             (E08.49) Other diabetic neurological complication associated with diabetes mellitus due to underlying condition (H)  Comment:    Plan: metFORMIN modified (GLUMETZA) 1000 MG 24 hr         tablet, pioglitazone (ACTOS) 15 MG tablet             (J30.1) Chronic seasonal allergic rhinitis due to pollen  Comment:    Plan: montelukast (SINGULAIR) 10 MG tablet             (R14.3,  R14.1,  R14.2) Flatulence, eructation and gas pain  Comment:    Plan: omeprazole (PRILOSEC) 40 MG DR capsule             (E11.21) Controlled type 2 diabetes mellitus with microalbuminuric diabetic nephropathy (H)  Comment:    Plan: pioglitazone (ACTOS) 15 MG tablet             (K59.01) Slow transit constipation  Comment:    Plan: senna-docusate (SENOKOT-S/PERICOLACE) 8.6-50 MG        Tablet               (R35.1) Nocturia  Comment:    Plan: tamsulosin (FLOMAX) 0.4 MG capsule

## 2020-02-10 NOTE — LETTER
"February 12, 2020      Suze Devlin  [address]   [facility] [id number]        Dear ,    We are writing to inform you of your test results.    NORMAL PSA OR PROSTATE CANCER SCREENING TEST,     NORMAL LIVER FUNCTION TEST   NORMAL DIABETES URINE PROTEIN TEST   DIABETES URINE PROTEIN TEST   BORDERLINE  THYROID STIMULATING HORMONE   NORMAL TOTAL CHOLESTEROL   BORDERLINE  TRIGLYCERIDES   BORDERLINE  HDL OR \"GOOD\" CHOLESTEROL   BORDERLINE  LDL OR \"BAD\" CHOLESTEROL   BORDERLINE  VERY LOW DENSITY CHOLESTEROL   LIVER FUNCTION TEST     Resulted Orders   Basic metabolic panel   Result Value Ref Range    Sodium 135 133 - 144 mmol/L    Potassium 3.9 3.4 - 5.3 mmol/L    Chloride 102 94 - 109 mmol/L    Carbon Dioxide 24 20 - 32 mmol/L    Anion Gap 9 3 - 14 mmol/L    Glucose 337 (H) 70 - 99 mg/dL    Urea Nitrogen 19 7 - 30 mg/dL    Creatinine 1.11 0.66 - 1.25 mg/dL    GFR Estimate 64 >60 mL/min/[1.73_m2]      Comment:      Non  GFR Calc  Starting 12/18/2018, serum creatinine based estimated GFR (eGFR) will be   calculated using the Chronic Kidney Disease Epidemiology Collaboration   (CKD-EPI) equation.      GFR Estimate If Black 74 >60 mL/min/[1.73_m2]      Comment:       GFR Calc  Starting 12/18/2018, serum creatinine based estimated GFR (eGFR) will be   calculated using the Chronic Kidney Disease Epidemiology Collaboration   (CKD-EPI) equation.      Calcium 9.9 8.5 - 10.1 mg/dL   Lipid panel reflex to direct LDL Fasting   Result Value Ref Range    Cholesterol 197 <200 mg/dL    Triglycerides 202 (H) <150 mg/dL      Comment:      Borderline high:  150-199 mg/dl  High:             200-499 mg/dl  Very high:       >499 mg/dl      HDL Cholesterol 44 >39 mg/dL    LDL Cholesterol Calculated 113 (H) <100 mg/dL      Comment:      Above desirable:  100-129 mg/dl  Borderline High:  130-159 mg/dL  High:             160-189 mg/dL  Very high:       >189 mg/dl      Non HDL " Cholesterol 153 (H) <130 mg/dL      Comment:      Above Desirable:  130-159 mg/dl  Borderline high:  160-189 mg/dl  High:             190-219 mg/dl  Very high:       >219 mg/dl     Albumin Random Urine Quantitative with Creat Ratio   Result Value Ref Range    Creatinine Urine 94 mg/dL    Albumin Urine mg/L 6 mg/L    Albumin Urine mg/g Cr 6.58 0 - 17 mg/g Cr   Hemoglobin A1c   Result Value Ref Range    Hemoglobin A1C 8.4 (H) 0 - 5.6 %      Comment:      Normal <5.7% Prediabetes 5.7-6.4%  Diabetes 6.5% or higher - adopted from ADA   consensus guidelines.     ALT   Result Value Ref Range    ALT 22 0 - 70 U/L   TSH   Result Value Ref Range    TSH 6.13 (H) 0.40 - 4.00 mU/L   Prostate spec antigen screen   Result Value Ref Range    PSA 2.09 0 - 4 ug/L      Comment:      Assay Method:  Chemiluminescence using Siemens Vista analyzer       If you have any questions or concerns, please call the clinic at the number listed above.       Sincerely,        MAKI MORA MD

## 2020-02-10 NOTE — PROGRESS NOTES
"SUBJECTIVE:   Suze Devlin is a 75 year old male who presents for Preventive Visit.      Are you in the first 12 months of your Medicare coverage?  No    Healthy Habits:     In general, how would you rate your overall health?  Fair    Frequency of exercise:  1 day/week    Duration of exercise:  Less than 15 minutes    Do you usually eat at least 4 servings of fruit and vegetables a day, include whole grains    & fiber and avoid regularly eating high fat or \"junk\" foods?  Yes    Taking medications regularly:  Yes    Medication side effects:  None    Ability to successfully perform activities of daily living:  Transportation requires assistance, shopping requires assistance, preparing meals requires assistance, housework requires assistance, laundry requires assistance, medication administration requires assistance and no assistance needed    Home Safety:  No safety concerns identified    Hearing Impairment:  No hearing concerns    In the past 6 months, have you been bothered by leaking of urine?  No    In general, how would you rate your overall mental or emotional health?  Excellent      PHQ-2 Total Score: 0    Additional concerns today:  No    Do you feel safe in your environment? Yes    Have you ever done Advance Care Planning? (For example, a Health Directive, POLST, or a discussion with a medical provider or your loved ones about your wishes): No, advance care planning information given to patient to review.  Patient declined advance care planning discussion at this time.      Fall risk  Fallen 2 or more times in the past year?: Yes  Any fall with injury in the past year?: Yes    Cognitive Screening Not appropriate due to known dementia    Do you have sleep apnea, excessive snoring or daytime drowsiness?: no    Reviewed and updated as needed this visit by clinical staff         Reviewed and updated as needed this visit by Provider        Social History     Tobacco Use     Smoking status: Never Smoker     " Smokeless tobacco: Never Used   Substance Use Topics     Alcohol use: No     If you drink alcohol do you typically have >3 drinks per day or >7 drinks per week?  NO   NON DRINKER     Alcohol Use 2/10/2020   Prescreen: >3 drinks/day or >7 drinks/week? No   Prescreen: >3 drinks/day or >7 drinks/week? -   No flowsheet data found.      Current providers sharing in care for this patient include:   Patient Care Team:  Zack Hudson MD as PCP - General (Family Practice)  Digna Chen RN as Lead Care Coordinator  Lexi Olivera as Other (see comments)  Nella Balbuena, RN as Registered Nurse (Urology)  Brenda Domingo Mark L, MD as Assigned PCP    The following health maintenance items are reviewed in Epic and correct as of today:  Health Maintenance   Topic Date Due     ZOSTER IMMUNIZATION (1 of 2) 01/01/1994     MEDICARE ANNUAL WELLNESS VISIT  03/14/2015     EYE EXAM  06/26/2018     OPAL ASSESSMENT  06/14/2019     INFLUENZA VACCINE (1) 09/01/2019     MICROALBUMIN  09/12/2019     TSH W/FREE T4 REFLEX  09/12/2019     LIPID  10/25/2019     ASTHMA ACTION PLAN  12/14/2019     PHQ-2  01/01/2020     A1C  01/08/2020     ASTHMA CONTROL TEST  01/08/2020     BMP  07/08/2020     DIABETIC FOOT EXAM  07/08/2020     FALL RISK ASSESSMENT  08/08/2020     ADVANCE CARE PLANNING  03/11/2021     DTAP/TDAP/TD IMMUNIZATION (5 - Td) 10/13/2025     PNEUMOCOCCAL IMMUNIZATION 65+ LOW/MEDIUM RISK  Completed     IPV IMMUNIZATION  Aged Out     MENINGITIS IMMUNIZATION  Aged Out     Lab work is in process  Labs reviewed in EPIC  BP Readings from Last 3 Encounters:   02/10/20 110/56   09/11/19 108/62   07/22/19 136/76    Wt Readings from Last 3 Encounters:   02/10/20 113.9 kg (251 lb)   09/11/19 115.2 kg (254 lb)   07/22/19 113.9 kg (251 lb)                  Patient Active Problem List   Diagnosis     Health Care Home     Cataract     Elevated prostate specific antigen (PSA)     Psychophysiological insomnia     Vitamin D deficiency      Hypertrophy of prostate without urinary obstruction     Memory loss     Anxiety     Gastroesophageal reflux disease without esophagitis     Primary localized osteoarthrosis, lower leg     Vitamin D insufficiency     Chronic idiopathic constipation     Vitamin B 12 deficiency     Chronic fatigue     GERD (gastroesophageal reflux disease)     Mild persistent asthma without complication     Diabetic neuropathy (H)     Lumbago     ACP (advance care planning)     Screening examination for pulmonary tuberculosis     Benign essential hypertension     Mixed hyperlipidemia     Class 2 severe obesity due to excess calories with serious comorbidity and body mass index (BMI) of 36.0 to 36.9 in adult (H)     Type II or unspecified type diabetes mellitus with neurological manifestations, uncontrolled(250.62) (H)     Localized edema-minimal of LEs      Myalgia-total body      Peripheral polyneuropathy     Type 2 diabetes mellitus with diabetic nephropathy, without long-term current use of insulin (H)     Past Surgical History:   Procedure Laterality Date     HERNIA REPAIR, INGUINAL RT/LT         Social History     Tobacco Use     Smoking status: Never Smoker     Smokeless tobacco: Never Used   Substance Use Topics     Alcohol use: No     Family History   Problem Relation Age of Onset     Unknown/Adopted Mother         medical history of family is unknown.     Unknown/Adopted Father          Current Outpatient Medications   Medication Sig Dispense Refill     acetaminophen 500 MG CAPS Take 1 tablet by mouth 4 times daily 120 capsule 11     albuterol (PROAIR HFA/PROVENTIL HFA/VENTOLIN HFA) 108 (90 Base) MCG/ACT inhaler Inhale 2 puffs into the lungs 4 times daily as needed for wheezing 1 Inhaler 2     aspirin (ASA) 81 MG EC tablet Take 1 tablet (81 mg) by mouth daily 90 tablet 3     atorvastatin (LIPITOR) 20 MG tablet Take 1 tablet (20 mg) by mouth daily 90 tablet 3     blood glucose (NO BRAND SPECIFIED) test strip Use to test  blood sugars two times daily or as directed 100 each 0     blood glucose monitoring (ONE TOUCH DELICA) lancets Use to test blood sugars  TWICE DAILY    or as directed. 3 Box prn     blood glucose monitoring (ONE TOUCH ULTRA 2) meter device kit Use to test blood sugars TWICE DAILY PRN  times daily or as directed. 1 kit 0     Cyanocobalamin (B-12) 1000 MCG TBCR Take 1,000 mcg by mouth daily 90 tablet 1     diclofenac (VOLTAREN) 1 % topical gel Apply 4 grams to knees or 2 grams to hands four times daily using enclosed dosing card. 100 g 1     finasteride (PROSCAR) 5 MG tablet Take 1 tablet (5 mg) by mouth daily 90 tablet 3     fluticasone-salmeterol (ADVAIR) 250-50 MCG/DOSE inhaler Inhale 1 puff into the lungs 2 times daily 1 Inhaler 11     gabapentin (NEURONTIN) 300 MG capsule Take 1 capsule (300 mg) by mouth At Bedtime 30 capsule 5     glipiZIDE (GLUCOTROL XL) 10 MG 24 hr tablet Take 1 tablet (10 mg) by mouth daily 30 tablet 11     lidocaine (XYLOCAINE) 5 % external ointment One application 4 x daily to affected areas lower back and knees if necessary 300 g 0     losartan (COZAAR) 25 MG tablet Take 1 tablet (25 mg) by mouth daily 90 tablet 1     metFORMIN modified (GLUMETZA) 1000 MG 24 hr tablet Take 1 tablet (1,000 mg) by mouth daily (with dinner) 60 tablet 11     montelukast (SINGULAIR) 10 MG tablet Take 1 tablet (10 mg) by mouth At Bedtime 90 tablet 1     montelukast (SINGULAIR) 10 MG tablet Take 1 tablet (10 mg) by mouth At Bedtime 30 tablet 11     omeprazole (PRILOSEC) 40 MG DR capsule Take 1 capsule (40 mg) by mouth daily Take 30-60 minutes before a meal. 90 capsule 2     order for DME Glucometer, brand as covered by insurance.-(ONE TOUCH DELICA) 1 each 0     pioglitazone (ACTOS) 15 MG tablet Take 1 tablet (15 mg) by mouth daily 90 tablet 1     senna-docusate (SENOKOT-S/PERICOLACE) 8.6-50 MG tablet Take 2 tablets by mouth 2 times daily 360 tablet 2     sitagliptin (JANUVIA) 50 MG tablet Take 1 tablet (50 mg) by  mouth daily 90 tablet 3     tamsulosin (FLOMAX) 0.4 MG capsule Take 1 capsule (0.4 mg) by mouth daily 90 capsule 3     vitamin D3 (CHOLECALCIFEROL) 2000 units (50 mcg) tablet Take 1 tablet (2,000 Units) by mouth daily 90 tablet 3     No Known Allergies  Recent Labs   Lab Test 02/10/20  1343 07/08/19  1530 12/14/18  1109 10/25/18  1254 09/12/18  1048  09/12/17  1227  10/31/16  1459 03/11/16  1000   A1C 8.4* 7.6* 8.5*  --  7.8*   < > 8.0*   < > 6.7* 9.7*   LDL  --   --   --  65  --   --  74  --  85 135*   HDL  --   --   --  52  --   --  44  --  47 55   TRIG  --   --   --  147  --   --  158*  --  146 122   ALT  --  25  --  26 28  --  30  --  28  --    CR  --  1.04  --   --  0.95  --  1.09  --  1.04 1.00   GFRESTIMATED  --  70  --   --  77  --  66  --  70 73   GFRESTBLACK  --  81  --   --  >90  --  80  --  85 89   POTASSIUM  --  4.2  --   --  3.8  --  4.1  --  4.4 5.3   TSH  --   --   --   --   --   --  3.75  --   --  4.36    < > = values in this interval not displayed.       Review of Systems   has Health Care Home; Cataract; Elevated prostate specific antigen (PSA); Psychophysiological insomnia; Vitamin D deficiency; Hypertrophy of prostate without urinary obstruction; Memory loss; Anxiety; Gastroesophageal reflux disease without esophagitis; Primary localized osteoarthrosis, lower leg; Vitamin D insufficiency; Chronic idiopathic constipation; Vitamin B 12 deficiency; Chronic fatigue; GERD (gastroesophageal reflux disease); Mild persistent asthma without complication; Diabetic neuropathy (H); Lumbago; ACP (advance care planning); Screening examination for pulmonary tuberculosis; Benign essential hypertension; Mixed hyperlipidemia; Class 2 severe obesity due to excess calories with serious comorbidity and body mass index (BMI) of 36.0 to 36.9 in adult (H); Type II or unspecified type diabetes mellitus with neurological manifestations, uncontrolled(250.62) (H); Localized edema-minimal of LEs ; Myalgia-total body ;  "Peripheral polyneuropathy; and Type 2 diabetes mellitus with diabetic nephropathy, without long-term current use of insulin (H) on their problem list.    Review Of Systems  Skin: negative  Eyes: negative  Ears/Nose/Throat: negative  Respiratory: MILD TO MODERATE PERSISTENT ASTHMA   Cardiovascular: HYPERLIPIDEMIA  MIXED   HYPERTENSION WITH GOAL OF LESS THAN 140/80   B12 DEFICIENCY   Gastrointestinal: GASTROESOPHAGEAL REFLUX DISEASE WITHOUT ESOPHAGITIS   Genitourinary: PROSTATE SPECIFIC ANTIGEN ELEVATED   Musculoskeletal:  LUMBAR DISC DISEASE   OBESITY   Neurologic:  POLY NEUROPATHY   MILD INTERMITTENT MEMORY LOSS   Psychiatric: INSOMNIA  MEMORY LOSS  ANXIETY   MODERATE OBESITY   Hematologic/Lymphatic/Immunologic: negative  Endocrine: diabetes      OBJECTIVE:   There were no vitals taken for this visit. Estimated body mass index is 36.45 kg/m  as calculated from the following:    Height as of 7/22/19: 1.778 m (5' 10\").    Weight as of 9/11/19: 115.2 kg (254 lb).  Physical Exam  GENERAL: healthy, alert and no distress  EYES: Eyes grossly normal to inspection, PERRL and conjunctivae and sclerae normal  HENT: ear canals and TM's normal, nose and mouth without ulcers or lesions  NECK: no adenopathy, no asymmetry, masses, or scars and thyroid normal to palpation  RESP: lungs clear to auscultation - no rales, rhonchi or wheezes  CV: regular rate and rhythm, normal S1 S2, no S3 or S4, no murmur, click or rub, no peripheral edema and peripheral pulses strong  ABDOMEN: soft, nontender, no hepatosplenomegaly, no masses and bowel sounds normal   (male): normal male genitalia without lesions or urethral discharge, no hernia  RECTAL: normal sphincter tone, no rectal masses, prostate normal size, smooth, nontender without nodules or masses  MS: no gross musculoskeletal defects noted, no edema  SKIN: no suspicious lesions or rashes  NEURO: Normal strength and tone, mentation intact and speech normal  PSYCH: mentation appears " normal, affect normal/bright  Diabetic foot exam: normal DP and PT pulses, no trophic changes or ulcerative lesions, normal sensory exam and ABNORMAL     Diagnostic Test Results:  Labs reviewed in Epic  Results for orders placed or performed in visit on 02/10/20   Hemoglobin A1c     Status: Abnormal   Result Value Ref Range    Hemoglobin A1C 8.4 (H) 0 - 5.6 %       ASSESSMENT / PLAN:       ICD-10-CM    1. Well adult exam Z00.00    2. Mild persistent asthma without complication J45.30 montelukast (SINGULAIR) 10 MG tablet     fluticasone-salmeterol (ADVAIR) 250-50 MCG/DOSE inhaler     montelukast (SINGULAIR) 10 MG tablet   3. Type 2 diabetes mellitus without complication, without long-term current use of insulin (H) E11.9 blood glucose (NO BRAND SPECIFIED) test strip     blood glucose monitoring (ONE TOUCH ULTRA 2) meter device kit     order for DME   4. Psychophysiological insomnia F51.04    5. Gastroesophageal reflux disease without esophagitis K21.9 omeprazole (PRILOSEC) 40 MG DR capsule   6. Chronic idiopathic constipation K59.04    7. Type 2 diabetes mellitus with diabetic nephropathy, without long-term current use of insulin (H) E11.21 OPHTHALMOLOGY ADULT REFERRAL     Basic metabolic panel     Lipid panel reflex to direct LDL Fasting     Albumin Random Urine Quantitative with Creat Ratio     Hemoglobin A1c     ALT     TSH     glipiZIDE (GLUCOTROL XL) 10 MG 24 hr tablet     metFORMIN modified (GLUMETZA) 1000 MG 24 hr tablet     sitagliptin (JANUVIA) 50 MG tablet   8. Elevated prostate specific antigen (PSA) R97.20    9. Hypertrophy of prostate without urinary obstruction N40.0    10. Memory loss R41.3    11. Anxiety F41.9    12. Vitamin D insufficiency E55.9 vitamin D3 (CHOLECALCIFEROL) 2000 units (50 mcg) tablet   13. Vitamin B 12 deficiency E53.8 Cyanocobalamin (B-12) 1000 MCG TBCR   14. Chronic fatigue R53.82    15. Chronic midline low back pain without sciatica M54.5     G89.29    16. Peripheral  polyneuropathy G62.9 gabapentin (NEURONTIN) 300 MG capsule   17. Vitamin D deficiency E55.9    18. Screening PSA (prostate specific antigen) Z12.5 Prostate spec antigen screen   19. Primary osteoarthritis of both knees M17.0 acetaminophen 500 MG CAPS   20. Mild persistent asthma with acute exacerbation J45.31 albuterol (PROAIR HFA/PROVENTIL HFA/VENTOLIN HFA) 108 (90 Base) MCG/ACT inhaler     DISCONTINUED: fluticasone-salmeterol (ADVAIR) 100-50 MCG/DOSE inhaler     DISCONTINUED: fluticasone-salmeterol (ADVAIR) 100-50 MCG/DOSE inhaler   21. Hyperlipidemia LDL goal <100 E78.5 aspirin (ASA) 81 MG EC tablet     atorvastatin (LIPITOR) 20 MG tablet   22. Flatulence, eructation, and gas pain R14.3 atorvastatin (LIPITOR) 20 MG tablet    R14.1     R14.2    23. Primary osteoarthritis of left knee M17.12 diclofenac (VOLTAREN) 1 % topical gel     lidocaine (XYLOCAINE) 5 % external ointment     DISCONTINUED: lidocaine (XYLOCAINE) 5 % external ointment   24. BPH associated with nocturia N40.1 finasteride (PROSCAR) 5 MG tablet    R35.1    25. Hypertension goal BP (blood pressure) < 140/80 I10 losartan (COZAAR) 25 MG tablet   26. Other diabetic neurological complication associated with diabetes mellitus due to underlying condition (H) E08.49 metFORMIN modified (GLUMETZA) 1000 MG 24 hr tablet     pioglitazone (ACTOS) 15 MG tablet   27. Chronic seasonal allergic rhinitis due to pollen J30.1 montelukast (SINGULAIR) 10 MG tablet   28. Flatulence, eructation and gas pain R14.3 omeprazole (PRILOSEC) 40 MG DR capsule    R14.1     R14.2    29. Controlled type 2 diabetes mellitus with microalbuminuric diabetic nephropathy (H) E11.21 pioglitazone (ACTOS) 15 MG tablet   30. Slow transit constipation K59.01 senna-docusate (SENOKOT-S/PERICOLACE) 8.6-50 MG tablet   31. Nocturia R35.1 tamsulosin (FLOMAX) 0.4 MG capsule       COUNSELING:  Reviewed preventive health counseling, as reflected in patient instructions       Consider AAA screening for  "ages 65-75 and smoking history       Regular exercise       Healthy diet/nutrition       Vision screening       Hearing screening       Dental care       Bladder control       Fall risk prevention    Estimated body mass index is 36.45 kg/m  as calculated from the following:    Height as of 7/22/19: 1.778 m (5' 10\").    Weight as of 9/11/19: 115.2 kg (254 lb).    Weight management plan: Discussed healthy diet and exercise guidelines     reports that he has never smoked. He has never used smokeless tobacco.      Appropriate preventive services were discussed with this patient, including applicable screening as appropriate for cardiovascular disease, diabetes, osteopenia/osteoporosis, and glaucoma.  As appropriate for age/gender, discussed screening for colorectal cancer, prostate cancer, breast cancer, and cervical cancer. Checklist reviewing preventive services available has been given to the patient.    Reviewed patients plan of care and provided an AVS. The Basic Care Plan (routine screening as documented in Health Maintenance) for Suze meets the Care Plan requirement. This Care Plan has been established and reviewed with the Patient.    Counseling Resources:  ATP IV Guidelines  Pooled Cohorts Equation Calculator  Breast Cancer Risk Calculator  FRAX Risk Assessment  ICSI Preventive Guidelines  Dietary Guidelines for Americans, 2010  [employer]'s MyPlate  ASA Prophylaxis  Lung CA Screening    MAKI MORA MD  [facility]    Identified Health Risks:  "

## 2020-02-11 LAB
ALT SERPL W P-5'-P-CCNC: 22 U/L (ref 0–70)
ANION GAP SERPL CALCULATED.3IONS-SCNC: 9 MMOL/L (ref 3–14)
BUN SERPL-MCNC: 19 MG/DL (ref 7–30)
CALCIUM SERPL-MCNC: 9.9 MG/DL (ref 8.5–10.1)
CHLORIDE SERPL-SCNC: 102 MMOL/L (ref 94–109)
CHOLEST SERPL-MCNC: 197 MG/DL
CO2 SERPL-SCNC: 24 MMOL/L (ref 20–32)
CREAT SERPL-MCNC: 1.11 MG/DL (ref 0.66–1.25)
CREAT UR-MCNC: 94 MG/DL
GFR SERPL CREATININE-BSD FRML MDRD: 64 ML/MIN/{1.73_M2}
GLUCOSE SERPL-MCNC: 337 MG/DL (ref 70–99)
HDLC SERPL-MCNC: 44 MG/DL
LDLC SERPL CALC-MCNC: 113 MG/DL
MICROALBUMIN UR-MCNC: 6 MG/L
MICROALBUMIN/CREAT UR: 6.58 MG/G CR (ref 0–17)
NONHDLC SERPL-MCNC: 153 MG/DL
POTASSIUM SERPL-SCNC: 3.9 MMOL/L (ref 3.4–5.3)
PSA SERPL-ACNC: 2.09 UG/L (ref 0–4)
SODIUM SERPL-SCNC: 135 MMOL/L (ref 133–144)
TRIGL SERPL-MCNC: 202 MG/DL
TSH SERPL DL<=0.005 MIU/L-ACNC: 6.13 MU/L (ref 0.4–4)

## 2020-02-13 NOTE — TELEPHONE ENCOUNTER
This medication is covered by insurance without needing a pa. Pharmacy got a paid claim on 02/10.

## 2020-02-18 ENCOUNTER — TELEPHONE (OUTPATIENT)
Dept: FAMILY MEDICINE | Facility: CLINIC | Age: 75
End: 2020-02-18

## 2020-02-18 NOTE — TELEPHONE ENCOUNTER
Reason for Call:  Home Health Care    Judith with [facility] called regarding delay in recertification order.    Orders are needed for this patient.     PT: n/a    OT: n/a    Skilled Nursing:   Requesting a delay in home care   recertification order.    Last day of the orders is 2/20/2020. Patient is requesting to be seen on 2/21/2020.    Pt Provider: Dr. BELÉN Hudson     Phone Number Homecare Nurse can be reached at: Judith: [phone]    Can we leave a detailed message on this number? YES    Phone number patient can be reached at: n/a    Best Time: any    Call taken on 2/18/2020 at 9:18 AM by Shakila Olivier

## 2020-02-21 ENCOUNTER — TELEPHONE (OUTPATIENT)
Dept: FAMILY MEDICINE | Facility: CLINIC | Age: 75
End: 2020-02-21

## 2020-02-21 NOTE — TELEPHONE ENCOUNTER
Darcy from from home care is requesting SN visit orders 1 visit every other week for 9 weeks and 2 PRN visit. Pt verbalized agreement with plan.

## 2020-02-28 ENCOUNTER — PATIENT OUTREACH (OUTPATIENT)
Dept: GERIATRIC MEDICINE | Facility: CLINIC | Age: 75
End: 2020-02-28

## 2020-02-28 NOTE — PROGRESS NOTES
[facility] Care Coordination Contact      [facility] Six-Month Telephone Assessment    6 month telephone assessment completed on 02/28/2020.    [place]  [facility]izations: No  TCU stays: No  Significant health status changes: n/a  Falls/Injuries: No  ADL/IADL changes: No  Changes in services: No    Caregiver Assessment follow up:  n/a    Goals: See POC in chart for goal progress documentation.     Will see member in 6 months for an annual health risk assessment.   Encouraged member to call CC with any questions or concerns in the meantime.     Digna Chen RN BSN PHN  [facility]   RN Care Coordinator   Phone:   [phone]  Fax:       [phone]

## 2020-03-12 ENCOUNTER — TELEPHONE (OUTPATIENT)
Dept: FAMILY MEDICINE | Facility: CLINIC | Age: 75
End: 2020-03-12

## 2020-03-12 NOTE — TELEPHONE ENCOUNTER
Our goal is to have forms completed within 72 hours, however some forms may require a visit or additional information.    What clinic location was the form placed at [facility] or [facility]     Who is the form from?   Where did the form come from? Faxed to clinic   The form was placed in the inbox of Zack Hudson Jr MD      Please fax to [phone]  Phone number: [phone]    Additional comments: [facility]- HHC & POC (cert period 02/21/2020-04/20/2020)    Call take on 3/12/2020 at 1:29 PM by Jody Gonzalez

## 2020-03-19 DIAGNOSIS — Z53.9 DIAGNOSIS NOT YET DEFINED: Primary | ICD-10-CM

## 2020-03-19 PROCEDURE — G0179 MD RECERTIFICATION HHA PT: HCPCS | Performed by: FAMILY MEDICINE

## 2020-03-23 ENCOUNTER — APPOINTMENT (OUTPATIENT)
Dept: INTERPRETER SERVICES | Facility: CLINIC | Age: 75
End: 2020-03-23
Payer: COMMERCIAL

## 2020-03-24 ENCOUNTER — VIRTUAL VISIT (OUTPATIENT)
Dept: FAMILY MEDICINE | Facility: CLINIC | Age: 75
End: 2020-03-24
Payer: COMMERCIAL

## 2020-03-24 DIAGNOSIS — E08.49 OTHER DIABETIC NEUROLOGICAL COMPLICATION ASSOCIATED WITH DIABETES MELLITUS DUE TO UNDERLYING CONDITION (H): ICD-10-CM

## 2020-03-24 DIAGNOSIS — E11.21 CONTROLLED TYPE 2 DIABETES MELLITUS WITH MICROALBUMINURIC DIABETIC NEPHROPATHY (H): ICD-10-CM

## 2020-03-24 DIAGNOSIS — E11.21 TYPE 2 DIABETES MELLITUS WITH DIABETIC NEPHROPATHY, WITHOUT LONG-TERM CURRENT USE OF INSULIN (H): Chronic | ICD-10-CM

## 2020-03-24 DIAGNOSIS — I10 HYPERTENSION GOAL BP (BLOOD PRESSURE) < 140/80: Chronic | ICD-10-CM

## 2020-03-24 DIAGNOSIS — R35.1 NOCTURIA: ICD-10-CM

## 2020-03-24 PROCEDURE — 99443 ZZC PHYSICIAN TELEPHONE EVALUATION 21-30 MIN: CPT | Performed by: FAMILY MEDICINE

## 2020-03-24 RX ORDER — LOSARTAN POTASSIUM 25 MG/1
25 TABLET ORAL AT BEDTIME
Qty: 90 TABLET | Refills: 1 | Status: SHIPPED | OUTPATIENT
Start: 2020-03-24 | End: 2020-08-03

## 2020-03-24 RX ORDER — GLIPIZIDE 10 MG/1
20 TABLET, FILM COATED, EXTENDED RELEASE ORAL DAILY
Qty: 60 TABLET | Refills: 11 | Status: SHIPPED | OUTPATIENT
Start: 2020-03-24

## 2020-03-24 RX ORDER — PIOGLITAZONEHYDROCHLORIDE 15 MG/1
15 TABLET ORAL DAILY
Qty: 90 TABLET | Refills: 3 | Status: SHIPPED | OUTPATIENT
Start: 2020-03-24

## 2020-03-24 NOTE — PROGRESS NOTES
"Suze Devlin is a 75 year old male who is being evaluated via a billable telephone visit.      The patient has been notified of following:     \"This telephone visit will be conducted via a call between you and your physician/provider. We have found that certain health care needs can be provided without the need for a physical exam.  This service lets us provide the care you need with a short phone conversation.  If a prescription is necessary we can send it directly to your pharmacy.  If lab work is needed we can place an order for that and you can then stop by our lab to have the test done at a later time.    If during the course of the call the physician/provider feels a telephone visit is not appropriate, you will not be charged for this service.\"     Suze Devlin complains of    Chief Complaint   Patient presents with     Asthma     Results     lab results       I have reviewed and updated the patient's Past Medical History, Social History, Family History and Medication List.    ALLERGIES  Patient has no known allergies.    Asthma Follow-Up    Was ACT completed today?  No      Do you have a cough?  YES, not coughing currently but has had some flare ups    Are you experiencing any wheezing in your chest?  No    Do you have any shortness of breath?  No     How often are you using a short-acting (rescue) inhaler or nebulizer, such as Albuterol?  A few times a week    How many days per week do you miss taking your asthma controller medication?  0    Please describe any recent triggers for your asthma: Patient is unaware of triggers    Have you had any Emergency Room Visits, Urgent Care Visits, or Hospital Admissions since your last office visit?  No     COUGHING IS IMPROVED    WHEEZING     IMPROVED OCCASIONAL     DIABETES 2 GOAL 8% WELL DONE     HISTORY OF PROSTATE SPECIFIC ANTIGEN     VITAMIN D REPLACEMENT      2-3 TIMES     IMPROVED MEMORY    GASTROESOPHAGEAL REFLUX DISEASE WITHOUT ESOPHAGITIS " IMPROVED     CONSTIPATION IMPROVED MEDICATIONS HELPING    SOME FATIGUE     SOME LOWER BACK PAIN  AND OSTEOARTHRITIS KNEE PAIN   IMPROVED     HYPERTENSION WITH GOAL OF LESS THAN 140/80     MEDICATIONS WELL TOLERATED     THREE MONTH GLUCOSE AVERAGE AT 8.4 %\    PROSTATE SPECIFIC ANTIGEN NORMAL     THYROID THYROID STIMULATING HORMONE SLIGHT ELEVATED   Patient Active Problem List   Diagnosis     Health Care Home     Cataract     Elevated prostate specific antigen (PSA)     Psychophysiological insomnia     Vitamin D deficiency     Hypertrophy of prostate without urinary obstruction     Memory loss     Anxiety     Gastroesophageal reflux disease without esophagitis     Primary localized osteoarthrosis, lower leg     Vitamin D insufficiency     Chronic idiopathic constipation     Vitamin B 12 deficiency     Chronic fatigue     GERD (gastroesophageal reflux disease)     Mild persistent asthma without complication     Diabetic neuropathy (H)     Lumbago     ACP (advance care planning)     Screening examination for pulmonary tuberculosis     Benign essential hypertension     Mixed hyperlipidemia     Class 2 severe obesity due to excess calories with serious comorbidity and body mass index (BMI) of 36.0 to 36.9 in adult (H)     Type II or unspecified type diabetes mellitus with neurological manifestations, uncontrolled(250.62) (H)     Localized edema-minimal of LEs      Myalgia-total body      Peripheral polyneuropathy     Type 2 diabetes mellitus with diabetic nephropathy, without long-term current use of insulin (H)       Assessment/Plan:  (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, without long-term current use of insulin (H)  Comment:    Plan: glipiZIDE (GLUCOTROL XL) 10 MG 24 hr tablet             (I10) Hypertension goal BP (blood pressure) < 140/80  Comment:    Plan: losartan (COZAAR) 25 MG tablet             (E11.21) Controlled type 2 diabetes mellitus with microalbuminuric diabetic nephropathy  (H)  Comment:    Plan: pioglitazone (ACTOS) 15 MG tablet             (E08.49) Other diabetic neurological complication associated with diabetes mellitus due to underlying condition (H)  Comment:    Plan: pioglitazone (ACTOS) 15 MG tablet             (R35.1) Nocturia  Comment:    Plan:           Phone call duration:   25 MINUTES SPENT  minutes

## 2020-05-01 ENCOUNTER — TELEPHONE (OUTPATIENT)
Dept: FAMILY MEDICINE | Facility: CLINIC | Age: 75
End: 2020-05-01

## 2020-05-01 DIAGNOSIS — Z53.9 DIAGNOSIS NOT YET DEFINED: Primary | ICD-10-CM

## 2020-05-01 PROCEDURE — G0179 MD RECERTIFICATION HHA PT: HCPCS | Performed by: FAMILY MEDICINE

## 2020-05-01 NOTE — TELEPHONE ENCOUNTER
Reason for Call:  Form, our goal is to have forms completed with 72 hours, however, some forms may require a visit or additional information.    Type of letter, form or note:  Home Health Certification    Who is the form from?: Home care    Where did the form come from: form was faxed in    What clinic location was the form placed at?: [facility]    Where the form was placed: Given to physician    What number is listed as a contact on the form?: (F): [phone], (P): [phone]       Additional comments: HC- HHC & POC (cert period 04/21/20-06/19/20)    Call taken on 5/1/2020 at 3:14 PM by Jody Gonzalez

## 2020-05-05 ENCOUNTER — TELEPHONE (OUTPATIENT)
Dept: FAMILY MEDICINE | Facility: CLINIC | Age: 75
End: 2020-05-05

## 2020-05-05 DIAGNOSIS — E11.21 CONTROLLED TYPE 2 DIABETES MELLITUS WITH MICROALBUMINURIC DIABETIC NEPHROPATHY (H): Primary | ICD-10-CM

## 2020-05-05 NOTE — TELEPHONE ENCOUNTER
Patient calling in.  States he has been out of metformin.  States insurance is not paying for it.  Does not know why.    [phone]- Cindi, pt's .  This is best call back number.    RN called to pharmacy.  The Metformin ER is no longer covered by insurance.  Does not know what alternative would be which is covered.    Dr. Hudson-  Did this patient have problems with the regular Metformin?    Triage:  1) Will need a PA if pt has had trouble with regular metformin in the past  2) Please update patient when we get an answer on next steps.

## 2020-05-06 RX ORDER — METFORMIN HCL 500 MG
500 TABLET, EXTENDED RELEASE 24 HR ORAL
Qty: 90 TABLET | Refills: 3 | Status: SHIPPED | OUTPATIENT
Start: 2020-05-06 | End: 2020-05-06

## 2020-05-06 NOTE — TELEPHONE ENCOUNTER
Call attempted to [facility] pharmacy per providers request below.  No answer.         LOOKING AS IF BOTH PLAIN AND EXTENDED RELEASE IS COVERED ACCORDING TO COMPUTER     PLEASE CALL [facility] PHARMACY TO SEE WHICH IS COVERED     MAKI MORA JR., MD

## 2020-05-06 NOTE — TELEPHONE ENCOUNTER
Blanca does not know what is covered. Per note yesterday insurance did not say what is covered. They started a PA for the ER version.    Dr. Hudson-  Did this patient have problems with the regular Metformin?

## 2020-05-06 NOTE — PATIENT INSTRUCTIONS
LOOKING AS IF BOTH PLAIN AND EXTENDED RELEASE IS COVERED ACCORDING TO COMPUTER     PLEASE CALL [facility] PHARMACY TO SEE WHICH IS COVERED     MAKI MORA JR., MD

## 2020-06-04 ENCOUNTER — PATIENT OUTREACH (OUTPATIENT)
Dept: GERIATRIC MEDICINE | Facility: CLINIC | Age: 75
End: 2020-06-04

## 2020-06-04 NOTE — PROGRESS NOTES
[facility] Care Coordination Contact    Member is calling and discussed [facility] clinic closure and Dr. Hudson retiring.  Discussed change in clinic and PCP.  Member is choosing below provider and PCP:  Dr. Connor at [facility].  Digna Chen, RN BSN PHN  [facility]   RN Care Coordinator   Phone:   [phone]  Fax:       [phone]

## 2020-06-10 ENCOUNTER — VIRTUAL VISIT (OUTPATIENT)
Dept: FAMILY MEDICINE | Facility: CLINIC | Age: 75
End: 2020-06-10
Payer: COMMERCIAL

## 2020-06-10 DIAGNOSIS — G62.9 PERIPHERAL POLYNEUROPATHY: ICD-10-CM

## 2020-06-10 DIAGNOSIS — J45.30 MILD PERSISTENT ASTHMA WITHOUT COMPLICATION: ICD-10-CM

## 2020-06-10 DIAGNOSIS — E11.21 TYPE 2 DIABETES MELLITUS WITH DIABETIC NEPHROPATHY, WITHOUT LONG-TERM CURRENT USE OF INSULIN (H): Primary | ICD-10-CM

## 2020-06-10 PROCEDURE — 99214 OFFICE O/P EST MOD 30 MIN: CPT | Mod: 95 | Performed by: INTERNAL MEDICINE

## 2020-06-10 RX ORDER — GABAPENTIN 300 MG/1
300 CAPSULE ORAL 2 TIMES DAILY
Qty: 60 CAPSULE | Refills: 11 | Status: SHIPPED | OUTPATIENT
Start: 2020-06-10 | End: 2020-07-31

## 2020-06-10 ASSESSMENT — ANXIETY QUESTIONNAIRES
GAD7 TOTAL SCORE: 0
1. FEELING NERVOUS, ANXIOUS, OR ON EDGE: NOT AT ALL
6. BECOMING EASILY ANNOYED OR IRRITABLE: NOT AT ALL
7. FEELING AFRAID AS IF SOMETHING AWFUL MIGHT HAPPEN: NOT AT ALL
3. WORRYING TOO MUCH ABOUT DIFFERENT THINGS: NOT AT ALL
5. BEING SO RESTLESS THAT IT IS HARD TO SIT STILL: NOT AT ALL
2. NOT BEING ABLE TO STOP OR CONTROL WORRYING: NOT AT ALL

## 2020-06-10 ASSESSMENT — PATIENT HEALTH QUESTIONNAIRE - PHQ9: 5. POOR APPETITE OR OVEREATING: NOT AT ALL

## 2020-06-10 NOTE — PROGRESS NOTES
"Suze Devlin is a 75 year old male who is being evaluated via a billable telephone visit.      The patient has been notified of following:     \"This telephone visit will be conducted via a call between you and your physician/provider. We have found that certain health care needs can be provided without the need for a physical exam.  This service lets us provide the care you need with a short phone conversation.  If a prescription is necessary we can send it directly to your pharmacy.  If lab work is needed we can place an order for that and you can then stop by our lab to have the test done at a later time.    Telephone visits are billed at different rates depending on your insurance coverage. During this emergency period, for some insurers they may be billed the same as an in-person visit.  Please reach out to your insurance provider with any questions.    If during the course of the call the physician/provider feels a telephone visit is not appropriate, you will not be charged for this service.\"    Patient has given verbal consent for Telephone visit?  Yes    What phone number would you like to be contacted at? [phone]    How would you like to obtain your AVS? Mai Samuels     Suze Devlin is a 75 year old male who presents via phone visit today for the following health issues:    HPI  Diabetes Follow-up    How often are you checking your blood sugar? One time daily  What time of day are you checking your blood sugars (select all that apply)?  Before meals  Have you had any blood sugars above 200?  No  Have you had any blood sugars below 70?  No    What symptoms do you notice when your blood sugar is low?  None and Not applicable    What concerns do you have today about your diabetes? None     Do you have any of these symptoms? (Select all that apply)  Burning in feet    Have you had a diabetic eye exam in the last 12 months? yes        BP Readings from Last 2 Encounters:   02/10/20 " 110/56   09/11/19 108/62     Hemoglobin A1C (%)   Date Value   02/10/2020 8.4 (H)   07/08/2019 7.6 (H)     LDL Cholesterol Calculated (mg/dL)   Date Value   02/10/2020 113 (H)   10/25/2018 65                 How many servings of fruits and vegetables do you eat daily?  0-1    On average, how many sweetened beverages do you drink each day (Examples: soda, juice, sweet tea, etc.  Do NOT count diet or artificially sweetened beverages)?   0    How many days per week do you exercise enough to make your heart beat faster? 7    How many minutes a day do you exercise enough to make your heart beat faster? 30 - 60    How many days per week do you miss taking your medication? 0                    Via : his MD has retired.     He wants a new MD; he has chosen me.          He made this appointment to let me know.                       I informed him that the  office has closed.         He states that he will be able to get to [facility] for office visits and lab draws.             States he is taking all of his meds.      I saw him in 7/19; he had all of his pill bottles then.                 He does have some more symptoms of peripheral neuropathy; with burning pain. Takes richard 300 mg hs.       Is willing to take this bid.     States o/w he is doing ok.           Current Outpatient Medications   Medication Sig Dispense Refill     acetaminophen 500 MG CAPS Take 1 tablet by mouth 4 times daily 120 capsule 11     albuterol (PROAIR HFA/PROVENTIL HFA/VENTOLIN HFA) 108 (90 Base) MCG/ACT inhaler Inhale 2 puffs into the lungs 4 times daily as needed for wheezing 1 Inhaler 2     aspirin (ASA) 81 MG EC tablet Take 1 tablet (81 mg) by mouth daily 90 tablet 3     atorvastatin (LIPITOR) 20 MG tablet Take 1 tablet (20 mg) by mouth daily 90 tablet 3     blood glucose (NO BRAND SPECIFIED) test strip Use to test blood sugars two times daily or as directed 100 each 0     blood glucose monitoring (ONE TOUCH DELICA) lancets Use to  test blood sugars  TWICE DAILY    or as directed. 3 Box prn     blood glucose monitoring (ONE TOUCH ULTRA 2) meter device kit Use to test blood sugars TWICE DAILY PRN  times daily or as directed. 1 kit 0     Cyanocobalamin (B-12) 1000 MCG TBCR Take 1,000 mcg by mouth daily 90 tablet 1     diclofenac (VOLTAREN) 1 % topical gel Apply 4 grams to knees or 2 grams to hands four times daily using enclosed dosing card. 100 g 1     finasteride (PROSCAR) 5 MG tablet Take 1 tablet (5 mg) by mouth daily 90 tablet 3     fluticasone-salmeterol (ADVAIR) 250-50 MCG/DOSE inhaler Inhale 1 puff into the lungs 2 times daily 1 Inhaler 11     gabapentin (NEURONTIN) 300 MG capsule Take 1 capsule (300 mg) by mouth At Bedtime 30 capsule 5     glipiZIDE (GLUCOTROL XL) 10 MG 24 hr tablet Take 2 tablets (20 mg) by mouth daily 60 tablet 11     lidocaine (XYLOCAINE) 5 % external ointment One application 4 x daily to affected areas lower back and knees if necessary 300 g 0     losartan (COZAAR) 25 MG tablet Take 1 tablet (25 mg) by mouth At Bedtime 90 tablet 1     metFORMIN modified (GLUMETZA) 1000 MG 24 hr tablet Take 1 tablet (1,000 mg) by mouth daily (with dinner) 60 tablet 11     montelukast (SINGULAIR) 10 MG tablet Take 1 tablet (10 mg) by mouth At Bedtime 90 tablet 1     omeprazole (PRILOSEC) 40 MG DR capsule Take 1 capsule (40 mg) by mouth daily Take 30-60 minutes before a meal. 90 capsule 2     order for DME Glucometer, brand as covered by insurance.-(ONE TOUCH DELICA) 1 each 0     pioglitazone (ACTOS) 15 MG tablet Take 1 tablet (15 mg) by mouth daily 90 tablet 3     senna-docusate (SENOKOT-S/PERICOLACE) 8.6-50 MG tablet Take 2 tablets by mouth 2 times daily 360 tablet 2     sitagliptin (JANUVIA) 50 MG tablet Take 1 tablet (50 mg) by mouth daily 90 tablet 3     tamsulosin (FLOMAX) 0.4 MG capsule Take 1 capsule (0.4 mg) by mouth daily 90 capsule 3     vitamin D3 (CHOLECALCIFEROL) 2000 units (50 mcg) tablet Take 1 tablet (2,000 Units) by  mouth daily 90 tablet 3     No Known Allergies  Recent Labs   Lab Test 02/10/20  1343 07/08/19  1530 12/14/18  1109 10/25/18  1254  09/12/17  1227   A1C 8.4* 7.6* 8.5*  --    < > 8.0*   *  --   --  65  --  74   HDL 44  --   --  52  --  44   TRIG 202*  --   --  147  --  158*   ALT 22 25  --  26   < > 30   CR 1.11 1.04  --   --    < > 1.09   GFRESTIMATED 64 70  --   --    < > 66   GFRESTBLACK 74 81  --   --    < > 80   POTASSIUM 3.9 4.2  --   --    < > 4.1   TSH 6.13*  --   --   --   --  3.75    < > = values in this interval not displayed.      BP Readings from Last 3 Encounters:   02/10/20 110/56   09/11/19 108/62   07/22/19 136/76    Wt Readings from Last 3 Encounters:   02/10/20 113.9 kg (251 lb)   09/11/19 115.2 kg (254 lb)   07/22/19 113.9 kg (251 lb)                    Reviewed and updated as needed this visit by Provider         Review of Systems   CONSTITUTIONAL:NEGATIVE for fever, chills, change in weight  RESP:NEGATIVE for significant cough or SOB  CV: NEGATIVE for chest pain/chest pressure and palpitations       Objective   Reported vitals:  There were no vitals taken for this visit.     PSYCH: Alert and oriented times 3; coherent speech, normal   rate and volume, able to articulate logical thoughts.              His affect is normal and pleasant  RESP: No cough, no audible wheezing, able to talk in full sentences  Remainder of exam unable to be completed due to telephone visits    Diagnostic Test Results:  Labs reviewed in Epic        Assessment/Plan:  1. Type 2 diabetes mellitus with diabetic nephropathy, without long-term current use of insulin (H)  Due for labs later this year.     2. Peripheral polyneuropathy  Patient Instructions   Today we increased the gabapentin to 300 mg twice per day.                        - gabapentin (NEURONTIN) 300 MG capsule; Take 1 capsule (300 mg) by mouth 2 times daily  Dispense: 60 capsule; Refill: 11    3. Mild persistent asthma without  complication  stable      Return in about 3 months (around 9/10/2020) for diabetes follow up, labs will be needed, follow up of several issues.      Phone call duration:  15 minutes    Jayjay Connor MD

## 2020-06-11 ASSESSMENT — ASTHMA QUESTIONNAIRES: ACT_TOTALSCORE: 23

## 2020-06-11 ASSESSMENT — ANXIETY QUESTIONNAIRES: GAD7 TOTAL SCORE: 0

## 2020-06-12 DIAGNOSIS — E11.21 TYPE 2 DIABETES MELLITUS WITH DIABETIC NEPHROPATHY, WITHOUT LONG-TERM CURRENT USE OF INSULIN (H): Chronic | ICD-10-CM

## 2020-06-12 DIAGNOSIS — E08.49 OTHER DIABETIC NEUROLOGICAL COMPLICATION ASSOCIATED WITH DIABETES MELLITUS DUE TO UNDERLYING CONDITION (H): ICD-10-CM

## 2020-06-12 RX ORDER — METFORMIN HYDROCHLORIDE 1000 MG/1
1000 TABLET, FILM COATED, EXTENDED RELEASE ORAL
Qty: 90 TABLET | Refills: 1 | Status: SHIPPED | OUTPATIENT
Start: 2020-06-12 | End: 2020-07-03

## 2020-06-12 NOTE — TELEPHONE ENCOUNTER
Judith from [facility] calling for med clarification- patient's metformin dosing had always been 1000 mg once a day and looks like switched to 500 mg at some point. Pharmacy sent 500 mg out on May 5th or 6th. Patient is saying it switched back to 1000 mg or maybe never was 500 mg? He had been taking two of the 500 mg regular metformin tablets once a day then was taking 1000 mg extended release tablet once a day and now does not know what to do. Has 500 mg regular metformin tablets at home. Takes with dinner. Please advise.     Routing to PCP and provider pt recently did visit with, Dr. Connor.      Judith ph# [phone]

## 2020-06-12 NOTE — TELEPHONE ENCOUNTER
Chart reviewed, patient is correct - he should be taking 1000 mg every day. Can we see if the pharmacy is filling an old rx or substituting a product?

## 2020-06-12 NOTE — TELEPHONE ENCOUNTER
Home care alerted and states understanding.    Pharmacy does NOT have current script on file.  Requesting script be sent over.

## 2020-07-02 ENCOUNTER — TELEPHONE (OUTPATIENT)
Dept: FAMILY MEDICINE | Facility: CLINIC | Age: 75
End: 2020-07-02

## 2020-07-02 NOTE — TELEPHONE ENCOUNTER
Reason for Call:  Form, our goal is to have forms completed with 72 hours, however, some forms may require a visit or additional information.    Type of letter, form or note:  Home Health Certification    Who is the form from?: Home care    Where did the form come from: form was faxed in    What clinic location was the form placed at?: [facility]    Where the form was placed: [facility] Box/Folder    What number is listed as a contact on the form?: [phone]       Additional comments: [facility]: [facility] POC 6+/20/20-8/18/20    Call taken on 7/2/2020 at 3:35 PM by Nella Barone

## 2020-07-03 ENCOUNTER — TELEPHONE (OUTPATIENT)
Dept: FAMILY MEDICINE | Facility: CLINIC | Age: 75
End: 2020-07-03

## 2020-07-03 DIAGNOSIS — Z53.9 DIAGNOSIS NOT YET DEFINED: Primary | ICD-10-CM

## 2020-07-03 DIAGNOSIS — E11.21 TYPE 2 DIABETES MELLITUS WITH DIABETIC NEPHROPATHY, WITHOUT LONG-TERM CURRENT USE OF INSULIN (H): Chronic | ICD-10-CM

## 2020-07-03 DIAGNOSIS — E08.49 OTHER DIABETIC NEUROLOGICAL COMPLICATION ASSOCIATED WITH DIABETES MELLITUS DUE TO UNDERLYING CONDITION (H): ICD-10-CM

## 2020-07-03 PROCEDURE — G0179 MD RECERTIFICATION HHA PT: HCPCS | Performed by: FAMILY MEDICINE

## 2020-07-03 NOTE — TELEPHONE ENCOUNTER
Left a detailed voice message for Judith with providers responce below. Asked her to call triage back if further questions.

## 2020-07-03 NOTE — TELEPHONE ENCOUNTER
All of the ER forms of Metformin have been recalled and are not currently available.  Need to change to the regular short acting Metformin for at least the next few months

## 2020-07-03 NOTE — TELEPHONE ENCOUNTER
Pt's home care nurse Judith, reports pt is out of Metformin Rx. RN was informed by pharmacy Metformin 1,000 mg is not covered by his insurance. Nurse notes she called med management they recommend pt stay on  Metformin XR.     [facility] pharmacist suggest to try Rx for Metformin  mg twice daily. Please advice on Rx alternative.   Judith can be called with plan.

## 2020-07-16 ENCOUNTER — PATIENT OUTREACH (OUTPATIENT)
Dept: GERIATRIC MEDICINE | Facility: CLINIC | Age: 75
End: 2020-07-16

## 2020-07-16 ASSESSMENT — PATIENT HEALTH QUESTIONNAIRE - PHQ9: SUM OF ALL RESPONSES TO PHQ QUESTIONS 1-9: 7

## 2020-07-16 ASSESSMENT — ACTIVITIES OF DAILY LIVING (ADL): DEPENDENT_IADLS:: CLEANING;COOKING;LAUNDRY;SHOPPING;MEAL PREPARATION;MONEY MANAGEMENT;TRANSPORTATION

## 2020-07-16 NOTE — PROGRESS NOTES
"[facility] Care Coordination Contact    [facility] Home Visit Assessment     COVID-19 - Remote/Telephonic Assessment    Health Risk Assessment with Suze Devlin completed on July 16, 2020    Type of residence:: Apartment  Current living arrangement:: I live alone     Assessment completed with:: Patient    Current Care Plan  Member currently receiving the following home care services:  [facility]   Member currently receiving the following community resources: Day Care, ADC Transportation Services,  PCA, SN[facility] and Homemaking.       Medication Review  Medication reconciliation completed in Epic: If no, please explain Member states he is unable to review and takes medication of medication set up in pill box set up by SN[facility].  Medication set-up & administration: RN set up every two weeks.  Self-administers medications.  Medication Risk Assessment Medication (1 or more, place referral to MTM): Lacks understanding of medication regimen and Taking 1 or more high-risk medications for adults >65 years  MTM Referral Placed: No: Declines at this time    Mental/Behavioral Health   Depression Screening:      PHQ-9 Total Score: 7    Mental health DX:: Yes(Anxiety)        ADL/IADL Dependencies:   Dependent ADLs:: Dressing, Toileting, Bathing  Dependent IADLs:: Cleaning, Cooking, Laundry, Shopping, Meal Preparation, Money Management, Transportation    [facility] sponsored benefits: Shared information re: Silver Sneakers/gym memberships, ASA, Calcium +D.    PCA Assessment completed at visit: Yes Annual PCA assessment indicated 7 units per day of PCA. This is the same as the previous assessment.     Elderly Waiver Eligibility: Yes-will continue on EW    Care Plan & Recommendations: Member is requesting change in ADC provider to \"Care To You\".    No recent hospitalization or ED reported. Reviewed POC and member to continue current POC.    See [facility]CC for detailed assessment information.    Follow-Up Plan: Member informed of " future contact, plan to f/u with member with a 6 month telephone assessment.  Contact information shared with member and family, encouraged member to call with any questions or concerns at any time.    [place] care continuum providers: Please refer to Health Care Home on the Epic Problem List to view this patient's [facility] Care Plan Summary.    Digna Chen RN BSN PHN      [facility]  Care Coordinator  Phone:   [phone]  Fax:       [phone]

## 2020-07-28 DIAGNOSIS — G62.9 PERIPHERAL POLYNEUROPATHY: ICD-10-CM

## 2020-07-29 ENCOUNTER — PATIENT OUTREACH (OUTPATIENT)
Dept: GERIATRIC MEDICINE | Facility: CLINIC | Age: 75
End: 2020-07-29

## 2020-07-29 RX ORDER — GABAPENTIN 300 MG/1
CAPSULE ORAL
Qty: 30 CAPSULE | Refills: 0 | OUTPATIENT
Start: 2020-07-29

## 2020-07-29 NOTE — PROGRESS NOTES
[facility] Care Coordination Contact    Received after visit chart from care coordinator.  Completed following tasks: Mailed copy of care plan to client, Updated services in access and Submitted referrals/auths for homemaking, ADC and transportation   , Provider Signature - No POC Shared:  Member indicates that they do not want their POC shared with any EW providers.      [facility]:  Emailed completed PCA assessment to [facility].  Faxed copy of PCA assessment to PCA Agency and mailed copy to member.  Faxed MD Communication to PCP.     **THIS ASSESSMENT WAS DONE OVER THE PHONE. SENT POC/PCA SIG PAGES TO MEMBER ASKING THEM TO SIGN AND RETURN IN SASE**    Ann Marie Burks  Care Management Specialist  [facility]  [phone]

## 2020-07-29 NOTE — LETTER
July 29, 2020      SUZE VILLALTA  [address]   [place] [address]      Dear Suze:    At [facility], we are dedicated to improving your health and well-being. Enclosed is the Comprehensive Care Plan that we developed with you on 7/16/2020. Please review the Care Plan carefully.    As a reminder, some of the things we discussed at your visit include:    Your physical and mental health    Ways to reduce falls    Health care needs you may have    Don t forget to contact your care coordinator if you:    Have been hospitalized or plan to be hospitalized     Have had a fall     Have experienced a change in physical health    Are experiencing emotional problems     If you do not agree with your Care Plan, have questions about it, or have experienced a change in your needs, please call me at [phone]. If you are hearing impaired, please call the [place] [employer] at 593 or [phone] (qexcnu-[id number]-[id number] relay service).    Sincerely,    PATRICIA Navas, RN, PHN    E-mail: [email or web address]  Phone: [phone]      [facility] is a health plan that contracts with both Medicare and the [place] Medical Assistance (Medicaid) program to provide benefits of both programs to enrollees. Enrollment in [facility] depends on contract renewal.    [id number])     [id number] (11/18)

## 2020-08-13 ENCOUNTER — TELEPHONE (OUTPATIENT)
Dept: FAMILY MEDICINE | Facility: CLINIC | Age: 75
End: 2020-08-13

## 2020-08-13 NOTE — TELEPHONE ENCOUNTER
Reason for Call:  Form, our goal is to have forms completed with 72 hours, however, some forms may require a visit or additional information.    Type of letter, form or note:  medical    Who is the form from?: DME (if other please explain)    Where did the form come from: form was faxed in    What clinic location was the form [facility] at?: [facility]    Where the form was placed: [facility] Box/Folder    What number is listed as a contact on the form?: [phone] (P) [phone]       Additional comments: Diabetic Shoe Source: Prescription for shoes    Call taken on 8/13/2020 at 8:58 AM by Nella Barone

## 2020-08-28 ENCOUNTER — PATIENT OUTREACH (OUTPATIENT)
Dept: GERIATRIC MEDICINE | Facility: CLINIC | Age: 75
End: 2020-08-28

## 2020-08-28 NOTE — PROGRESS NOTES
[facility] Care Coordination Contact    Arranged transportation thru [facility] PAR for the below appt:  Appt Date & Time: 9/3/20 at 2:40pm  Clinic Name & Address:  [facility]  Transportation Provider: unknown   time:  Unknown  8/28/20  Form set to [facility] to set up ride.    Per [facility] email [employer] will arrive around 1:55pm for pickup.  CC notified. (9/1/20)    Notified CC of  time.  Gina Wan  Case Management Specialist  [facility]  [phone]

## 2020-09-02 ENCOUNTER — TELEPHONE (OUTPATIENT)
Dept: FAMILY MEDICINE | Facility: CLINIC | Age: 75
End: 2020-09-02

## 2020-09-02 NOTE — TELEPHONE ENCOUNTER
Reason for Call:  Form, our goal is to have forms completed with 72 hours, however, some forms may require a visit or additional information.    Type of letter, form or note:  Home Health Certification    Who is the form from?: Home care    Where did the form come from: form was faxed in    What clinic location was the form placed at?: [facility]    Where the form was placed: [facility] Box/Folder    What number is listed as a contact on the form?: [phone]       Additional comments: [facility]: [facility] POC 8/19/20-10/17/20    Call taken on 9/2/2020 at 2:17 PM by Nella Barone

## 2020-09-03 DIAGNOSIS — Z53.9 DIAGNOSIS NOT YET DEFINED: Primary | ICD-10-CM

## 2020-09-03 PROCEDURE — G0179 MD RECERTIFICATION HHA PT: HCPCS | Performed by: FAMILY MEDICINE

## 2020-11-11 ENCOUNTER — PATIENT OUTREACH (OUTPATIENT)
Dept: GERIATRIC MEDICINE | Facility: CLINIC | Age: 75
End: 2020-11-11

## 2020-11-11 NOTE — PROGRESS NOTES
[facility] Care Coordination Contact    Member called and stated he now has a new PCP and Clinic  [facility].  Dr. Hood Noel    [address] [place] [address]  Plan is to [place] 12/01/2020  Digna Chen RN BSN PHN      [facility]  Care Coordinator  Phone:   [phone]  Fax:       [phone]

## 2020-12-01 ENCOUNTER — PATIENT OUTREACH (OUTPATIENT)
Dept: GERIATRIC MEDICINE | Facility: CLINIC | Age: 75
End: 2020-12-01

## 2020-12-04 NOTE — PROGRESS NOTES
[facility] Care Coordination Contact    No longer active with [facility] case management effective 12/01/2020.  Reason for community disenrollment: Change Care System/PCC to [facility] with Dr. Noel.   Digna Chen RN BSN PHN      [facility]  Care Coordinator  Phone:   [phone]  Fax:       [phone]

## 2021-05-28 ENCOUNTER — RECORDS - HEALTHEAST (OUTPATIENT)
Dept: ADMINISTRATIVE | Facility: CLINIC | Age: 76
End: 2021-05-28

## 2022-08-18 NOTE — MR AVS SNAPSHOT
After Visit Summary   9/12/2018    Suze Devlin    MRN: [id number]           Patient Information     Date Of Birth          1945        Visit Information        Provider Department      9/12/2018 9:40 AM Mara Durán MD; [facility] [employer] [employer] [facility]        Today's Diagnoses     Constipation, unspecified constipation type    -  1    Mild persistent asthma with acute exacerbation        Type 2 diabetes mellitus with diabetic neuropathy, without long-term current use of insulin (H)        Hypertension goal BP (blood pressure) < 140/80        Hyperlipidemia LDL goal <100          Care Instructions      Restart Advair, as discussed.    Use Albuterol every 4-6 hours, as needed.    Follow-up with Dr. Hudson in 2 days to recheck.    Follow up sooner if:  fever, worsening symptoms, or frequent Albuterol use.    Follow up immediately if:  shortness of breath (not responsive to Albuterol), hemoptysis (coughing up blood), or other severe/emergent symptoms.      MiraLAX, as prescribed.    Follow up if worsening condition or not improving over the next week.    Follow up in the ER immediately if:  fever, uncontrolled vomiting, severe/worsening abdominal pain, severe/worsening back pain, or other emergent symptoms, as discussed at time of visit.      Follow-up with Dr. Hudson to discuss diabetes labs and obtain a fasting cholesterol, as discussed at time of visit.    Schedule eye exam, as discussed.          Follow-ups after your visit        Additional Services     OPHTHALMOLOGY ADULT REFERRAL       Your provider has referred you to:  [facility]: Eye Clinic [facility] [phone]   [email or web address]      Please be aware that coverage of these services is subject to the terms and limitations of your health insurance plan.  Call member services at your health plan with any benefit or coverage questions.   "    Please bring the following to your appointment:  >>   Any x-rays, CTs or MRIs which have been performed.  Contact the facility where they were done to arrange for  prior to your scheduled appointment.  Any new CT, MRI or other procedures ordered by your specialist must be performed at a [facility] facility or coordinated by your clinic's referral office.    >>   List of current medications   >>   This referral request   >>   Any documents/labs given to you for this referral                  Who to contact     If you have questions or need follow up information about today's clinic visit or your schedule please contact [facility] directly at [phone].  Normal or non-critical lab and imaging results will be communicated to you by MyChart, letter or phone within 4 business days after the clinic has received the results. If you do not hear from us within 7 days, please contact the clinic through [employer] or phone. If you have a critical or abnormal lab result, we will notify you by phone as soon as possible.  Submit refill requests through [employer] or call your pharmacy and they will forward the refill request to us. Please allow 3 business days for your refill to be completed.          Additional Information About Your Visit        [employer] Information     [employer] lets you send messages to your doctor, view your test results, renew your prescriptions, schedule appointments and more. To sign up, go to [email or web address] . Click on \"Log in\" on the left side of the screen, which will take you to the Welcome page. Then click on \"Sign up Now\" on the right side of the page.     You will be asked to enter the access code listed below, as well as some personal information. Please follow the directions to create your username and password.     Your access code is: [email or web address]  Expires: 2018  3:53 PM     Your access code will  in 90 days. If you need help or a new " code, please call your [place] clinic or [phone].        Care EveryWhere ID     This is your Care EveryWhere ID. This could be used by other organizations to access your [place] medical records  [id number]        Your Vitals Were     Pulse Temperature Respirations Pulse Oximetry BMI (Body Mass Index)       74 97.7  F (36.5  C) 16 100% 36.39 kg/m2        Blood Pressure from Last 3 Encounters:   09/12/18 120/58   06/01/18 118/62   03/16/18 116/60    Weight from Last 3 Encounters:   09/12/18 250 lb (113.4 kg)   06/01/18 245 lb 8 oz (111.4 kg)   03/16/18 250 lb (113.4 kg)              We Performed the Following     Albumin Random Urine Quantitative with Creat Ratio     CBC with platelets differential     Comprehensive metabolic panel     FOOT EXAM  NO CHARGE [[id number]]     HEMOGLOBIN A1C     OPHTHALMOLOGY ADULT REFERRAL     PAF COMPLETED          Today's Medication Changes          These changes are accurate as of 9/12/18  3:53 PM.  If you have any questions, ask your nurse or doctor.               Start taking these medicines.        Dose/Directions    fluticasone-salmeterol 100-50 MCG/DOSE diskus inhaler   Commonly known as:  ADVAIR   Used for:  Mild persistent asthma with acute exacerbation   Started by:  Mara Durán MD        Dose:  1 puff   Inhale 1 puff into the lungs 2 times daily   Quantity:  1 Inhaler   Refills:  5       polyethylene glycol powder   Commonly known as:  MIRALAX   Used for:  Constipation, unspecified constipation type   Started by:  Mara Durán MD        Dose:  1 capful   Take 17 g (1 capful) by mouth daily as needed for constipation   Quantity:  119 g   Refills:  0            Where to get your medicines      These medications were sent to [place], [facility] [address]  [address] [address]     Phone:  [phone]     albuterol 108 (90 Base) MCG/ACT inhaler     fluticasone-salmeterol 100-50 MCG/DOSE diskus inhaler    polyethylene glycol powder                Primary Care Provider Office Phone # Fax #    Zack Myriam Hudson [phone] [address] [facility] [address]        Equal Access to Services     CHRISTIANO FRANCIS : Hadii aad ku hadasho Soomaali, waaxda luqadaha, qaybta kaalmada adeegyada, waxay lorenin osbaldon adeyahir alvarengahelenlouis shaw. So [facility] [phone].    ATENCIÓN: Si habla español, tiene a wells disposición servicios gratuitos de asistencia lingüística. [facility] [phone].    We comply with applicable federal civil rights laws and [place] laws. We do not discriminate on the basis of race, color, national origin, age, disability, sex, sexual orientation, or gender identity.            Thank you!     Thank you for choosing [facility]  for your care. Our goal is always to provide you with excellent care. Hearing back from our patients is one way we can continue to improve our services. Please take a few minutes to complete the written survey that you may receive in the mail after your visit with us. Thank you!             Your Updated Medication List - Protect others around you: Learn how to safely use, store and throw away your medicines at www.disposemymeds.org.          This list is accurate as of 9/12/18  3:53 PM.  Always use your most recent med list.                   Brand Name Dispense Instructions for use Diagnosis    acetaminophen 500 MG Caps     120 capsule    Take 1 tablet by mouth 4 times daily    Primary osteoarthritis of both knees       albuterol 108 (90 Base) MCG/ACT inhaler    PROAIR HFA/PROVENTIL HFA/VENTOLIN HFA    1 Inhaler    Inhale 2 puffs into the lungs 4 times daily as needed    Mild persistent asthma with acute exacerbation       aspirin 81 MG EC tablet     90 tablet    Take 1 tablet (81 mg) by mouth daily    Hyperlipidemia LDL goal <100       atorvastatin 20 MG tablet     LIPITOR    90 tablet    Take 1 tablet (20 mg) by mouth daily    Hyperlipidemia LDL goal <100, Flatulence, eructation, and gas pain       B-12 1000 MCG Tbcr     90 tablet    Take 1,000 mcg by mouth daily    Vitamin B 12 deficiency       blood glucose monitoring lancets     3 Box    Use to test blood sugars  TWICE DAILY    or as directed.    Type 2 diabetes mellitus without complication (H)       * blood glucose monitoring meter device kit    no brand specified    1 kit    Use to test blood sugar two times daily or as directed.    Type 2 diabetes mellitus with diabetic nephropathy (H)       * blood glucose monitoring meter device kit     1 kit    Use to test blood sugars TWICE DAILY PRN  times daily or as directed.    Type 2 diabetes mellitus without complication, without long-term current use of insulin (H)       blood glucose monitoring test strip    no brand specified    100 each    Use to test blood sugars two times daily or as directed    Type 2 diabetes mellitus without complication, without long-term current use of insulin (H)       diclofenac 1 % Gel topical gel    VOLTAREN    100 g    Apply 4 grams to knees or 2 grams to hands four times daily using enclosed dosing card.    Primary osteoarthritis of left knee       finasteride 5 MG tablet    PROSCAR    90 tablet    Take 1 tablet (5 mg) by mouth daily    BPH associated with nocturia       fluticasone-salmeterol 100-50 MCG/DOSE diskus inhaler    ADVAIR    1 Inhaler    Inhale 1 puff into the lungs 2 times daily    Mild persistent asthma with acute exacerbation       glipiZIDE 10 MG 24 hr tablet    GLUCOTROL XL    30 tablet    Take 1 tablet (10 mg) by mouth daily    Type 2 diabetes mellitus with diabetic nephropathy, without long-term current use of insulin (H)       ipratropium - albuterol 0.5 mg/2.5 mg/3 mL 0.5-2.5 (3) MG/3ML neb solution    DUONEB    2.5 mL    Take 1 vial (3 mLs) by nebulization every 6 hours as needed for shortness of breath / dyspnea or  wheezing    Acute bronchitis due to other specified organisms, Wheezing, Mild intermittent asthma with acute exacerbation       lidocaine 5 % ointment    XYLOCAINE    300 g    One application 4 x daily to affected areas lower back and knees if necessary    Midline low back pain with sciatica, sciatica laterality unspecified       losartan 25 MG tablet    COZAAR    90 tablet    Take 1 tablet (25 mg) by mouth daily    Hypertension goal BP (blood pressure) < 140/80       metFORMIN modified 1000 MG 24 hr tablet    GLUMETZA    90 tablet    Take 1 tablet (1,000 mg) by mouth daily (with dinner)    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), Other diabetic neurological complication associated with diabetes mellitus due to underlying condition (H)       polyethylene glycol powder    MIRALAX    119 g    Take 17 g (1 capful) by mouth daily as needed for constipation    Constipation, unspecified constipation  type       senna-docusate 8.6-50 MG per tablet    SENOKOT-S;PERICOLACE    120 tablet    Take 2 tablets by mouth 2 times daily    Slow transit constipation       sitagliptin 50 MG tablet    JANUVIA    90 tablet    Take 1 tablet (50 mg) by mouth daily    Type 2 diabetes mellitus with diabetic nephropathy, without long-term current use of insulin (H)       tadalafil 5 MG tablet    CIALIS    90 tablet    Take 1 tablet (5 mg) by mouth daily Never use with nitroglycerin, terazosin or doxazosin.    Sexual dysfunction       tamsulosin 0.4 MG capsule    FLOMAX    90 capsule    Take 1 capsule (0.4 mg) by mouth daily    Nocturia       triamcinolone 0.1 % cream    KENALOG    80 g    Apply sparingly to affected area three times daily as needed    Neurodermatitis, localized       vitamin D 2000 units tablet     100 tablet    Take 2,000 Units by mouth daily    Vitamin D insufficiency       * Notice:  This list has 2 medication(s) that are the same as other medications prescribed for you. Read the directions carefully, and ask your doctor or other care provider to review them with you.       [facility] Pregnancy And Lactation Text: There is insufficient data to evaluate drug-associated risk for major birth defects, miscarriage, or other adverse maternal or fetal outcomes.  There is a pregnancy registry that monitors pregnancy outcomes in pregnant persons exposed to the medication during pregnancy.  It is unknown if this medication is excreted in breast milk.  Do not breastfeed during treatment and for about 4 weeks after the last dose.

## 2023-08-30 NOTE — PROGRESS NOTES
Courtesy from fat disorders.org     See edema therapist referral done    Take lymphatic formula one pill a day     Consider seeing weight loss clinic  and sleep evaluation   
Updated member that below per emal received form [place].  [facility] [phone] PAWAN CHAN [place] [phone] [id number] Jordan BARNETT 6/2/2018 8/1/2018     Offered to assist in helping member get a new provider.  Member aware and will contact CM if he need assistance finding another provider.  Digna Chen RN N  [facility]   Phone:   [phone]  Fax:       [phone]    
No

## 2024-07-14 NOTE — TELEPHONE ENCOUNTER
Vitamin d2 1.25 mg (50,000 unit)      Last Written Prescription Date:  -  Last Fill Quantity: -,   # refills: -  Last Office Visit with G, P or [facility] prescribing provider: 10/31/16  Future Office visit:       Routing refill request to provider for review/approval because:  Drug not active on patient's medication list   Home